# Patient Record
Sex: MALE | Race: WHITE | NOT HISPANIC OR LATINO | Employment: OTHER | URBAN - METROPOLITAN AREA
[De-identification: names, ages, dates, MRNs, and addresses within clinical notes are randomized per-mention and may not be internally consistent; named-entity substitution may affect disease eponyms.]

---

## 2023-07-07 ENCOUNTER — TELEPHONE (OUTPATIENT)
Dept: HEMATOLOGY/ONCOLOGY | Facility: CLINIC | Age: 78
End: 2023-07-07
Payer: COMMERCIAL

## 2023-07-07 ENCOUNTER — PATIENT MESSAGE (OUTPATIENT)
Dept: HEMATOLOGY/ONCOLOGY | Facility: CLINIC | Age: 78
End: 2023-07-07
Payer: MEDICARE

## 2023-07-07 NOTE — TELEPHONE ENCOUNTER
Attempted to call patient in reference to needing additional information in reference to upcoming Hematology appt.  No answer v/m full. [facility] message sent.

## 2023-07-11 ENCOUNTER — TELEPHONE (OUTPATIENT)
Dept: HEMATOLOGY/ONCOLOGY | Facility: CLINIC | Age: 78
End: 2023-07-11
Payer: MEDICARE

## 2023-07-11 NOTE — TELEPHONE ENCOUNTER
Called patient to discuss scheduled appt with Dr. Gibbs. Patient explained that he is primarily interested in discussing BCG treatment. He has discussed this with the [facility] group, and they advised him to try [facility] since we are partnered with [facility] at this time. Patient and I discussed that this particular treatment is only available in [place]. He is fine traveling there to meet with one of our urologists who can facilitate it. Scheduled with Dr. Luong for Monday, reviewed the directions to the [facility]. Patient verbalized understanding to all information, he has my number for any questions/concerns.

## 2023-07-17 ENCOUNTER — OFFICE VISIT (OUTPATIENT)
Dept: UROLOGY | Facility: CLINIC | Age: 78
End: 2023-07-17
Payer: MEDICARE

## 2023-07-17 VITALS
WEIGHT: 193.31 LBS | SYSTOLIC BLOOD PRESSURE: 120 MMHG | HEIGHT: 72 IN | DIASTOLIC BLOOD PRESSURE: 67 MMHG | HEART RATE: 79 BPM | BODY MASS INDEX: 26.18 KG/M2

## 2023-07-17 DIAGNOSIS — C67.9 MALIGNANT NEOPLASM OF URINARY BLADDER, UNSPECIFIED SITE: Primary | ICD-10-CM

## 2023-07-17 PROCEDURE — 99999 PR PBB SHADOW E&M-EST. PATIENT-LVL III: CPT | Mod: PBBFAC,,, | Performed by: STUDENT IN AN ORGANIZED HEALTH CARE EDUCATION/TRAINING PROGRAM

## 2023-07-17 PROCEDURE — 99204 OFFICE O/P NEW MOD 45 MIN: CPT | Mod: S$PBB,,, | Performed by: STUDENT IN AN ORGANIZED HEALTH CARE EDUCATION/TRAINING PROGRAM

## 2023-07-17 PROCEDURE — 99999 PR PBB SHADOW E&M-EST. PATIENT-LVL III: ICD-10-PCS | Mod: PBBFAC,,, | Performed by: STUDENT IN AN ORGANIZED HEALTH CARE EDUCATION/TRAINING PROGRAM

## 2023-07-17 PROCEDURE — 99213 OFFICE O/P EST LOW 20 MIN: CPT | Mod: PBBFAC | Performed by: STUDENT IN AN ORGANIZED HEALTH CARE EDUCATION/TRAINING PROGRAM

## 2023-07-17 PROCEDURE — 99204 PR OFFICE/OUTPT VISIT, NEW, LEVL IV, 45-59 MIN: ICD-10-PCS | Mod: S$PBB,,, | Performed by: STUDENT IN AN ORGANIZED HEALTH CARE EDUCATION/TRAINING PROGRAM

## 2023-07-17 RX ORDER — VALSARTAN 40 MG/1
40 TABLET ORAL
COMMUNITY
Start: 2023-05-02

## 2023-07-17 RX ORDER — TAMSULOSIN HYDROCHLORIDE 0.4 MG/1
0.4 CAPSULE ORAL
COMMUNITY
Start: 2022-10-27

## 2023-07-17 RX ORDER — EVOLOCUMAB 140 MG/ML
140 INJECTION, SOLUTION SUBCUTANEOUS
COMMUNITY
Start: 2023-06-15

## 2023-07-17 RX ORDER — ROSUVASTATIN CALCIUM 40 MG/1
40 TABLET, COATED ORAL NIGHTLY
COMMUNITY
Start: 2023-05-02

## 2023-07-17 RX ORDER — VIT C/E/ZN/COPPR/LUTEIN/ZEAXAN 250MG-90MG
5000 CAPSULE ORAL
COMMUNITY

## 2023-07-17 RX ORDER — EZETIMIBE 10 MG/1
10 TABLET ORAL
COMMUNITY
Start: 2023-05-02

## 2023-07-17 RX ORDER — IMIQUIMOD 12.5 MG/.25G
1 CREAM TOPICAL
COMMUNITY
Start: 2023-05-01

## 2023-07-17 RX ORDER — METRONIDAZOLE 7.5 MG/G
1 GEL TOPICAL
COMMUNITY
Start: 2022-07-25

## 2023-07-17 RX ORDER — ESZOPICLONE 3 MG/1
3 TABLET, FILM COATED ORAL
COMMUNITY
Start: 2023-05-18

## 2023-07-17 RX ORDER — METOPROLOL TARTRATE 50 MG/1
TABLET ORAL
COMMUNITY
Start: 2023-06-15 | End: 2024-06-14

## 2023-07-17 NOTE — PROGRESS NOTES
[facility]  Urologic Oncology Clinic Note        Date of Service: 7/17/2023      Chief Complaint/Reason for Consultation: NMIBC    Requesting Provider:   Alisson Self  No address on file      History of Present Illness:   Patient 77 y.o. male presents with new diagnosis of NMIBC. He was originally found to have tumor in his bladder after undergoing workup for microscopic hematuria. He had original TURBT in [place] and then went on to have repeat resection at [facility].     Urologic History:     6/9/2023 -- TURBT @ [place] -- HG Ta  6/29/2023 -- CT Urogram -- no nodes, no renal masses, no ureteral filling defects  6/30/2023 -- TURBT @ [facility] -- no tumor         Review of patient's allergies indicates:  No Known Allergies     Past Medical History:   Diagnosis Date    Kidney stone       Past Surgical History:   Procedure Laterality Date    BLADDER SURGERY      HERNIA REPAIR        History reviewed. No pertinent family history.   Social History     Tobacco Use    Smoking status: Never    Smokeless tobacco: Never   Substance Use Topics    Alcohol use: Yes        Review of Systems   Constitutional:  Negative for activity change, fatigue and fever.   HENT:  Negative for congestion.    Respiratory:  Negative for shortness of breath.    Cardiovascular:  Negative for chest pain.   Gastrointestinal:  Negative for abdominal pain.   Genitourinary:  Negative for hematuria.           OBJECTIVE:     Vitals:    07/17/23 1346   BP: 120/67   Pulse: 79   Weight: 87.7 kg (193 lb 5.5 oz)   Height: 6' (1.829 m)          General Appearance: Alert, cooperative, no distress  Head: Normocephalic  Eyes: Clear conjunctiva  Neck: No obvious LND or JVD  Lungs: Normal chest excursion, no accessory muscle use  Chest: Regular rate rhythm by palpation, no pedal edema  Abdomen: Soft, non-tender, non-distended  Extremities: Atraumatic   Lymph Nodes: No appreciable lymph adenopathy  Neurologic: No gross gait, motor or sensory  deficits            LAB:      CMP  Sodium   Date Value Ref Range Status   06/09/2023 142 136 - 145 mmol/L Final     Potassium   Date Value Ref Range Status   06/09/2023 3.7 3.5 - 5.1 mmol/L Final     Chloride   Date Value Ref Range Status   06/09/2023 106 100 - 109 mmol/L Final     Carbon Dioxide   Date Value Ref Range Status   06/09/2023 25 22 - 33 mmol/L Final     Blood Urea Nitrogen   Date Value Ref Range Status   06/09/2023 15 5 - 25 mg/dL Final     Creatinine   Date Value Ref Range Status   06/29/2023 0.96 0.67 - 1.17 mg/dL Final     Calcium   Date Value Ref Range Status   06/09/2023 9.3 8.8 - 10.6 mg/dL Final     Anion Gap   Date Value Ref Range Status   06/09/2023 11 8 - 16 mmol/L Final     Lab Results   Component Value Date    WBC 8.2 02/09/2023    HGB 15.3 02/09/2023    HCT 44.9 02/09/2023     02/09/2023             IMAGING:        Outside CT Urogram reviewed with patient      ASSESSMENT/PLAN:     78 yo M w new diagnosis of NMIBC , high grade Ta    Plan:    Today we discussed the incidence, risk factors, and natural history of bladder cancer, including in general the management options such as TURBT and radical cystectomy, and the use of intravesical therapies to avoid recurrence of cancer and progression.     Specifically, we spoke about non-muscle invasive bladder cancer and that the natural history of this cancer is to recur when low grade and progress when high grade. We also discussed that higher stages portend higher risk of recurrence and progression.     In the case of HG Ta  bladder cancer, we discussed the importance of high-quality TURBT as well as repeat TURBT to ensure all disease is resected and to ensure that staging is appropriate. He has completed this.     We informed him that he falls into the AUA intermediate risk category and should undergo some intravesical therapy to prevent recurrence and progression.     Briefly, I described that intravesical therapy is given once a week for  one hour for 6 weeks and if no bladder tumor recurrence at first surveillance then he could be a candidate for 1 or 3 years of maintenance therapy depending his risk category.    Patient understood all counseling well. He is in agreement with the plan. He  was allowed to ask questions and all were     Plan for induction gemcitabine and then follow up cystoscopy at which point we will decide on maintenance therapy.         The total time for the new patient visit was at least 45 minutes in both face-to-face and non-face-to-face activities, which included chart review, interpretation of results, counseling, education, ordering meds/tests/procedures, and/or coordination of care.       Junior Luong MD  Urologic Oncology  P: [phone]

## 2023-07-18 DIAGNOSIS — C67.8 MALIGNANT NEOPLASM OF OVERLAPPING SITES OF BLADDER: ICD-10-CM

## 2023-07-18 RX ORDER — SODIUM BICARBONATE 650 MG/1
1300 TABLET ORAL
Qty: 24 TABLET | Refills: 1 | Status: SHIPPED | OUTPATIENT
Start: 2023-08-16 | End: 2023-08-03 | Stop reason: SDUPTHER

## 2023-08-03 ENCOUNTER — PATIENT MESSAGE (OUTPATIENT)
Dept: UROLOGY | Facility: CLINIC | Age: 78
End: 2023-08-03
Payer: MEDICARE

## 2023-08-03 DIAGNOSIS — C67.8 MALIGNANT NEOPLASM OF OVERLAPPING SITES OF BLADDER: ICD-10-CM

## 2023-08-03 RX ORDER — SODIUM BICARBONATE 650 MG/1
1300 TABLET ORAL
Qty: 24 TABLET | Refills: 1 | Status: SHIPPED | OUTPATIENT
Start: 2023-08-16 | End: 2023-12-12 | Stop reason: SDUPTHER

## 2023-08-08 ENCOUNTER — OFFICE VISIT (OUTPATIENT)
Dept: UROLOGY | Facility: CLINIC | Age: 78
End: 2023-08-08
Payer: COMMERCIAL

## 2023-08-08 VITALS
WEIGHT: 193 LBS | BODY MASS INDEX: 26.14 KG/M2 | HEART RATE: 75 BPM | SYSTOLIC BLOOD PRESSURE: 151 MMHG | HEIGHT: 72 IN | DIASTOLIC BLOOD PRESSURE: 94 MMHG

## 2023-08-08 DIAGNOSIS — C67.8 MALIGNANT NEOPLASM OF OVERLAPPING SITES OF BLADDER: Primary | ICD-10-CM

## 2023-08-08 LAB
BILIRUB SERPL-MCNC: ABNORMAL MG/DL
BLOOD URINE, POC: ABNORMAL
CLARITY, POC UA: CLEAR
COLOR, POC UA: YELLOW
GLUCOSE UR QL STRIP: ABNORMAL
KETONES UR QL STRIP: ABNORMAL
LEUKOCYTE ESTERASE URINE, POC: ABNORMAL
NITRITE, POC UA: ABNORMAL
PH, POC UA: 6
PROTEIN, POC: ABNORMAL
SPECIFIC GRAVITY, POC UA: 1.01
UROBILINOGEN, POC UA: NORMAL

## 2023-08-08 PROCEDURE — 99499 UNLISTED E&M SERVICE: CPT | Mod: S$GLB,,, | Performed by: NURSE PRACTITIONER

## 2023-08-08 PROCEDURE — 1126F AMNT PAIN NOTED NONE PRSNT: CPT | Mod: CPTII,S$GLB,, | Performed by: NURSE PRACTITIONER

## 2023-08-08 PROCEDURE — 3077F PR MOST RECENT SYSTOLIC BLOOD PRESSURE >= 140 MM HG: ICD-10-PCS | Mod: CPTII,S$GLB,, | Performed by: NURSE PRACTITIONER

## 2023-08-08 PROCEDURE — 81002 POCT URINE DIPSTICK WITHOUT MICROSCOPE: ICD-10-PCS | Mod: S$GLB,,, | Performed by: NURSE PRACTITIONER

## 2023-08-08 PROCEDURE — 3288F FALL RISK ASSESSMENT DOCD: CPT | Mod: CPTII,S$GLB,, | Performed by: NURSE PRACTITIONER

## 2023-08-08 PROCEDURE — 1101F PT FALLS ASSESS-DOCD LE1/YR: CPT | Mod: CPTII,S$GLB,, | Performed by: NURSE PRACTITIONER

## 2023-08-08 PROCEDURE — 51720 PR INSTILL, ANTICANCER AGENT, BLADDER: ICD-10-PCS | Mod: S$GLB,,, | Performed by: NURSE PRACTITIONER

## 2023-08-08 PROCEDURE — 1159F MED LIST DOCD IN RCRD: CPT | Mod: CPTII,S$GLB,, | Performed by: NURSE PRACTITIONER

## 2023-08-08 PROCEDURE — 1159F PR MEDICATION LIST DOCUMENTED IN MEDICAL RECORD: ICD-10-PCS | Mod: CPTII,S$GLB,, | Performed by: NURSE PRACTITIONER

## 2023-08-08 PROCEDURE — 1126F PR PAIN SEVERITY QUANTIFIED, NO PAIN PRESENT: ICD-10-PCS | Mod: CPTII,S$GLB,, | Performed by: NURSE PRACTITIONER

## 2023-08-08 PROCEDURE — 99999 PR PBB SHADOW E&M-EST. PATIENT-LVL IV: ICD-10-PCS | Mod: PBBFAC,,, | Performed by: NURSE PRACTITIONER

## 2023-08-08 PROCEDURE — 1101F PR PT FALLS ASSESS DOC 0-1 FALLS W/OUT INJ PAST YR: ICD-10-PCS | Mod: CPTII,S$GLB,, | Performed by: NURSE PRACTITIONER

## 2023-08-08 PROCEDURE — 51720 TREATMENT OF BLADDER LESION: CPT | Mod: S$GLB,,, | Performed by: NURSE PRACTITIONER

## 2023-08-08 PROCEDURE — 3080F DIAST BP >= 90 MM HG: CPT | Mod: CPTII,S$GLB,, | Performed by: NURSE PRACTITIONER

## 2023-08-08 PROCEDURE — 99999 PR PBB SHADOW E&M-EST. PATIENT-LVL IV: CPT | Mod: PBBFAC,,, | Performed by: NURSE PRACTITIONER

## 2023-08-08 PROCEDURE — 3080F PR MOST RECENT DIASTOLIC BLOOD PRESSURE >= 90 MM HG: ICD-10-PCS | Mod: CPTII,S$GLB,, | Performed by: NURSE PRACTITIONER

## 2023-08-08 PROCEDURE — 3288F PR FALLS RISK ASSESSMENT DOCUMENTED: ICD-10-PCS | Mod: CPTII,S$GLB,, | Performed by: NURSE PRACTITIONER

## 2023-08-08 PROCEDURE — 3077F SYST BP >= 140 MM HG: CPT | Mod: CPTII,S$GLB,, | Performed by: NURSE PRACTITIONER

## 2023-08-08 PROCEDURE — 81002 URINALYSIS NONAUTO W/O SCOPE: CPT | Mod: S$GLB,,, | Performed by: NURSE PRACTITIONER

## 2023-08-08 PROCEDURE — 99499 NO LOS: ICD-10-PCS | Mod: S$GLB,,, | Performed by: NURSE PRACTITIONER

## 2023-08-08 NOTE — PROGRESS NOTES
CHIEF COMPLAINT:    Monroe Paul is a 78 y.o. male presents today for Bladder Cancer.     HISTORY OF PRESENTING ILLINESS:    Monroe Paul is a 78 y.o. male who is an established patient in our clinic.   He is newly diagnosed with NMI Bladder Cancer.   He had original TURBT in [place] and then went on to have repeat resection at [facility].   Last seen in clinic 07/17/2023 with Dr. Luong    Here today to begin Induction Gemcitabine dose 1 of 6.   Reports no issue with urination.        Urologic History:      6/9/2023 -- TURBT @ [place] -- HG Ta  6/29/2023 -- CT Urogram -- no nodes, no renal masses, no ureteral filling defects  6/30/2023 -- TURBT @ [facility] -- no tumor              REVIEW OF SYSTEMS:  Review of Systems   Constitutional: Negative.  Negative for chills and fever.   Eyes:  Negative for double vision.   Respiratory:  Negative for cough and shortness of breath.    Cardiovascular:  Negative for chest pain.   Gastrointestinal:  Negative for abdominal pain, constipation, diarrhea, nausea and vomiting.   Genitourinary: Negative.  Negative for dysuria, flank pain and hematuria.        Ok with urination  Nocturia x 1  No issues with bladder control     Neurological:  Negative for dizziness and seizures.   Endo/Heme/Allergies:  Negative for polydipsia.         PATIENT HISTORY:    Past Medical History:   Diagnosis Date    Kidney stone        Past Surgical History:   Procedure Laterality Date    BLADDER SURGERY      HERNIA REPAIR         History reviewed. No pertinent family history.    Social History     Socioeconomic History    Marital status:    Tobacco Use    Smoking status: Never    Smokeless tobacco: Never   Substance and Sexual Activity    Alcohol use: Yes    Drug use: Never    Sexual activity: Not Currently       Allergies:  Patient has no known allergies.    Medications:    Current Outpatient Medications:     cholecalciferol, vitamin D3, (VITAMIN D3) 25 mcg (1,000 unit) capsule, Take  5,000 Units by mouth., Disp: , Rfl:     denosumab (PROLIA) 60 mg/mL Syrg, Inject 60 mg into the skin., Disp: , Rfl:     eszopiclone (LUNESTA) 3 mg Tab, Take 3 mg by mouth., Disp: , Rfl:     evolocumab (REPATHA SURECLICK) 140 mg/mL PnIj, Inject 140 mg into the skin., Disp: , Rfl:     ezetimibe (ZETIA) 10 mg tablet, Take 10 mg by mouth., Disp: , Rfl:     imiquimod (ALDARA) 5 % cream, Apply 1 packet topically., Disp: , Rfl:     metoprolol tartrate (LOPRESSOR) 50 MG tablet, Take as directed the night before and 1 hour prior to CT., Disp: , Rfl:     metroNIDAZOLE (METROGEL) 0.75 % gel, Apply 1 application  topically., Disp: , Rfl:     rosuvastatin (CRESTOR) 40 MG Tab, Take 40 mg by mouth every evening., Disp: , Rfl:     [START ON 8/16/2023] sodium bicarbonate 650 MG tablet, Take 2 tablets (1,300 mg total) by mouth as needed (start before bladder installation to reduce irritation). 2 tabs night before bladder installation then 2 tabs the morning of the bladder installation, Disp: 24 tablet, Rfl: 1    tamsulosin (FLOMAX) 0.4 mg Cap, Take 0.4 mg by mouth., Disp: , Rfl:     valsartan (DIOVAN) 40 MG tablet, Take 40 mg by mouth., Disp: , Rfl:     Current Facility-Administered Medications:     gemcitabine (GEMZAR) 2,000 mg in sodium chloride 0.9% SolP 100 mL bladder instillation, 2,000 mg, Intravesical, 1 time in Clinic/HOD, Connie Steiner NP, 2,000 mg at 08/08/23 0942    PHYSICAL EXAMINATION:  Physical Exam  Vitals and nursing note reviewed.   Constitutional:       General: He is awake.      Appearance: Normal appearance.   HENT:      Head: Normocephalic.      Right Ear: External ear normal.      Left Ear: External ear normal.      Nose: Nose normal.   Cardiovascular:      Rate and Rhythm: Normal rate.   Pulmonary:      Effort: Pulmonary effort is normal. No respiratory distress.   Abdominal:      Tenderness: There is no abdominal tenderness. There is no right CVA tenderness or left CVA tenderness.   Genitourinary:      "Penis: Normal.       Testes: Normal.   Musculoskeletal:         General: Normal range of motion.      Cervical back: Normal range of motion.   Skin:     General: Skin is warm and dry.   Neurological:      General: No focal deficit present.      Mental Status: He is alert and oriented to person, place, and time.   Psychiatric:         Mood and Affect: Mood normal.         Behavior: Behavior is cooperative.           LABS:      In office UA today was clear of active infection and blood.       No results found for: "PSA", "PSADIAG", "PSATOTAL", "PHIND"    Lab Results   Component Value Date    CREATININE 0.96 06/29/2023             IMPRESSION:    Encounter Diagnoses   Name Primary?    Malignant neoplasm of overlapping sites of bladder Yes       Induction GEMCITABINE; dose 1 of 6      Assessment:       1. Malignant neoplasm of overlapping sites of bladder        Plan:         I spent 60 minutes with the patient of which more than half was spent in direct consultation with the patient in regards to our treatment and plan.  We addressed the expectations for today's plan of care.  Reviewed the preparation:   Na Bicarb 1300mg night before then this morning.   No coffee or caffeine this morning;    We discussed their Bladder Cancer.  Discussed previous treatments and the expectations, benefits, risks with this new treatment.  Reviewed how this medication works. Possible side effects.   Discussed importance of post clean up for 6 hours after the 2 hour urination;    Diet modifications; no caffeine the morning of installation; increase water intake at the 2 hour void to flush out.  Sit to urinate; flush twice; no bleach needed.  Clean urethra after each initial urination.   No sex for 48 hours after installation; use of condom during the 6 week installations.  Graves was placed and bladder drained.   I instilled the Gemcitabine 2000mg/100ml then removed catheter.   Tolerated well   He was instructed to hold this up to 120 " minutes.   I cleaned urethra and sounding tissue.  Again reviewed post installation instructions.  Recommended lifestyle modifications with proper, healthy diet, good hydration if no fluid restrictions; reducing bladder irritants.   RTC one week for dose 2 of 6.

## 2023-08-15 ENCOUNTER — OFFICE VISIT (OUTPATIENT)
Dept: UROLOGY | Facility: CLINIC | Age: 78
End: 2023-08-15
Payer: COMMERCIAL

## 2023-08-15 VITALS
HEIGHT: 72 IN | HEART RATE: 69 BPM | BODY MASS INDEX: 26.14 KG/M2 | WEIGHT: 193 LBS | DIASTOLIC BLOOD PRESSURE: 88 MMHG | SYSTOLIC BLOOD PRESSURE: 147 MMHG

## 2023-08-15 DIAGNOSIS — C67.8 MALIGNANT NEOPLASM OF OVERLAPPING SITES OF BLADDER: Primary | ICD-10-CM

## 2023-08-15 DIAGNOSIS — C67.9 MALIGNANT NEOPLASM OF URINARY BLADDER, UNSPECIFIED SITE: ICD-10-CM

## 2023-08-15 LAB
BILIRUB SERPL-MCNC: ABNORMAL MG/DL
BLOOD URINE, POC: ABNORMAL
CLARITY, POC UA: CLEAR
COLOR, POC UA: YELLOW
GLUCOSE UR QL STRIP: ABNORMAL
KETONES UR QL STRIP: ABNORMAL
LEUKOCYTE ESTERASE URINE, POC: ABNORMAL
NITRITE, POC UA: ABNORMAL
PH, POC UA: 5
PROTEIN, POC: ABNORMAL
SPECIFIC GRAVITY, POC UA: 1.02
UROBILINOGEN, POC UA: NORMAL

## 2023-08-15 PROCEDURE — 3288F FALL RISK ASSESSMENT DOCD: CPT | Mod: CPTII,S$GLB,, | Performed by: NURSE PRACTITIONER

## 2023-08-15 PROCEDURE — 1101F PT FALLS ASSESS-DOCD LE1/YR: CPT | Mod: CPTII,S$GLB,, | Performed by: NURSE PRACTITIONER

## 2023-08-15 PROCEDURE — 1101F PR PT FALLS ASSESS DOC 0-1 FALLS W/OUT INJ PAST YR: ICD-10-PCS | Mod: CPTII,S$GLB,, | Performed by: NURSE PRACTITIONER

## 2023-08-15 PROCEDURE — 51720 TREATMENT OF BLADDER LESION: CPT | Mod: S$GLB,,, | Performed by: NURSE PRACTITIONER

## 2023-08-15 PROCEDURE — 99999 PR PBB SHADOW E&M-EST. PATIENT-LVL IV: ICD-10-PCS | Mod: PBBFAC,,, | Performed by: NURSE PRACTITIONER

## 2023-08-15 PROCEDURE — 1159F MED LIST DOCD IN RCRD: CPT | Mod: CPTII,S$GLB,, | Performed by: NURSE PRACTITIONER

## 2023-08-15 PROCEDURE — 3079F PR MOST RECENT DIASTOLIC BLOOD PRESSURE 80-89 MM HG: ICD-10-PCS | Mod: CPTII,S$GLB,, | Performed by: NURSE PRACTITIONER

## 2023-08-15 PROCEDURE — 81002 POCT URINE DIPSTICK WITHOUT MICROSCOPE: ICD-10-PCS | Mod: S$GLB,,, | Performed by: NURSE PRACTITIONER

## 2023-08-15 PROCEDURE — 99499 NO LOS: ICD-10-PCS | Mod: S$GLB,,, | Performed by: NURSE PRACTITIONER

## 2023-08-15 PROCEDURE — 1126F AMNT PAIN NOTED NONE PRSNT: CPT | Mod: CPTII,S$GLB,, | Performed by: NURSE PRACTITIONER

## 2023-08-15 PROCEDURE — 3079F DIAST BP 80-89 MM HG: CPT | Mod: CPTII,S$GLB,, | Performed by: NURSE PRACTITIONER

## 2023-08-15 PROCEDURE — 3077F SYST BP >= 140 MM HG: CPT | Mod: CPTII,S$GLB,, | Performed by: NURSE PRACTITIONER

## 2023-08-15 PROCEDURE — 3077F PR MOST RECENT SYSTOLIC BLOOD PRESSURE >= 140 MM HG: ICD-10-PCS | Mod: CPTII,S$GLB,, | Performed by: NURSE PRACTITIONER

## 2023-08-15 PROCEDURE — 81002 URINALYSIS NONAUTO W/O SCOPE: CPT | Mod: S$GLB,,, | Performed by: NURSE PRACTITIONER

## 2023-08-15 PROCEDURE — 3288F PR FALLS RISK ASSESSMENT DOCUMENTED: ICD-10-PCS | Mod: CPTII,S$GLB,, | Performed by: NURSE PRACTITIONER

## 2023-08-15 PROCEDURE — 51720 PR INSTILL, ANTICANCER AGENT, BLADDER: ICD-10-PCS | Mod: S$GLB,,, | Performed by: NURSE PRACTITIONER

## 2023-08-15 PROCEDURE — 1159F PR MEDICATION LIST DOCUMENTED IN MEDICAL RECORD: ICD-10-PCS | Mod: CPTII,S$GLB,, | Performed by: NURSE PRACTITIONER

## 2023-08-15 PROCEDURE — 99499 UNLISTED E&M SERVICE: CPT | Mod: S$GLB,,, | Performed by: NURSE PRACTITIONER

## 2023-08-15 PROCEDURE — 1126F PR PAIN SEVERITY QUANTIFIED, NO PAIN PRESENT: ICD-10-PCS | Mod: CPTII,S$GLB,, | Performed by: NURSE PRACTITIONER

## 2023-08-15 PROCEDURE — 99999 PR PBB SHADOW E&M-EST. PATIENT-LVL IV: CPT | Mod: PBBFAC,,, | Performed by: NURSE PRACTITIONER

## 2023-08-15 RX ORDER — POTASSIUM CHLORIDE 750 MG/1
10 TABLET, EXTENDED RELEASE ORAL
COMMUNITY
Start: 2022-10-06

## 2023-08-15 RX ORDER — LINACLOTIDE 290 UG/1
290 CAPSULE, GELATIN COATED ORAL
COMMUNITY
Start: 2023-07-11

## 2023-08-15 RX ORDER — HYOSCYAMINE SULFATE 0.12 MG/1
TABLET, ORALLY DISINTEGRATING ORAL
COMMUNITY
Start: 2023-06-29

## 2023-08-15 RX ORDER — SULFAMETHOXAZOLE AND TRIMETHOPRIM 800; 160 MG/1; MG/1
1 TABLET ORAL 2 TIMES DAILY
COMMUNITY
Start: 2023-06-29

## 2023-08-15 RX ORDER — HYOSCYAMINE SULFATE 0.12 MG/1
0.12 TABLET, ORALLY DISINTEGRATING ORAL
COMMUNITY
Start: 2023-06-29

## 2023-08-15 RX ORDER — HYDROCHLOROTHIAZIDE 50 MG/1
50 TABLET ORAL
COMMUNITY
Start: 2022-08-15

## 2023-08-15 RX ORDER — LINACLOTIDE 72 UG/1
72 CAPSULE, GELATIN COATED ORAL EVERY MORNING
COMMUNITY
Start: 2023-03-13

## 2023-08-15 NOTE — PROGRESS NOTES
CHIEF COMPLAINT:    Monroe Paul is a 78 y.o. male presents today for Bladder Cancer.     HISTORY OF PRESENTING ILLINESS:    Monroe Paul is a 78 y.o. male who is an established patient in our clinic.   He is newly diagnosed with NMI Bladder Cancer.   He had original TURBT in [place] and then went on to have repeat resection at [facility].   Last seen in clinic 07/17/2023 with Dr. Luong     08/08/2023 received dose 1 of 6;  30 minutes after installation urinated some; then able to hold it.  No dysuria/hematuria.    Here today to begin Induction Gemcitabine dose 2 of 6.   Reports no issue with urination.         Urologic History:      6/9/2023 -- TURBT @ [place] -- HG Ta  6/29/2023 -- CT Urogram -- no nodes, no renal masses, no ureteral filling defects  6/30/2023 -- TURBT @ [facility] -- no tumor              REVIEW OF SYSTEMS:  Review of Systems   Constitutional: Negative.  Negative for chills and fever.   Eyes:  Negative for double vision.   Respiratory:  Negative for cough and shortness of breath.    Cardiovascular:  Negative for chest pain.   Gastrointestinal:  Negative for abdominal pain, constipation, diarrhea, nausea and vomiting.   Genitourinary: Negative.  Negative for dysuria, flank pain and hematuria.        Nocturia x 1     Neurological:  Negative for dizziness and seizures.   Endo/Heme/Allergies:  Negative for polydipsia.         PATIENT HISTORY:    Past Medical History:   Diagnosis Date    Kidney stone        Past Surgical History:   Procedure Laterality Date    BLADDER SURGERY      HERNIA REPAIR         History reviewed. No pertinent family history.    Social History     Socioeconomic History    Marital status:    Tobacco Use    Smoking status: Never    Smokeless tobacco: Never   Substance and Sexual Activity    Alcohol use: Yes    Drug use: Never    Sexual activity: Not Currently       Allergies:  Patient has no known allergies.    Medications:    Current Outpatient Medications:      hydroCHLOROthiazide (HYDRODIURIL) 50 MG tablet, Take 50 mg by mouth., Disp: , Rfl:     hyoscyamine (LEVSIN) 0.125 mg TbDL, Take 0.125 mg by mouth., Disp: , Rfl:     linaCLOtide (LINZESS) 290 mcg Cap capsule, Take 290 mcg by mouth., Disp: , Rfl:     potassium chloride (KLOR-CON) 10 MEQ TbSR, Take 10 mEq by mouth., Disp: , Rfl:     cholecalciferol, vitamin D3, (VITAMIN D3) 25 mcg (1,000 unit) capsule, Take 5,000 Units by mouth., Disp: , Rfl:     denosumab (PROLIA) 60 mg/mL Syrg, Inject 60 mg into the skin., Disp: , Rfl:     eszopiclone (LUNESTA) 3 mg Tab, Take 3 mg by mouth., Disp: , Rfl:     evolocumab (REPATHA SURECLICK) 140 mg/mL PnIj, Inject 140 mg into the skin., Disp: , Rfl:     ezetimibe (ZETIA) 10 mg tablet, Take 10 mg by mouth., Disp: , Rfl:     hyoscyamine (LEVSIN) 0.125 mg TbDL, Take by mouth., Disp: , Rfl:     imiquimod (ALDARA) 5 % cream, Apply 1 packet topically., Disp: , Rfl:     LINZESS 290 mcg Cap capsule, Take 290 mcg by mouth., Disp: , Rfl:     LINZESS 72 mcg Cap capsule, Take 72 mcg by mouth every morning., Disp: , Rfl:     metoprolol tartrate (LOPRESSOR) 50 MG tablet, Take as directed the night before and 1 hour prior to CT., Disp: , Rfl:     metroNIDAZOLE (METROGEL) 0.75 % gel, Apply 1 application  topically., Disp: , Rfl:     rosuvastatin (CRESTOR) 40 MG Tab, Take 40 mg by mouth every evening., Disp: , Rfl:     [START ON 8/16/2023] sodium bicarbonate 650 MG tablet, Take 2 tablets (1,300 mg total) by mouth as needed (start before bladder installation to reduce irritation). 2 tabs night before bladder installation then 2 tabs the morning of the bladder installation, Disp: 24 tablet, Rfl: 1    sulfamethoxazole-trimethoprim 800-160mg (BACTRIM DS) 800-160 mg Tab, Take 1 tablet by mouth 2 (two) times daily., Disp: , Rfl:     tamsulosin (FLOMAX) 0.4 mg Cap, Take 0.4 mg by mouth., Disp: , Rfl:     TURMERIC ORAL, Take 1 capsule by mouth once daily., Disp: , Rfl:     valsartan (DIOVAN) 40 MG tablet, Take  "40 mg by mouth., Disp: , Rfl:     ZINC ORAL, Take 1 tablet by mouth once daily., Disp: , Rfl:     Current Facility-Administered Medications:     gemcitabine (GEMZAR) 2,000 mg in sodium chloride 0.9% SolP 100 mL bladder instillation, 2,000 mg, Intravesical, 1 time in Clinic/HOD, Connie Steiner, [facility]    PHYSICAL EXAMINATION:  Physical Exam  Vitals and nursing note reviewed.   Constitutional:       General: He is awake.      Appearance: Normal appearance.   HENT:      Head: Normocephalic.      Right Ear: External ear normal.      Left Ear: External ear normal.      Nose: Nose normal.   Cardiovascular:      Rate and Rhythm: Normal rate.   Pulmonary:      Effort: Pulmonary effort is normal. No respiratory distress.   Abdominal:      Tenderness: There is no abdominal tenderness. There is no right CVA tenderness or left CVA tenderness.   Genitourinary:     Penis: Normal and circumcised. No hypospadias or erythema.       Testes: Normal.   Musculoskeletal:         General: Normal range of motion.      Cervical back: Normal range of motion.   Skin:     General: Skin is warm and dry.   Neurological:      General: No focal deficit present.      Mental Status: He is alert and oriented to person, place, and time.   Psychiatric:         Mood and Affect: Mood normal.         Behavior: Behavior is cooperative.           LABS:      In office UA today was clear of active infection and blood.       No results found for: "PSA", "PSADIAG", "PSATOTAL", "PHIND"    Lab Results   Component Value Date    CREATININE 0.96 06/29/2023             IMPRESSION:    Encounter Diagnoses   Name Primary?    Malignant neoplasm of overlapping sites of bladder Yes    Malignant neoplasm of urinary bladder, unspecified site        Induction GEMCITABINE; dose 2 of 6      Assessment:       1. Malignant neoplasm of overlapping sites of bladder    2. Malignant neoplasm of urinary bladder, unspecified site        Plan:          I spent 45 minutes with the patient " of which more than half was spent in direct consultation with the patient in regards to our treatment and plan.  We addressed the expectations for today's plan of care.  Reviewed the preparation:   Na Bicarb 1300mg night before then this morning.   No coffee or caffeine this morning;    We discussed their Bladder Cancer.  Discussed previous treatments and the expectations, benefits, risks with this new treatment.  Reviewed how this medication works. Possible side effects.   Discussed importance of post clean up for 6 hours after the 2 hour urination;    Diet modifications; no caffeine the morning of installation; increase water intake at the 2 hour void to flush out.  Sit to urinate; flush twice; no bleach needed.  Clean urethra after each initial urination.   No sex for 48 hours after installation; use of condom during the 6 week installations.  Graves was placed and bladder drained.   I instilled the Gemcitabine 2000mg/100ml then removed catheter.   Tolerated well   He was instructed to hold this up to 120 minutes.   I cleaned urethra and sounding tissue.  Again reviewed post installation instructions.  Recommended lifestyle modifications with proper, healthy diet, good hydration if no fluid restrictions; reducing bladder irritants.   RTC one week for dose 3 of 6.

## 2023-08-22 ENCOUNTER — OFFICE VISIT (OUTPATIENT)
Dept: UROLOGY | Facility: CLINIC | Age: 78
End: 2023-08-22
Payer: MEDICARE

## 2023-08-22 DIAGNOSIS — C67.8 MALIGNANT NEOPLASM OF OVERLAPPING SITES OF BLADDER: Primary | ICD-10-CM

## 2023-08-22 LAB
BILIRUB SERPL-MCNC: NORMAL MG/DL
BLOOD URINE, POC: NORMAL
CLARITY, POC UA: CLEAR
COLOR, POC UA: YELLOW
GLUCOSE UR QL STRIP: NORMAL
KETONES UR QL STRIP: NORMAL
LEUKOCYTE ESTERASE URINE, POC: NORMAL
NITRITE, POC UA: NORMAL
PH, POC UA: 5
PROTEIN, POC: NORMAL
SPECIFIC GRAVITY, POC UA: 1.01
UROBILINOGEN, POC UA: NORMAL

## 2023-08-22 PROCEDURE — 51720 TREATMENT OF BLADDER LESION: CPT | Mod: S$PBB,,, | Performed by: NURSE PRACTITIONER

## 2023-08-22 PROCEDURE — 99213 OFFICE O/P EST LOW 20 MIN: CPT | Mod: PBBFAC | Performed by: NURSE PRACTITIONER

## 2023-08-22 PROCEDURE — 99215 OFFICE O/P EST HI 40 MIN: CPT | Mod: S$PBB,25,, | Performed by: NURSE PRACTITIONER

## 2023-08-22 PROCEDURE — 99999 PR PBB SHADOW E&M-EST. PATIENT-LVL III: CPT | Mod: PBBFAC,,, | Performed by: NURSE PRACTITIONER

## 2023-08-22 PROCEDURE — 96372 THER/PROPH/DIAG INJ SC/IM: CPT | Mod: PBBFAC | Performed by: NURSE PRACTITIONER

## 2023-08-22 PROCEDURE — 99999 PR PBB SHADOW E&M-EST. PATIENT-LVL III: ICD-10-PCS | Mod: PBBFAC,,, | Performed by: NURSE PRACTITIONER

## 2023-08-22 PROCEDURE — 81002 URINALYSIS NONAUTO W/O SCOPE: CPT | Mod: PBBFAC | Performed by: NURSE PRACTITIONER

## 2023-08-22 PROCEDURE — 99999PBSHW PR PBB SHADOW TECHNICAL ONLY FILED TO HB: Mod: PBBFAC,,,

## 2023-08-22 PROCEDURE — 51720 PR INSTILL, ANTICANCER AGENT, BLADDER: ICD-10-PCS | Mod: S$PBB,,, | Performed by: NURSE PRACTITIONER

## 2023-08-22 PROCEDURE — 51720 TREATMENT OF BLADDER LESION: CPT | Mod: PBBFAC | Performed by: NURSE PRACTITIONER

## 2023-08-22 PROCEDURE — 99999PBSHW POCT URINE DIPSTICK WITHOUT MICROSCOPE: Mod: PBBFAC,,,

## 2023-08-22 PROCEDURE — 99215 PR OFFICE/OUTPT VISIT, EST, LEVL V, 40-54 MIN: ICD-10-PCS | Mod: S$PBB,25,, | Performed by: NURSE PRACTITIONER

## 2023-08-22 PROCEDURE — 99999PBSHW PR PBB SHADOW TECHNICAL ONLY FILED TO HB: ICD-10-PCS | Mod: PBBFAC,,,

## 2023-08-22 RX ADMIN — GEMCITABINE HYDROCHLORIDE 2000 MG: 1 INJECTION, SOLUTION INTRAVENOUS at 09:08

## 2023-08-22 NOTE — PROGRESS NOTES
CHIEF COMPLAINT:    Monroe Paul is a 78 y.o. male presents today for Bladder Cancer.     HISTORY OF PRESENTING ILLINESS:    Monroe Paul is a 78 y.o. male who is an established patient in our clinic.   He is newly diagnosed with NMI Bladder Cancer.   He had original TURBT in [place] and then went on to have repeat resection at [facility].   Last seen in clinic 07/17/2023 with Dr. Luong     08/08/2023 he began Induction GEMCITABINE; dose 1 of 6;  30 minutes after installation urinated some; then able to hold it.  No dysuria/hematuria.     Here today to begin Induction Gemcitabine dose 3 of 6.   Reports no issue with urination. tolerating well.         Urologic History:      6/9/2023 -- TURBT @ [place] -- HG Ta  6/29/2023 -- CT Urogram -- no nodes, no renal masses, no ureteral filling defects  6/30/2023 -- TURBT @ [facility] -- no tumor              REVIEW OF SYSTEMS:  Review of Systems   Constitutional: Negative.  Negative for chills and fever.   Eyes:  Negative for double vision.   Respiratory:  Negative for cough and shortness of breath.    Cardiovascular:  Negative for chest pain.   Gastrointestinal:  Negative for abdominal pain, constipation, diarrhea, nausea and vomiting.   Genitourinary: Negative.  Negative for dysuria, flank pain and hematuria.        Ok with urination     Neurological:  Negative for dizziness and seizures.   Endo/Heme/Allergies:  Negative for polydipsia.         PATIENT HISTORY:    Past Medical History:   Diagnosis Date    Kidney stone        Past Surgical History:   Procedure Laterality Date    BLADDER SURGERY      HERNIA REPAIR         History reviewed. No pertinent family history.    Social History     Socioeconomic History    Marital status:    Tobacco Use    Smoking status: Never    Smokeless tobacco: Never   Substance and Sexual Activity    Alcohol use: Yes    Drug use: Never    Sexual activity: Not Currently       Allergies:  Patient has no known  allergies.    Medications:    Current Outpatient Medications:     cholecalciferol, vitamin D3, (VITAMIN D3) 25 mcg (1,000 unit) capsule, Take 5,000 Units by mouth., Disp: , Rfl:     denosumab (PROLIA) 60 mg/mL Syrg, Inject 60 mg into the skin., Disp: , Rfl:     eszopiclone (LUNESTA) 3 mg Tab, Take 3 mg by mouth., Disp: , Rfl:     evolocumab (REPATHA SURECLICK) 140 mg/mL PnIj, Inject 140 mg into the skin., Disp: , Rfl:     ezetimibe (ZETIA) 10 mg tablet, Take 10 mg by mouth., Disp: , Rfl:     hydroCHLOROthiazide (HYDRODIURIL) 50 MG tablet, Take 50 mg by mouth., Disp: , Rfl:     hyoscyamine (LEVSIN) 0.125 mg TbDL, Take by mouth., Disp: , Rfl:     hyoscyamine (LEVSIN) 0.125 mg TbDL, Take 0.125 mg by mouth., Disp: , Rfl:     imiquimod (ALDARA) 5 % cream, Apply 1 packet topically., Disp: , Rfl:     linaCLOtide (LINZESS) 290 mcg Cap capsule, Take 290 mcg by mouth., Disp: , Rfl:     LINZESS 290 mcg Cap capsule, Take 290 mcg by mouth., Disp: , Rfl:     LINZESS 72 mcg Cap capsule, Take 72 mcg by mouth every morning., Disp: , Rfl:     metoprolol tartrate (LOPRESSOR) 50 MG tablet, Take as directed the night before and 1 hour prior to CT., Disp: , Rfl:     metroNIDAZOLE (METROGEL) 0.75 % gel, Apply 1 application  topically., Disp: , Rfl:     potassium chloride (KLOR-CON) 10 MEQ TbSR, Take 10 mEq by mouth., Disp: , Rfl:     rosuvastatin (CRESTOR) 40 MG Tab, Take 40 mg by mouth every evening., Disp: , Rfl:     sodium bicarbonate 650 MG tablet, Take 2 tablets (1,300 mg total) by mouth as needed (start before bladder installation to reduce irritation). 2 tabs night before bladder installation then 2 tabs the morning of the bladder installation, Disp: 24 tablet, Rfl: 1    sulfamethoxazole-trimethoprim 800-160mg (BACTRIM DS) 800-160 mg Tab, Take 1 tablet by mouth 2 (two) times daily., Disp: , Rfl:     tamsulosin (FLOMAX) 0.4 mg Cap, Take 0.4 mg by mouth., Disp: , Rfl:     TURMERIC ORAL, Take 1 capsule by mouth once daily., Disp: ,  "Rfl:     valsartan (DIOVAN) 40 MG tablet, Take 40 mg by mouth., Disp: , Rfl:     ZINC ORAL, Take 1 tablet by mouth once daily., Disp: , Rfl:     Current Facility-Administered Medications:     gemcitabine (GEMZAR) 2,000 mg in sodium chloride 0.9% SolP 100 mL bladder instillation, 2,000 mg, Intravesical, 1 time in Clinic/HOD, Connie Steiner NP    PHYSICAL EXAMINATION:  Physical Exam  Vitals and nursing note reviewed.   Constitutional:       General: He is awake.      Appearance: Normal appearance.   HENT:      Head: Normocephalic.      Right Ear: External ear normal.      Left Ear: External ear normal.      Nose: Nose normal.   Cardiovascular:      Rate and Rhythm: Normal rate.   Pulmonary:      Effort: Pulmonary effort is normal. No respiratory distress.   Abdominal:      Tenderness: There is no abdominal tenderness. There is no right CVA tenderness or left CVA tenderness.   Genitourinary:     Penis: Normal and circumcised. No hypospadias, erythema or tenderness.       Testes: Normal.   Musculoskeletal:         General: Normal range of motion.      Cervical back: Normal range of motion.   Skin:     General: Skin is warm and dry.   Neurological:      General: No focal deficit present.      Mental Status: He is alert and oriented to person, place, and time.   Psychiatric:         Mood and Affect: Mood normal.         Behavior: Behavior is cooperative.           LABS:      In office UA today was clear of active infection and GH.       No results found for: "PSA", "PSADIAG", "PSATOTAL", "PHIND"    Lab Results   Component Value Date    CREATININE 0.96 06/29/2023             IMPRESSION:    Encounter Diagnoses   Name Primary?    Malignant neoplasm of overlapping sites of bladder Yes       Induction GEMCITABINE; dose 3 of 6      Assessment:       1. Malignant neoplasm of overlapping sites of bladder        Plan:          I spent 40 minutes with the patient of which more than half was spent in direct consultation with the " patient in regards to our treatment and plan.  We addressed the expectations for today's plan of care.  Reviewed the preparation:   Na Bicarb 1300mg night before then this morning.   No coffee or caffeine this morning;    We discussed their Bladder Cancer.  Discussed previous treatments and the expectations, benefits, risks with this new treatment.  Reviewed how this medication works. Possible side effects.   Discussed importance of post clean up for 6 hours after the 2 hour urination;    Diet modifications; no caffeine the morning of installation; increase water intake at the 2 hour void to flush out.  Sit to urinate; flush twice; no bleach needed.  Clean urethra after each initial urination.   No sex for 48 hours after installation; use of condom during the 6 week installations.  Graves was placed and bladder drained.   I instilled the Gemcitabine 2000mg/100ml then removed catheter.   Tolerated well   He was instructed to hold this up to 120 minutes.   I cleaned urethra and sounding tissue.  Again reviewed post installation instructions.  Recommended lifestyle modifications with proper, healthy diet, good hydration if no fluid restrictions; reducing bladder irritants.   RTC one week for dose 4 of 6.

## 2023-08-29 ENCOUNTER — OFFICE VISIT (OUTPATIENT)
Dept: UROLOGY | Facility: CLINIC | Age: 78
End: 2023-08-29
Payer: MEDICARE

## 2023-08-29 VITALS
DIASTOLIC BLOOD PRESSURE: 88 MMHG | HEIGHT: 72 IN | WEIGHT: 193 LBS | SYSTOLIC BLOOD PRESSURE: 145 MMHG | BODY MASS INDEX: 26.14 KG/M2 | HEART RATE: 81 BPM

## 2023-08-29 DIAGNOSIS — C67.8 MALIGNANT NEOPLASM OF OVERLAPPING SITES OF BLADDER: Primary | ICD-10-CM

## 2023-08-29 PROCEDURE — 99214 OFFICE O/P EST MOD 30 MIN: CPT | Mod: PBBFAC | Performed by: NURSE PRACTITIONER

## 2023-08-29 PROCEDURE — 99999PBSHW PR PBB SHADOW TECHNICAL ONLY FILED TO HB: Mod: PBBFAC,,,

## 2023-08-29 PROCEDURE — 99999 PR PBB SHADOW E&M-EST. PATIENT-LVL IV: CPT | Mod: PBBFAC,,, | Performed by: NURSE PRACTITIONER

## 2023-08-29 PROCEDURE — 99215 OFFICE O/P EST HI 40 MIN: CPT | Mod: S$PBB,,, | Performed by: NURSE PRACTITIONER

## 2023-08-29 PROCEDURE — 96372 THER/PROPH/DIAG INJ SC/IM: CPT | Mod: PBBFAC | Performed by: NURSE PRACTITIONER

## 2023-08-29 PROCEDURE — 99999 PR PBB SHADOW E&M-EST. PATIENT-LVL IV: ICD-10-PCS | Mod: PBBFAC,,, | Performed by: NURSE PRACTITIONER

## 2023-08-29 PROCEDURE — 99999PBSHW PR PBB SHADOW TECHNICAL ONLY FILED TO HB: ICD-10-PCS | Mod: PBBFAC,,,

## 2023-08-29 PROCEDURE — 99215 PR OFFICE/OUTPT VISIT, EST, LEVL V, 40-54 MIN: ICD-10-PCS | Mod: S$PBB,,, | Performed by: NURSE PRACTITIONER

## 2023-08-29 RX ADMIN — GEMCITABINE HYDROCHLORIDE 2000 MG: 1 INJECTION, SOLUTION INTRAVENOUS at 09:08

## 2023-08-29 NOTE — PROGRESS NOTES
CHIEF COMPLAINT:    Monroe Paul is a 78 y.o. male presents today for Bladder Cancer.     HISTORY OF PRESENTING ILLINESS:    Monroe Paul is a 78 y.o. male who is an established patient in our clinic.   He is newly diagnosed with NMI Bladder Cancer.   He had original TURBT in [place] and then went on to have repeat resection at [facility].   Last seen in clinic 07/17/2023 with Dr. Luong     08/08/2023 he began Induction GEMCITABINE x 6 dosed.      Here today to begin Induction Gemcitabine dose 4 of 6.   Reports no issue with urination. tolerating well.   No dysuria/hematuria.     Urologic History:      6/9/2023 -- TURBT @ [place] -- HG Ta  6/29/2023 -- CT Urogram -- no nodes, no renal masses, no ureteral filling defects  6/30/2023 -- TURBT @ [facility] -- no tumor        REVIEW OF SYSTEMS:  Review of Systems   Constitutional: Negative.  Negative for chills and fever.   Eyes:  Negative for double vision.   Respiratory:  Negative for cough and shortness of breath.    Cardiovascular:  Negative for chest pain.   Gastrointestinal:  Negative for abdominal pain, constipation, diarrhea, nausea and vomiting.   Genitourinary: Negative.  Negative for dysuria, flank pain and hematuria.        Ok with urination     Neurological:  Negative for dizziness and seizures.   Endo/Heme/Allergies:  Negative for polydipsia.         PATIENT HISTORY:    Past Medical History:   Diagnosis Date    Kidney stone        Past Surgical History:   Procedure Laterality Date    BLADDER SURGERY      HERNIA REPAIR         History reviewed. No pertinent family history.    Social History     Socioeconomic History    Marital status:    Tobacco Use    Smoking status: Never    Smokeless tobacco: Never   Substance and Sexual Activity    Alcohol use: Yes    Drug use: Never    Sexual activity: Not Currently       Allergies:  Patient has no known allergies.    Medications:    Current Outpatient Medications:     cholecalciferol, vitamin D3,  (VITAMIN D3) 25 mcg (1,000 unit) capsule, Take 5,000 Units by mouth., Disp: , Rfl:     denosumab (PROLIA) 60 mg/mL Syrg, Inject 60 mg into the skin., Disp: , Rfl:     eszopiclone (LUNESTA) 3 mg Tab, Take 3 mg by mouth., Disp: , Rfl:     evolocumab (REPATHA SURECLICK) 140 mg/mL PnIj, Inject 140 mg into the skin., Disp: , Rfl:     ezetimibe (ZETIA) 10 mg tablet, Take 10 mg by mouth., Disp: , Rfl:     hydroCHLOROthiazide (HYDRODIURIL) 50 MG tablet, Take 50 mg by mouth., Disp: , Rfl:     hyoscyamine (LEVSIN) 0.125 mg TbDL, Take by mouth., Disp: , Rfl:     hyoscyamine (LEVSIN) 0.125 mg TbDL, Take 0.125 mg by mouth., Disp: , Rfl:     imiquimod (ALDARA) 5 % cream, Apply 1 packet topically., Disp: , Rfl:     linaCLOtide (LINZESS) 290 mcg Cap capsule, Take 290 mcg by mouth., Disp: , Rfl:     LINZESS 290 mcg Cap capsule, Take 290 mcg by mouth., Disp: , Rfl:     LINZESS 72 mcg Cap capsule, Take 72 mcg by mouth every morning., Disp: , Rfl:     metoprolol tartrate (LOPRESSOR) 50 MG tablet, Take as directed the night before and 1 hour prior to CT., Disp: , Rfl:     metroNIDAZOLE (METROGEL) 0.75 % gel, Apply 1 application  topically., Disp: , Rfl:     potassium chloride (KLOR-CON) 10 MEQ TbSR, Take 10 mEq by mouth., Disp: , Rfl:     rosuvastatin (CRESTOR) 40 MG Tab, Take 40 mg by mouth every evening., Disp: , Rfl:     sodium bicarbonate 650 MG tablet, Take 2 tablets (1,300 mg total) by mouth as needed (start before bladder installation to reduce irritation). 2 tabs night before bladder installation then 2 tabs the morning of the bladder installation, Disp: 24 tablet, Rfl: 1    sulfamethoxazole-trimethoprim 800-160mg (BACTRIM DS) 800-160 mg Tab, Take 1 tablet by mouth 2 (two) times daily., Disp: , Rfl:     tamsulosin (FLOMAX) 0.4 mg Cap, Take 0.4 mg by mouth., Disp: , Rfl:     TURMERIC ORAL, Take 1 capsule by mouth once daily., Disp: , Rfl:     valsartan (DIOVAN) 40 MG tablet, Take 40 mg by mouth., Disp: , Rfl:     ZINC ORAL, Take  "1 tablet by mouth once daily., Disp: , Rfl:     Current Facility-Administered Medications:     gemcitabine (GEMZAR) 2,000 mg in sodium chloride 0.9% SolP 100 mL bladder instillation, 2,000 mg, Intravesical, 1 time in Clinic/HOD, Connie Steiner, [facility]    PHYSICAL EXAMINATION:  Physical Exam  Vitals and nursing note reviewed.   Constitutional:       General: He is awake.      Appearance: Normal appearance.   HENT:      Head: Normocephalic.      Right Ear: External ear normal.      Left Ear: External ear normal.      Nose: Nose normal.   Cardiovascular:      Rate and Rhythm: Normal rate.   Pulmonary:      Effort: Pulmonary effort is normal. No respiratory distress.   Abdominal:      Tenderness: There is no abdominal tenderness. There is no right CVA tenderness or left CVA tenderness.   Musculoskeletal:         General: Normal range of motion.      Cervical back: Normal range of motion.   Skin:     General: Skin is warm and dry.   Neurological:      General: No focal deficit present.      Mental Status: He is alert and oriented to person, place, and time.   Psychiatric:         Mood and Affect: Mood normal.         Behavior: Behavior is cooperative.           LABS:      In office UA today was clear of active infection and GH.       No results found for: "PSA", "PSADIAG", "PSATOTAL", "PHIND"    Lab Results   Component Value Date    CREATININE 0.96 06/29/2023             IMPRESSION:    Encounter Diagnoses   Name Primary?    Malignant neoplasm of overlapping sites of bladder Yes       Induction GEMCITABINE; dose 4 of 6      Assessment:       1. Malignant neoplasm of overlapping sites of bladder        Plan:          I spent 40 minutes with the patient of which more than half was spent in direct consultation with the patient in regards to our treatment and plan.  We addressed the expectations for today's plan of care.  Reviewed the preparation:   Na Bicarb 1300mg night before then this morning.   No coffee or caffeine this " morning;    We discussed their Bladder Cancer.  Discussed previous treatments and the expectations, benefits, risks with this new treatment.  Reviewed how this medication works. Possible side effects.   Discussed importance of post clean up for 6 hours after the 2 hour urination;    Diet modifications; no caffeine the morning of installation; increase water intake at the 2 hour void to flush out.  Sit to urinate; flush twice; no bleach needed.  Clean urethra after each initial urination.   No sex for 48 hours after installation; use of condom during the 6 week installations.  Graves was placed and bladder drained.   I instilled the Gemcitabine 2000mg/100ml then removed catheter.   Tolerated well   He was instructed to hold this up to 120 minutes.   I cleaned urethra and sounding tissue.  Again reviewed post installation instructions.  Recommended lifestyle modifications with proper, healthy diet, good hydration if no fluid restrictions; reducing bladder irritants.   RTC one week for dose 5 of 6.

## 2023-09-06 ENCOUNTER — OFFICE VISIT (OUTPATIENT)
Dept: UROLOGY | Facility: CLINIC | Age: 78
End: 2023-09-06
Payer: MEDICARE

## 2023-09-06 VITALS
SYSTOLIC BLOOD PRESSURE: 107 MMHG | DIASTOLIC BLOOD PRESSURE: 77 MMHG | HEART RATE: 47 BPM | BODY MASS INDEX: 26.55 KG/M2 | HEIGHT: 72 IN | WEIGHT: 196 LBS

## 2023-09-06 DIAGNOSIS — C67.8 MALIGNANT NEOPLASM OF OVERLAPPING SITES OF BLADDER: Primary | ICD-10-CM

## 2023-09-06 LAB
BILIRUB SERPL-MCNC: NORMAL MG/DL
BLOOD URINE, POC: NORMAL
CLARITY, POC UA: CLEAR
COLOR, POC UA: YELLOW
GLUCOSE UR QL STRIP: NORMAL
KETONES UR QL STRIP: NORMAL
LEUKOCYTE ESTERASE URINE, POC: NORMAL
NITRITE, POC UA: NORMAL
PH, POC UA: 6
PROTEIN, POC: NORMAL
SPECIFIC GRAVITY, POC UA: 1.02
UROBILINOGEN, POC UA: NORMAL

## 2023-09-06 PROCEDURE — 99214 OFFICE O/P EST MOD 30 MIN: CPT | Mod: PBBFAC | Performed by: NURSE PRACTITIONER

## 2023-09-06 PROCEDURE — 81002 URINALYSIS NONAUTO W/O SCOPE: CPT | Mod: PBBFAC | Performed by: NURSE PRACTITIONER

## 2023-09-06 PROCEDURE — 99215 OFFICE O/P EST HI 40 MIN: CPT | Mod: S$PBB,25,, | Performed by: NURSE PRACTITIONER

## 2023-09-06 PROCEDURE — 99999 PR PBB SHADOW E&M-EST. PATIENT-LVL IV: ICD-10-PCS | Mod: PBBFAC,,, | Performed by: NURSE PRACTITIONER

## 2023-09-06 PROCEDURE — 99999PBSHW POCT URINE DIPSTICK WITHOUT MICROSCOPE: Mod: PBBFAC,,,

## 2023-09-06 PROCEDURE — 99999PBSHW PR PBB SHADOW TECHNICAL ONLY FILED TO HB: Mod: PBBFAC,,,

## 2023-09-06 PROCEDURE — 96372 THER/PROPH/DIAG INJ SC/IM: CPT | Mod: PBBFAC | Performed by: NURSE PRACTITIONER

## 2023-09-06 PROCEDURE — 51720 PR INSTILL, ANTICANCER AGENT, BLADDER: ICD-10-PCS | Mod: S$PBB,,, | Performed by: NURSE PRACTITIONER

## 2023-09-06 PROCEDURE — 99215 PR OFFICE/OUTPT VISIT, EST, LEVL V, 40-54 MIN: ICD-10-PCS | Mod: S$PBB,25,, | Performed by: NURSE PRACTITIONER

## 2023-09-06 PROCEDURE — 99999PBSHW POCT URINE DIPSTICK WITHOUT MICROSCOPE: ICD-10-PCS | Mod: PBBFAC,,,

## 2023-09-06 PROCEDURE — 99999 PR PBB SHADOW E&M-EST. PATIENT-LVL IV: CPT | Mod: PBBFAC,,, | Performed by: NURSE PRACTITIONER

## 2023-09-06 PROCEDURE — 51720 TREATMENT OF BLADDER LESION: CPT | Mod: S$PBB,,, | Performed by: NURSE PRACTITIONER

## 2023-09-06 PROCEDURE — 51720 TREATMENT OF BLADDER LESION: CPT | Mod: PBBFAC | Performed by: NURSE PRACTITIONER

## 2023-09-06 RX ADMIN — GEMCITABINE HYDROCHLORIDE 2000 MG: 1 INJECTION, POWDER, LYOPHILIZED, FOR SOLUTION INTRAVENOUS at 10:09

## 2023-09-06 NOTE — PROGRESS NOTES
CHIEF COMPLAINT:    Monroe Paul is a 78 y.o. male presents today for Bladder Cancer.     HISTORY OF PRESENTING ILLINESS:    Monroe Paul is a 78 y.o. male who is an established patient in our clinic.   He is newly diagnosed with NMI Bladder Cancer.   He had original TURBT in [place] and then went on to have repeat resection at [facility].   Last seen in clinic 07/17/2023 with Dr. Luong     08/08/2023 he began Induction GEMCITABINE x 6 dosed.      Here today for induction Gemcitabine dose 5 of 6.   Reports no issue with urination. tolerating well.   No dysuria/hematuria.     Urologic History:      6/9/2023 -- TURBT @ [place] -- HG Ta  6/29/2023 -- CT Urogram -- no nodes, no renal masses, no ureteral filling defects  6/30/2023 -- TURBT @ [facility] -- no tumor              REVIEW OF SYSTEMS:  Review of Systems   Constitutional: Negative.  Negative for chills and fever.   Eyes:  Negative for double vision.   Respiratory:  Negative for cough and shortness of breath.    Cardiovascular:  Negative for chest pain.   Gastrointestinal:  Negative for abdominal pain, constipation, diarrhea, nausea and vomiting.   Genitourinary: Negative.  Negative for dysuria, flank pain and hematuria.   Neurological:  Negative for dizziness and seizures.   Endo/Heme/Allergies:  Negative for polydipsia.         PATIENT HISTORY:    Past Medical History:   Diagnosis Date    Kidney stone        Past Surgical History:   Procedure Laterality Date    BLADDER SURGERY      HERNIA REPAIR         History reviewed. No pertinent family history.    Social History     Socioeconomic History    Marital status:    Tobacco Use    Smoking status: Never    Smokeless tobacco: Never   Substance and Sexual Activity    Alcohol use: Yes    Drug use: Never    Sexual activity: Not Currently       Allergies:  Patient has no known allergies.    Medications:    Current Outpatient Medications:     cholecalciferol, vitamin D3, (VITAMIN D3) 25 mcg (1,000  unit) capsule, Take 5,000 Units by mouth., Disp: , Rfl:     denosumab (PROLIA) 60 mg/mL Syrg, Inject 60 mg into the skin., Disp: , Rfl:     eszopiclone (LUNESTA) 3 mg Tab, Take 3 mg by mouth., Disp: , Rfl:     evolocumab (REPATHA SURECLICK) 140 mg/mL PnIj, Inject 140 mg into the skin., Disp: , Rfl:     ezetimibe (ZETIA) 10 mg tablet, Take 10 mg by mouth., Disp: , Rfl:     hydroCHLOROthiazide (HYDRODIURIL) 50 MG tablet, Take 50 mg by mouth., Disp: , Rfl:     hyoscyamine (LEVSIN) 0.125 mg TbDL, Take by mouth., Disp: , Rfl:     hyoscyamine (LEVSIN) 0.125 mg TbDL, Take 0.125 mg by mouth., Disp: , Rfl:     imiquimod (ALDARA) 5 % cream, Apply 1 packet topically., Disp: , Rfl:     linaCLOtide (LINZESS) 290 mcg Cap capsule, Take 290 mcg by mouth., Disp: , Rfl:     LINZESS 290 mcg Cap capsule, Take 290 mcg by mouth., Disp: , Rfl:     LINZESS 72 mcg Cap capsule, Take 72 mcg by mouth every morning., Disp: , Rfl:     metoprolol tartrate (LOPRESSOR) 50 MG tablet, Take as directed the night before and 1 hour prior to CT., Disp: , Rfl:     metroNIDAZOLE (METROGEL) 0.75 % gel, Apply 1 application  topically., Disp: , Rfl:     potassium chloride (KLOR-CON) 10 MEQ TbSR, Take 10 mEq by mouth., Disp: , Rfl:     rosuvastatin (CRESTOR) 40 MG Tab, Take 40 mg by mouth every evening., Disp: , Rfl:     sodium bicarbonate 650 MG tablet, Take 2 tablets (1,300 mg total) by mouth as needed (start before bladder installation to reduce irritation). 2 tabs night before bladder installation then 2 tabs the morning of the bladder installation, Disp: 24 tablet, Rfl: 1    sulfamethoxazole-trimethoprim 800-160mg (BACTRIM DS) 800-160 mg Tab, Take 1 tablet by mouth 2 (two) times daily., Disp: , Rfl:     tamsulosin (FLOMAX) 0.4 mg Cap, Take 0.4 mg by mouth., Disp: , Rfl:     TURMERIC ORAL, Take 1 capsule by mouth once daily., Disp: , Rfl:     valsartan (DIOVAN) 40 MG tablet, Take 40 mg by mouth., Disp: , Rfl:     ZINC ORAL, Take 1 tablet by mouth once  "daily., Disp: , Rfl:     Current Facility-Administered Medications:     gemcitabine (GEMZAR) 2,000 mg in sodium chloride 0.9% SolP 100 mL bladder instillation, 2,000 mg, Intravesical, 1 time in Clinic/HOD, Connie Steiner NP, 2,000 mg at 09/06/23 1004    PHYSICAL EXAMINATION:  Physical Exam  Vitals and nursing note reviewed.   Constitutional:       General: He is awake.      Appearance: Normal appearance.   HENT:      Head: Normocephalic.      Right Ear: External ear normal.      Left Ear: External ear normal.      Nose: Nose normal.   Cardiovascular:      Rate and Rhythm: Normal rate.   Pulmonary:      Effort: Pulmonary effort is normal. No respiratory distress.   Abdominal:      Tenderness: There is no abdominal tenderness. There is no right CVA tenderness or left CVA tenderness.   Genitourinary:     Penis: Normal.       Testes: Normal.   Musculoskeletal:         General: Normal range of motion.      Cervical back: Normal range of motion.   Skin:     General: Skin is warm and dry.   Neurological:      General: No focal deficit present.      Mental Status: He is alert and oriented to person, place, and time.   Psychiatric:         Mood and Affect: Mood normal.         Behavior: Behavior is cooperative.           LABS:      In office UA today was clear of active infection and blood.       No results found for: "PSA", "PSADIAG", "PSATOTAL", "PHIND"    Lab Results   Component Value Date    CREATININE 0.96 06/29/2023             IMPRESSION:    Encounter Diagnoses   Name Primary?    Malignant neoplasm of overlapping sites of bladder Yes       Induction GEMCITABINE; dose 5 of 6      Assessment:       1. Malignant neoplasm of overlapping sites of bladder        Plan:          I spent 40 minutes with the patient of which more than half was spent in direct consultation with the patient in regards to our treatment and plan.  We addressed the expectations for today's plan of care.  Reviewed the preparation:   Na Bicarb " 1300mg night before then this morning.   No coffee or caffeine this morning;    We discussed their Bladder Cancer.  Discussed previous treatments and the expectations, benefits, risks with this new treatment.  Reviewed how this medication works. Possible side effects.   Discussed importance of post clean up for 6 hours after the 2 hour urination;    Diet modifications; no caffeine the morning of installation; increase water intake at the 2 hour void to flush out.  Sit to urinate; flush twice; no bleach needed.  Clean urethra after each initial urination.   No sex for 48 hours after installation; use of condom during the 6 week installations.  Graves was placed and bladder drained.   I instilled the Gemcitabine 2000mg/100ml then removed catheter.   Tolerated well   He was instructed to hold this up to 120 minutes.   I cleaned urethra and sounding tissue.  Again reviewed post installation instructions.  Recommended lifestyle modifications with proper, healthy diet, good hydration if no fluid restrictions; reducing bladder irritants.   RTC one week for dose 6 of 6.

## 2023-09-21 ENCOUNTER — OFFICE VISIT (OUTPATIENT)
Dept: UROLOGY | Facility: CLINIC | Age: 78
End: 2023-09-21
Payer: MEDICARE

## 2023-09-21 VITALS
DIASTOLIC BLOOD PRESSURE: 97 MMHG | WEIGHT: 196 LBS | BODY MASS INDEX: 26.55 KG/M2 | SYSTOLIC BLOOD PRESSURE: 139 MMHG | HEIGHT: 72 IN | HEART RATE: 81 BPM

## 2023-09-21 DIAGNOSIS — Z85.51 ENCOUNTER FOR FOLLOW-UP SURVEILLANCE OF BLADDER CANCER: ICD-10-CM

## 2023-09-21 DIAGNOSIS — Z08 ENCOUNTER FOR FOLLOW-UP SURVEILLANCE OF BLADDER CANCER: ICD-10-CM

## 2023-09-21 DIAGNOSIS — C67.8 MALIGNANT NEOPLASM OF OVERLAPPING SITES OF BLADDER: Primary | ICD-10-CM

## 2023-09-21 LAB
BILIRUB SERPL-MCNC: NORMAL MG/DL
BLOOD URINE, POC: NORMAL
CLARITY, POC UA: CLEAR
COLOR, POC UA: NORMAL
GLUCOSE UR QL STRIP: NORMAL
KETONES UR QL STRIP: NORMAL
LEUKOCYTE ESTERASE URINE, POC: NORMAL
NITRITE, POC UA: NORMAL
PH, POC UA: 6
PROTEIN, POC: NORMAL
SPECIFIC GRAVITY, POC UA: 1.01
UROBILINOGEN, POC UA: NORMAL

## 2023-09-21 PROCEDURE — 99999 PR PBB SHADOW E&M-EST. PATIENT-LVL V: CPT | Mod: PBBFAC,,, | Performed by: NURSE PRACTITIONER

## 2023-09-21 PROCEDURE — 99215 PR OFFICE/OUTPT VISIT, EST, LEVL V, 40-54 MIN: ICD-10-PCS | Mod: S$PBB,25,, | Performed by: NURSE PRACTITIONER

## 2023-09-21 PROCEDURE — 99999PBSHW POCT URINE DIPSTICK WITHOUT MICROSCOPE: ICD-10-PCS | Mod: PBBFAC,,,

## 2023-09-21 PROCEDURE — 51720 TREATMENT OF BLADDER LESION: CPT | Mod: S$PBB,,, | Performed by: NURSE PRACTITIONER

## 2023-09-21 PROCEDURE — 51720 TREATMENT OF BLADDER LESION: CPT | Mod: PBBFAC | Performed by: NURSE PRACTITIONER

## 2023-09-21 PROCEDURE — 99999PBSHW POCT URINE DIPSTICK WITHOUT MICROSCOPE: Mod: PBBFAC,,,

## 2023-09-21 PROCEDURE — 99215 OFFICE O/P EST HI 40 MIN: CPT | Mod: PBBFAC | Performed by: NURSE PRACTITIONER

## 2023-09-21 PROCEDURE — 99215 OFFICE O/P EST HI 40 MIN: CPT | Mod: S$PBB,25,, | Performed by: NURSE PRACTITIONER

## 2023-09-21 PROCEDURE — 99999 PR PBB SHADOW E&M-EST. PATIENT-LVL V: ICD-10-PCS | Mod: PBBFAC,,, | Performed by: NURSE PRACTITIONER

## 2023-09-21 PROCEDURE — 51720 PR INSTILL, ANTICANCER AGENT, BLADDER: ICD-10-PCS | Mod: S$PBB,,, | Performed by: NURSE PRACTITIONER

## 2023-09-21 PROCEDURE — 99999PBSHW PR PBB SHADOW TECHNICAL ONLY FILED TO HB: Mod: PBBFAC,,,

## 2023-09-21 PROCEDURE — 81002 URINALYSIS NONAUTO W/O SCOPE: CPT | Mod: PBBFAC | Performed by: NURSE PRACTITIONER

## 2023-09-21 PROCEDURE — 96372 THER/PROPH/DIAG INJ SC/IM: CPT | Mod: PBBFAC | Performed by: NURSE PRACTITIONER

## 2023-09-21 RX ORDER — OXYCODONE HYDROCHLORIDE 5 MG/1
TABLET ORAL
COMMUNITY
Start: 2023-09-14

## 2023-09-21 RX ORDER — AMOXICILLIN 500 MG
2 CAPSULE ORAL NIGHTLY
COMMUNITY

## 2023-09-21 RX ORDER — METOPROLOL SUCCINATE 25 MG/1
25 TABLET, EXTENDED RELEASE ORAL
COMMUNITY
Start: 2023-09-14

## 2023-09-21 RX ORDER — GABAPENTIN 300 MG/1
CAPSULE ORAL
COMMUNITY
Start: 2023-09-14

## 2023-09-21 RX ORDER — LIDOCAINE HYDROCHLORIDE 20 MG/ML
JELLY TOPICAL ONCE
Status: CANCELLED | OUTPATIENT
Start: 2023-09-21 | End: 2023-09-21

## 2023-09-21 RX ORDER — CIPROFLOXACIN 500 MG/1
500 TABLET ORAL ONCE
Status: CANCELLED | OUTPATIENT
Start: 2023-09-21 | End: 2023-09-21

## 2023-09-21 RX ADMIN — GEMCITABINE HYDROCHLORIDE 2000 MG: 1 INJECTION, SOLUTION INTRAVENOUS at 09:09

## 2023-09-21 NOTE — PROGRESS NOTES
CHIEF COMPLAINT:    Monroe Paul is a 78 y.o. male presents today for Bladder Cancer    HISTORY OF PRESENTING ILLINESS:    Monroe Paul is a 78 y.o. male who is an established patient in our clinic.   He is newly diagnosed with NMI Bladder Cancer.   He had original TURBT in [place] and then went on to have repeat resection at [facility] [place].   Last seen in clinic 07/17/2023 with Dr. Luong     08/08/2023 he began Induction GEMCITABINE x 6 doses.      Here today for induction Gemcitabine dose 6 of 6.   Reports no issue with urination. tolerating well.   No dysuria/hematuria.     Urologic History:      6/9/2023 -- TURBT @ [place] -- HG Ta  6/29/2023 -- CT Urogram -- no nodes, no renal masses, no ureteral filling defects  6/30/2023 -- TURBT @ [facility] [place] -- no tumor  08/08/2023-09/21/2023 -- Completed Induction Gemcitabine.            REVIEW OF SYSTEMS:  Review of Systems   Constitutional: Negative.  Negative for chills and fever.   Eyes:  Negative for double vision.   Respiratory:  Negative for cough and shortness of breath.    Cardiovascular:  Negative for chest pain.   Gastrointestinal:  Negative for abdominal pain, constipation, diarrhea, nausea and vomiting.   Genitourinary: Negative.  Negative for dysuria, flank pain and hematuria.   Musculoskeletal:         Broke left arm; s/p surgery.    Neurological:  Negative for dizziness and seizures.   Endo/Heme/Allergies:  Negative for polydipsia.         PATIENT HISTORY:    Past Medical History:   Diagnosis Date    Kidney stone        Past Surgical History:   Procedure Laterality Date    BLADDER SURGERY      HERNIA REPAIR         History reviewed. No pertinent family history.    Social History     Socioeconomic History    Marital status:    Tobacco Use    Smoking status: Never    Smokeless tobacco: Never   Substance and Sexual Activity    Alcohol use: Yes    Drug use: Never    Sexual activity: Not Currently       Allergies:  Patient has no known  allergies.    Medications:    Current Outpatient Medications:     cholecalciferol, vitamin D3, (VITAMIN D3) 25 mcg (1,000 unit) capsule, Take 5,000 Units by mouth., Disp: , Rfl:     denosumab (PROLIA) 60 mg/mL Syrg, Inject 60 mg into the skin., Disp: , Rfl:     eszopiclone (LUNESTA) 3 mg Tab, Take 3 mg by mouth., Disp: , Rfl:     evolocumab (REPATHA SURECLICK) 140 mg/mL PnIj, Inject 140 mg into the skin., Disp: , Rfl:     ezetimibe (ZETIA) 10 mg tablet, Take 10 mg by mouth., Disp: , Rfl:     gabapentin (NEURONTIN) 300 MG capsule, Take by mouth., Disp: , Rfl:     hydroCHLOROthiazide (HYDRODIURIL) 50 MG tablet, Take 50 mg by mouth., Disp: , Rfl:     hyoscyamine (LEVSIN) 0.125 mg TbDL, Take by mouth., Disp: , Rfl:     hyoscyamine (LEVSIN) 0.125 mg TbDL, Take 0.125 mg by mouth., Disp: , Rfl:     imiquimod (ALDARA) 5 % cream, Apply 1 packet topically., Disp: , Rfl:     linaCLOtide (LINZESS) 290 mcg Cap capsule, Take 290 mcg by mouth., Disp: , Rfl:     LINZESS 290 mcg Cap capsule, Take 290 mcg by mouth., Disp: , Rfl:     LINZESS 72 mcg Cap capsule, Take 72 mcg by mouth every morning., Disp: , Rfl:     metoprolol succinate (TOPROL-XL) 25 MG 24 hr tablet, Take 25 mg by mouth., Disp: , Rfl:     metoprolol tartrate (LOPRESSOR) 50 MG tablet, Take as directed the night before and 1 hour prior to CT., Disp: , Rfl:     metroNIDAZOLE (METROGEL) 0.75 % gel, Apply 1 application  topically., Disp: , Rfl:     omega-3 fatty acids/fish oil (FISH OIL-OMEGA-3 FATTY ACIDS) 300-1,000 mg capsule, Take 2 capsules by mouth every evening., Disp: , Rfl:     oxyCODONE (ROXICODONE) 5 MG immediate release tablet, Take by mouth., Disp: , Rfl:     potassium chloride (KLOR-CON) 10 MEQ TbSR, Take 10 mEq by mouth., Disp: , Rfl:     rosuvastatin (CRESTOR) 40 MG Tab, Take 40 mg by mouth every evening., Disp: , Rfl:     sodium bicarbonate 650 MG tablet, Take 2 tablets (1,300 mg total) by mouth as needed (start before bladder installation to reduce  "irritation). 2 tabs night before bladder installation then 2 tabs the morning of the bladder installation, Disp: 24 tablet, Rfl: 1    sulfamethoxazole-trimethoprim 800-160mg (BACTRIM DS) 800-160 mg Tab, Take 1 tablet by mouth 2 (two) times daily., Disp: , Rfl:     tamsulosin (FLOMAX) 0.4 mg Cap, Take 0.4 mg by mouth., Disp: , Rfl:     TURMERIC ORAL, Take 1 capsule by mouth once daily., Disp: , Rfl:     valsartan (DIOVAN) 40 MG tablet, Take 40 mg by mouth., Disp: , Rfl:     ZINC ORAL, Take 1 tablet by mouth once daily., Disp: , Rfl:     Current Facility-Administered Medications:     gemcitabine (GEMZAR) 2,000 mg in sodium chloride 0.9% SolP 100 mL bladder instillation, 2,000 mg, Intravesical, 1 time in Clinic/HOD, Connie Steiner NP    PHYSICAL EXAMINATION:  Physical Exam  Vitals and nursing note reviewed.   Constitutional:       General: He is awake.      Appearance: Normal appearance.   HENT:      Head: Normocephalic.      Right Ear: External ear normal.      Left Ear: External ear normal.      Nose: Nose normal.   Cardiovascular:      Rate and Rhythm: Normal rate.   Pulmonary:      Effort: Pulmonary effort is normal. No respiratory distress.   Abdominal:      Tenderness: There is no abdominal tenderness. There is no right CVA tenderness or left CVA tenderness.   Genitourinary:     Penis: Normal.       Testes: Normal.   Musculoskeletal:         General: Normal range of motion.      Cervical back: Normal range of motion.   Skin:     General: Skin is warm and dry.   Neurological:      General: No focal deficit present.      Mental Status: He is alert and oriented to person, place, and time.   Psychiatric:         Mood and Affect: Mood normal.         Behavior: Behavior is cooperative.           LABS:      In office UA today was clear of active infection or GH      No results found for: "PSA", "PSADIAG", "PSATOTAL", "PHIND"    Lab Results   Component Value Date    CREATININE 0.96 06/29/2023 "             IMPRESSION:    Encounter Diagnoses   Name Primary?    Malignant neoplasm of overlapping sites of bladder Yes    Encounter for follow-up surveillance of bladder cancer        Induction GEMCITABINE; dose 6 of 6      Assessment:       1. Malignant neoplasm of overlapping sites of bladder    2. Encounter for follow-up surveillance of bladder cancer        Plan:          I spent 40 minutes with the patient of which more than half was spent in direct consultation with the patient in regards to our treatment and plan.  We addressed the expectations for today's plan of care.  Reviewed the preparation:   Na Bicarb 1300mg night before then this morning.   No coffee or caffeine this morning;    We discussed their Bladder Cancer.  Discussed previous treatments and the expectations, benefits, risks with this new treatment.  Reviewed how this medication works. Possible side effects.   Discussed importance of post clean up for 6 hours after the 2 hour urination;    Diet modifications; no caffeine the morning of installation; increase water intake at the 2 hour void to flush out.  Sit to urinate; flush twice; no bleach needed.  Clean urethra after each initial urination.   No sex for 48 hours after installation; use of condom during the 6 week installations.  Graves was placed and bladder drained.   I instilled the Gemcitabine 2000mg/100ml then removed catheter.   Tolerated well   He was instructed to hold this up to 120 minutes.   I cleaned urethra and sounding tissue.  Again reviewed post installation instructions.  Recommended lifestyle modifications with proper, healthy diet, good hydration if no fluid restrictions; reducing bladder irritants.   Cysto with Dr. Luong in 6 weeks

## 2023-10-24 ENCOUNTER — PATIENT MESSAGE (OUTPATIENT)
Dept: INTERNAL MEDICINE | Facility: CLINIC | Age: 78
End: 2023-10-24
Payer: COMMERCIAL

## 2023-11-02 ENCOUNTER — PROCEDURE VISIT (OUTPATIENT)
Dept: UROLOGY | Facility: CLINIC | Age: 78
End: 2023-11-02
Payer: MEDICARE

## 2023-11-02 VITALS
HEART RATE: 83 BPM | WEIGHT: 194.44 LBS | RESPIRATION RATE: 17 BRPM | DIASTOLIC BLOOD PRESSURE: 78 MMHG | TEMPERATURE: 98 F | BODY MASS INDEX: 26.37 KG/M2 | SYSTOLIC BLOOD PRESSURE: 139 MMHG

## 2023-11-02 DIAGNOSIS — C67.8 MALIGNANT NEOPLASM OF OVERLAPPING SITES OF BLADDER: ICD-10-CM

## 2023-11-02 PROCEDURE — 88112 CYTOPATH CELL ENHANCE TECH: CPT | Mod: 26,,, | Performed by: PATHOLOGY

## 2023-11-02 PROCEDURE — 88112 CYTOPATH CELL ENHANCE TECH: CPT | Performed by: PATHOLOGY

## 2023-11-02 PROCEDURE — 88112 PR  CYTOPATH, CELL ENHANCE TECH: ICD-10-PCS | Mod: 26,,, | Performed by: PATHOLOGY

## 2023-11-02 PROCEDURE — 52000 PR CYSTOURETHROSCOPY: ICD-10-PCS | Mod: ,,, | Performed by: STUDENT IN AN ORGANIZED HEALTH CARE EDUCATION/TRAINING PROGRAM

## 2023-11-02 PROCEDURE — 52000 CYSTOURETHROSCOPY: CPT | Mod: ,,, | Performed by: STUDENT IN AN ORGANIZED HEALTH CARE EDUCATION/TRAINING PROGRAM

## 2023-11-02 RX ORDER — LIDOCAINE HYDROCHLORIDE 20 MG/ML
JELLY TOPICAL
Status: COMPLETED | OUTPATIENT
Start: 2023-11-02 | End: 2023-11-02

## 2023-11-02 RX ADMIN — LIDOCAINE HYDROCHLORIDE: 20 JELLY TOPICAL at 12:11

## 2023-11-02 NOTE — PROCEDURES
Office Cystoscopy Procedure Note    Date of Procedure: 11/02/2023    Indication:  Urothelial carcinoma of the bladder      Informed consent:  The risks, benefits, complications, treatment options, and expected outcomes were discussed with the patient. The patient concurred with the proposed plan and provided informed consent.     Anesthesia: Lidocaine jelly 2%     Antibiotic prophylaxis: None     Procedure:  The patient was placed in the lithotomy position, was prepped and draped in the usual manner using sterile technique, and 2% lidocaine jelly instilled into the urethra.  A 17 F flexible cystoscope was then inserted into the urethra and the urethra and bladder carefully examined.  The following findings were noted:       Findings:   Urethra: normal     Prostate: bilobar hypertrophy    Bladder: no lesions    Ureteral orifices:       -Right: Not identified       -Left: Not identified     Other findings:     None        Specimens: None                   Complications: None; patient tolerated the procedure well            Disposition: Home after brief observation     Condition: Stable     Plan: Maintenance gemcitabine -- f/u in 6 months for cystoscopy    Attending Attestation:      I personally performed the procedure.       Junior Luong MD  Urologic Oncology  P: [phone]

## 2023-11-02 NOTE — PATIENT INSTRUCTIONS
What to Expect After a Cystoscopy  For the next 24-48 hours, you may feel a mild burning when you urinate. This burning is normal and expected. Drink plenty of water to dilute the urine to help relieve the burning sensation. You may also see a small amount of blood in your urine after the procedure.    Unless you are already taking antibiotics, you may be given an antibiotic after the test to prevent infection.    Signs and Symptoms to Report  Call the [facility] at [phone] if you develop any of the following:  Fever of 101 degrees or higher  Chills or persistent bleeding  Inability to urinate

## 2023-11-03 LAB
FINAL PATHOLOGIC DIAGNOSIS: NORMAL
Lab: NORMAL

## 2023-11-07 ENCOUNTER — PATIENT MESSAGE (OUTPATIENT)
Dept: UROLOGY | Facility: CLINIC | Age: 78
End: 2023-11-07
Payer: COMMERCIAL

## 2023-11-20 ENCOUNTER — OFFICE VISIT (OUTPATIENT)
Dept: UROLOGY | Facility: CLINIC | Age: 78
End: 2023-11-20
Payer: MEDICARE

## 2023-11-20 VITALS
WEIGHT: 192.13 LBS | SYSTOLIC BLOOD PRESSURE: 117 MMHG | HEART RATE: 105 BPM | HEIGHT: 72 IN | BODY MASS INDEX: 26.02 KG/M2 | DIASTOLIC BLOOD PRESSURE: 80 MMHG

## 2023-11-20 DIAGNOSIS — Z08 ENCOUNTER FOR FOLLOW-UP SURVEILLANCE OF BLADDER CANCER: ICD-10-CM

## 2023-11-20 DIAGNOSIS — C67.8 MALIGNANT NEOPLASM OF OVERLAPPING SITES OF BLADDER: Primary | ICD-10-CM

## 2023-11-20 DIAGNOSIS — Z85.51 ENCOUNTER FOR FOLLOW-UP SURVEILLANCE OF BLADDER CANCER: ICD-10-CM

## 2023-11-20 LAB
BILIRUB SERPL-MCNC: NORMAL MG/DL
BLOOD URINE, POC: NORMAL
CLARITY, POC UA: CLEAR
COLOR, POC UA: NORMAL
GLUCOSE UR QL STRIP: NORMAL
KETONES UR QL STRIP: NORMAL
LEUKOCYTE ESTERASE URINE, POC: NORMAL
NITRITE, POC UA: NORMAL
PH, POC UA: 7
PROTEIN, POC: NORMAL
SPECIFIC GRAVITY, POC UA: 1.01
UROBILINOGEN, POC UA: NORMAL

## 2023-11-20 PROCEDURE — 99999PBSHW PR PBB SHADOW TECHNICAL ONLY FILED TO HB: Mod: PBBFAC,,,

## 2023-11-20 PROCEDURE — 99999PBSHW POCT URINE DIPSTICK WITHOUT MICROSCOPE: Mod: PBBFAC,,,

## 2023-11-20 PROCEDURE — 99999PBSHW PR PBB SHADOW TECHNICAL ONLY FILED TO HB: ICD-10-PCS | Mod: PBBFAC,,,

## 2023-11-20 PROCEDURE — 99499 NO LOS: ICD-10-PCS | Mod: S$PBB,,, | Performed by: NURSE PRACTITIONER

## 2023-11-20 PROCEDURE — 96372 THER/PROPH/DIAG INJ SC/IM: CPT | Mod: PBBFAC | Performed by: NURSE PRACTITIONER

## 2023-11-20 PROCEDURE — 99999 PR PBB SHADOW E&M-EST. PATIENT-LVL V: ICD-10-PCS | Mod: PBBFAC,,, | Performed by: NURSE PRACTITIONER

## 2023-11-20 PROCEDURE — 99215 OFFICE O/P EST HI 40 MIN: CPT | Mod: PBBFAC | Performed by: NURSE PRACTITIONER

## 2023-11-20 PROCEDURE — 99499 UNLISTED E&M SERVICE: CPT | Mod: S$PBB,,, | Performed by: NURSE PRACTITIONER

## 2023-11-20 PROCEDURE — 51720 TREATMENT OF BLADDER LESION: CPT | Mod: PBBFAC | Performed by: NURSE PRACTITIONER

## 2023-11-20 PROCEDURE — 99999 PR PBB SHADOW E&M-EST. PATIENT-LVL V: CPT | Mod: PBBFAC,,, | Performed by: NURSE PRACTITIONER

## 2023-11-20 PROCEDURE — 81002 URINALYSIS NONAUTO W/O SCOPE: CPT | Mod: PBBFAC | Performed by: NURSE PRACTITIONER

## 2023-11-20 RX ADMIN — GEMCITABINE HYDROCHLORIDE 2000 MG: 1 INJECTION, SOLUTION INTRAVENOUS at 09:11

## 2023-11-20 NOTE — PROGRESS NOTES
CHIEF COMPLAINT:    Monroe Paul is a 78 y.o. male presents today for Bladder Cancer.     HISTORY OF PRESENTING ILLINESS:    Monroe Paul is a 78 y.o. male who is an established patient in our clinic.   He is newly diagnosed with NMI Bladder Cancer.   He had original TURBT in [place] and then went on to have repeat resection at [facility].   He was seen in clinic 07/17/2023 with Dr. Luong     09/21/2023 he completed Induction GEMCITABINE x 6 doses.   11/02/2023 (-) cysto with Dr. Luong.     Here today for Maintenance Gemcitabine dose 1 of 12.   Reports no issue with urination. tolerating well.   Did not take Na Bicarb.  No dysuria/hematuria.       Urologic History:      6/9/2023 -- TURBT @ [place] -- HG Ta  6/29/2023 -- CT Urogram -- no nodes, no renal masses, no ureteral filling defects  6/30/2023 -- TURBT @ [facility] -- no tumor  08/08/2023-09/21/2023 -- Completed Induction Gemcitabine.   11/02/2023 (-) cysto with Dr. Luong.            REVIEW OF SYSTEMS:  Review of Systems   Constitutional: Negative.  Negative for chills and fever.   Eyes:  Negative for double vision.   Respiratory:  Negative for cough and shortness of breath.    Cardiovascular:  Negative for chest pain.   Gastrointestinal:  Negative for abdominal pain, constipation, diarrhea, nausea and vomiting.   Genitourinary: Negative.  Negative for dysuria, flank pain and hematuria.        Ok with urination.  No dysuria/hematuria.     Neurological:  Negative for dizziness and seizures.   Endo/Heme/Allergies:  Negative for polydipsia.         PATIENT HISTORY:    Past Medical History:   Diagnosis Date    Kidney stone        Past Surgical History:   Procedure Laterality Date    BLADDER SURGERY      HERNIA REPAIR         History reviewed. No pertinent family history.    Social History     Socioeconomic History    Marital status:    Tobacco Use    Smoking status: Never    Smokeless tobacco: Never   Substance and Sexual Activity    Alcohol use:  Yes    Drug use: Never    Sexual activity: Not Currently       Allergies:  Patient has no known allergies.    Medications:    Current Outpatient Medications:     cholecalciferol, vitamin D3, (VITAMIN D3) 25 mcg (1,000 unit) capsule, Take 5,000 Units by mouth., Disp: , Rfl:     denosumab (PROLIA) 60 mg/mL Syrg, Inject 60 mg into the skin., Disp: , Rfl:     eszopiclone (LUNESTA) 3 mg Tab, Take 3 mg by mouth., Disp: , Rfl:     evolocumab (REPATHA SURECLICK) 140 mg/mL PnIj, Inject 140 mg into the skin., Disp: , Rfl:     ezetimibe (ZETIA) 10 mg tablet, Take 10 mg by mouth., Disp: , Rfl:     gabapentin (NEURONTIN) 300 MG capsule, Take by mouth., Disp: , Rfl:     hydroCHLOROthiazide (HYDRODIURIL) 50 MG tablet, Take 50 mg by mouth., Disp: , Rfl:     hyoscyamine (LEVSIN) 0.125 mg TbDL, Take by mouth., Disp: , Rfl:     hyoscyamine (LEVSIN) 0.125 mg TbDL, Take 0.125 mg by mouth., Disp: , Rfl:     imiquimod (ALDARA) 5 % cream, Apply 1 packet topically., Disp: , Rfl:     linaCLOtide (LINZESS) 290 mcg Cap capsule, Take 290 mcg by mouth., Disp: , Rfl:     LINZESS 290 mcg Cap capsule, Take 290 mcg by mouth., Disp: , Rfl:     LINZESS 72 mcg Cap capsule, Take 72 mcg by mouth every morning., Disp: , Rfl:     metoprolol succinate (TOPROL-XL) 25 MG 24 hr tablet, Take 25 mg by mouth., Disp: , Rfl:     metoprolol tartrate (LOPRESSOR) 50 MG tablet, Take as directed the night before and 1 hour prior to CT., Disp: , Rfl:     metroNIDAZOLE (METROGEL) 0.75 % gel, Apply 1 application  topically., Disp: , Rfl:     omega-3 fatty acids/fish oil (FISH OIL-OMEGA-3 FATTY ACIDS) 300-1,000 mg capsule, Take 2 capsules by mouth every evening., Disp: , Rfl:     oxyCODONE (ROXICODONE) 5 MG immediate release tablet, Take by mouth., Disp: , Rfl:     potassium chloride (KLOR-CON) 10 MEQ TbSR, Take 10 mEq by mouth., Disp: , Rfl:     rosuvastatin (CRESTOR) 40 MG Tab, Take 40 mg by mouth every evening., Disp: , Rfl:     sodium bicarbonate 650 MG tablet, Take 2  "tablets (1,300 mg total) by mouth as needed (start before bladder installation to reduce irritation). 2 tabs night before bladder installation then 2 tabs the morning of the bladder installation, Disp: 24 tablet, Rfl: 1    sulfamethoxazole-trimethoprim 800-160mg (BACTRIM DS) 800-160 mg Tab, Take 1 tablet by mouth 2 (two) times daily., Disp: , Rfl:     tamsulosin (FLOMAX) 0.4 mg Cap, Take 0.4 mg by mouth., Disp: , Rfl:     TURMERIC ORAL, Take 1 capsule by mouth once daily., Disp: , Rfl:     valsartan (DIOVAN) 40 MG tablet, Take 40 mg by mouth., Disp: , Rfl:     ZINC ORAL, Take 1 tablet by mouth once daily., Disp: , Rfl:     Current Facility-Administered Medications:     gemcitabine (GEMZAR) 2,000 mg in sodium chloride 0.9% SolP 100 mL bladder instillation, 2,000 mg, Intravesical, 1 time in Clinic/HOD, Junior Luong MD    PHYSICAL EXAMINATION:  Physical Exam  Vitals and nursing note reviewed.   Constitutional:       General: He is awake.      Appearance: Normal appearance.   HENT:      Head: Normocephalic.      Right Ear: External ear normal.      Left Ear: External ear normal.      Nose: Nose normal.   Cardiovascular:      Rate and Rhythm: Normal rate.   Pulmonary:      Effort: Pulmonary effort is normal. No respiratory distress.   Abdominal:      Tenderness: There is no abdominal tenderness. There is no right CVA tenderness or left CVA tenderness.   Genitourinary:     Penis: Normal.       Testes: Normal.   Musculoskeletal:         General: Normal range of motion.      Cervical back: Normal range of motion.   Skin:     General: Skin is warm and dry.   Neurological:      General: No focal deficit present.      Mental Status: He is alert and oriented to person, place, and time.   Psychiatric:         Mood and Affect: Mood normal.         Behavior: Behavior is cooperative.           LABS:      In office UA today was clear of active infection and GH.       No results found for: "PSA", "PSADIAG", "PSATOTAL", " ""PHIND"    Lab Results   Component Value Date    CREATININE 0.96 06/29/2023             IMPRESSION:    Encounter Diagnoses   Name Primary?    Malignant neoplasm of overlapping sites of bladder Yes    Encounter for follow-up surveillance of bladder cancer        Maintenance GEMCITABINE; dose 1 of 12      Assessment:       1. Malignant neoplasm of overlapping sites of bladder    2. Encounter for follow-up surveillance of bladder cancer        Plan:           I spent 40 minutes with the patient of which more than half was spent in direct consultation with the patient in regards to our treatment and plan.  We addressed the expectations for today's plan of care.  Reviewed the preparation:   Na Bicarb 1300mg night before then this morning.   No coffee or caffeine this morning;    We discussed their Bladder Cancer.  Discussed previous treatments and the expectations, benefits, risks with this new treatment.  Reviewed how this medication works. Possible side effects.   Discussed importance of post clean up for 6 hours after the 2 hour urination;    Diet modifications; no caffeine the morning of installation; increase water intake at the 2 hour void to flush out.  Sit to urinate; flush twice; no bleach needed.  Clean urethra after each initial urination.   No sex for 48 hours after installation; use of condom during the 6 week installations.  Graves was placed and bladder drained.   I instilled the Gemcitabine 2000mg/100ml then removed catheter.   Tolerated well   He was instructed to hold this up to 120 minutes.  I cleaned urethra and sounding tissue.  Again reviewed post installation instructions.  Recommended lifestyle modifications with proper, healthy diet, good hydration if no fluid restrictions; reducing bladder irritants.   RTC in 4 weeks for Dose 2 of 12.   Cysto with Dr. Luong 05/02/2024.     "

## 2023-12-12 ENCOUNTER — TELEPHONE (OUTPATIENT)
Dept: UROLOGY | Facility: CLINIC | Age: 78
End: 2023-12-12
Payer: COMMERCIAL

## 2023-12-12 DIAGNOSIS — C67.8 MALIGNANT NEOPLASM OF OVERLAPPING SITES OF BLADDER: ICD-10-CM

## 2023-12-12 RX ORDER — SODIUM BICARBONATE 650 MG/1
1300 TABLET ORAL
Qty: 24 TABLET | Refills: 1 | Status: SHIPPED | OUTPATIENT
Start: 2023-12-12 | End: 2024-12-11

## 2023-12-12 NOTE — TELEPHONE ENCOUNTER
----- Message from Cari Brantley LPN sent at 12/11/2023  1:23 PM CST -----  Regarding: FW: RX    ----- Message -----  From: To Crenshaw RN  Sent: 12/11/2023  10:29 AM CST  To: [facility] Staff  Subject: RX                                               Patient calling regarding RX needed prior to appt next week.  Please send rx to [facility] on [address] and let patient know once sent.   Thanks,  To

## 2023-12-18 ENCOUNTER — OFFICE VISIT (OUTPATIENT)
Dept: UROLOGY | Facility: CLINIC | Age: 78
End: 2023-12-18
Payer: COMMERCIAL

## 2023-12-18 DIAGNOSIS — C67.8 MALIGNANT NEOPLASM OF OVERLAPPING SITES OF BLADDER: Primary | ICD-10-CM

## 2023-12-18 PROCEDURE — 99999 PR PBB SHADOW E&M-EST. PATIENT-LVL III: CPT | Mod: PBBFAC,,, | Performed by: NURSE PRACTITIONER

## 2023-12-18 PROCEDURE — 99999 PR PBB SHADOW E&M-EST. PATIENT-LVL III: ICD-10-PCS | Mod: PBBFAC,,, | Performed by: NURSE PRACTITIONER

## 2023-12-18 PROCEDURE — 51720 TREATMENT OF BLADDER LESION: CPT | Mod: PBBFAC | Performed by: NURSE PRACTITIONER

## 2023-12-18 PROCEDURE — 99213 OFFICE O/P EST LOW 20 MIN: CPT | Mod: PBBFAC | Performed by: NURSE PRACTITIONER

## 2023-12-18 PROCEDURE — 96372 THER/PROPH/DIAG INJ SC/IM: CPT | Mod: PBBFAC | Performed by: NURSE PRACTITIONER

## 2023-12-18 PROCEDURE — 99999PBSHW PR PBB SHADOW TECHNICAL ONLY FILED TO HB: ICD-10-PCS | Mod: PBBFAC,,,

## 2023-12-18 PROCEDURE — 51720 PR INSTILL, ANTICANCER AGENT, BLADDER: ICD-10-PCS | Mod: S$PBB,,, | Performed by: NURSE PRACTITIONER

## 2023-12-18 PROCEDURE — 99999PBSHW PR PBB SHADOW TECHNICAL ONLY FILED TO HB: Mod: PBBFAC,,,

## 2023-12-18 PROCEDURE — 99215 OFFICE O/P EST HI 40 MIN: CPT | Mod: S$PBB,25,, | Performed by: NURSE PRACTITIONER

## 2023-12-18 PROCEDURE — 99215 PR OFFICE/OUTPT VISIT, EST, LEVL V, 40-54 MIN: ICD-10-PCS | Mod: S$PBB,25,, | Performed by: NURSE PRACTITIONER

## 2023-12-18 PROCEDURE — 51720 TREATMENT OF BLADDER LESION: CPT | Mod: S$PBB,,, | Performed by: NURSE PRACTITIONER

## 2023-12-18 RX ADMIN — GEMCITABINE 2000 MG: 38 INJECTION, SOLUTION INTRAVENOUS at 12:12

## 2023-12-18 NOTE — PROGRESS NOTES
CHIEF COMPLAINT:    Monroe Paul is a 78 y.o. male presents today for Bladder Cancer    HISTORY OF PRESENTING ILLINESS:    Monroe Paul is a 78 y.o. male who is an established patient in our clinic.   He is newly diagnosed with NMI Bladder Cancer.   He had original TURBT in [place] and then went on to have repeat resection at [facility], [place].   He was seen in clinic 07/17/2023 with Dr. Luong     09/21/2023 he completed Induction GEMCITABINE x 6 doses.   11/02/2023 (-) cysto with Dr. Luong.      11/20/2023 he began Maintenance Gemcitabine; monthly x 12 doses. .     Here today for Maintenance Gemcitabine; dose 2 of 12.   Reports he was not able to hold it for 2 hours; basically only 15 minutes.  He believes it was due to painful catheter placement.   He took his NA Bicarb this time.   Last night only got up once to urinate.   No dysuria/hematuria.        Urologic History:      6/9/2023 -- TURBT @ [place] -- HG Ta  6/29/2023 -- CT Urogram -- no nodes, no renal masses, no ureteral filling defects  6/30/2023 -- TURBT @ [facility], [place] -- no tumor  08/08/2023-09/21/2023 -- Completed Induction Gemcitabine.   11/02/2023 (-) cysto with Dr. Luong.            REVIEW OF SYSTEMS:  Review of Systems   Constitutional: Negative.  Negative for chills and fever.   Eyes:  Negative for double vision.   Respiratory:  Negative for cough and shortness of breath.    Cardiovascular:  Negative for chest pain.   Gastrointestinal:  Negative for abdominal pain, constipation, diarrhea, nausea and vomiting.   Genitourinary: Negative.  Negative for dysuria, flank pain and hematuria.   Neurological:  Negative for dizziness and seizures.   Endo/Heme/Allergies:  Negative for polydipsia.         PATIENT HISTORY:    Past Medical History:   Diagnosis Date    Kidney stone        Past Surgical History:   Procedure Laterality Date    BLADDER SURGERY      HERNIA REPAIR         History reviewed. No pertinent family history.    Social History      Socioeconomic History    Marital status:    Tobacco Use    Smoking status: Never    Smokeless tobacco: Never   Substance and Sexual Activity    Alcohol use: Yes    Drug use: Never    Sexual activity: Not Currently       Allergies:  Patient has no known allergies.    Medications:    Current Outpatient Medications:     cholecalciferol, vitamin D3, (VITAMIN D3) 25 mcg (1,000 unit) capsule, Take 5,000 Units by mouth., Disp: , Rfl:     denosumab (PROLIA) 60 mg/mL Syrg, Inject 60 mg into the skin., Disp: , Rfl:     eszopiclone (LUNESTA) 3 mg Tab, Take 3 mg by mouth., Disp: , Rfl:     evolocumab (REPATHA SURECLICK) 140 mg/mL PnIj, Inject 140 mg into the skin., Disp: , Rfl:     ezetimibe (ZETIA) 10 mg tablet, Take 10 mg by mouth., Disp: , Rfl:     gabapentin (NEURONTIN) 300 MG capsule, Take by mouth., Disp: , Rfl:     hydroCHLOROthiazide (HYDRODIURIL) 50 MG tablet, Take 50 mg by mouth., Disp: , Rfl:     hyoscyamine (LEVSIN) 0.125 mg TbDL, Take by mouth., Disp: , Rfl:     hyoscyamine (LEVSIN) 0.125 mg TbDL, Take 0.125 mg by mouth., Disp: , Rfl:     imiquimod (ALDARA) 5 % cream, Apply 1 packet topically., Disp: , Rfl:     linaCLOtide (LINZESS) 290 mcg Cap capsule, Take 290 mcg by mouth., Disp: , Rfl:     LINZESS 290 mcg Cap capsule, Take 290 mcg by mouth., Disp: , Rfl:     LINZESS 72 mcg Cap capsule, Take 72 mcg by mouth every morning., Disp: , Rfl:     metoprolol succinate (TOPROL-XL) 25 MG 24 hr tablet, Take 25 mg by mouth., Disp: , Rfl:     metoprolol tartrate (LOPRESSOR) 50 MG tablet, Take as directed the night before and 1 hour prior to CT., Disp: , Rfl:     metroNIDAZOLE (METROGEL) 0.75 % gel, Apply 1 application  topically., Disp: , Rfl:     omega-3 fatty acids/fish oil (FISH OIL-OMEGA-3 FATTY ACIDS) 300-1,000 mg capsule, Take 2 capsules by mouth every evening., Disp: , Rfl:     oxyCODONE (ROXICODONE) 5 MG immediate release tablet, Take by mouth., Disp: , Rfl:     potassium chloride (KLOR-CON) 10 MEQ TbSR,  Take 10 mEq by mouth., Disp: , Rfl:     rosuvastatin (CRESTOR) 40 MG Tab, Take 40 mg by mouth every evening., Disp: , Rfl:     sodium bicarbonate 650 MG tablet, Take 2 tablets (1,300 mg total) by mouth as needed (start before bladder installation to reduce irritation). 2 tabs night before bladder installation then 2 tabs the morning of the bladder installation, Disp: 24 tablet, Rfl: 1    sulfamethoxazole-trimethoprim 800-160mg (BACTRIM DS) 800-160 mg Tab, Take 1 tablet by mouth 2 (two) times daily., Disp: , Rfl:     tamsulosin (FLOMAX) 0.4 mg Cap, Take 0.4 mg by mouth., Disp: , Rfl:     TURMERIC ORAL, Take 1 capsule by mouth once daily., Disp: , Rfl:     valsartan (DIOVAN) 40 MG tablet, Take 40 mg by mouth., Disp: , Rfl:     ZINC ORAL, Take 1 tablet by mouth once daily., Disp: , Rfl:     Current Facility-Administered Medications:     gemcitabine (GEMZAR) 2,000 mg in sodium chloride 0.9% SolP 100 mL bladder instillation, 2,000 mg, Intravesical, 1 time in Clinic/HOD, Junior Luong MD    PHYSICAL EXAMINATION:  Physical Exam  Vitals and nursing note reviewed.   Constitutional:       General: He is awake.      Appearance: Normal appearance.   HENT:      Head: Normocephalic.      Right Ear: External ear normal.      Left Ear: External ear normal.      Nose: Nose normal.   Cardiovascular:      Rate and Rhythm: Normal rate.   Pulmonary:      Effort: Pulmonary effort is normal. No respiratory distress.   Abdominal:      Tenderness: There is no abdominal tenderness. There is no right CVA tenderness or left CVA tenderness.   Genitourinary:     Penis: Normal and circumcised. No hypospadias or erythema.       Testes: Normal.   Musculoskeletal:         General: Normal range of motion.      Cervical back: Normal range of motion.   Skin:     General: Skin is warm and dry.   Neurological:      General: No focal deficit present.      Mental Status: He is alert and oriented to person, place, and time.   Psychiatric:         Mood and  "Affect: Mood normal.         Behavior: Behavior is cooperative.           LABS:      In office UA today was clear of active infection and GH.      No results found for: "PSA", "PSADIAG", "PSATOTAL", "PHIND"    Lab Results   Component Value Date    CREATININE 0.96 06/29/2023             IMPRESSION:    Encounter Diagnoses   Name Primary?    Malignant neoplasm of overlapping sites of bladder Yes       Maintenance GEMCITABINE; dose 2 of 12       Assessment:       1. Malignant neoplasm of overlapping sites of bladder        Plan:          I spent 40 minutes with the patient of which more than half was spent in direct consultation with the patient in regards to our treatment and plan.  We addressed the expectations for today's plan of care.  Reviewed the preparation:   Na Bicarb 1300mg night before then this morning.   No coffee or caffeine this morning;    We discussed their Bladder Cancer.  Discussed previous treatments and the expectations, benefits, risks with this new treatment.  Reviewed how this medication works. Possible side effects.   Discussed importance of post clean up for 6 hours after the 2 hour urination;    Diet modifications; no caffeine the morning of installation; increase water intake at the 2 hour void to flush out.  Sit to urinate; flush twice; no bleach needed.  Clean urethra after each initial urination.   No sex for 48 hours after installation; use of condom during the 6 week installations.  Graves was placed and bladder drained.   I instilled the Gemcitabine 2000mg/100ml then removed catheter.   Tolerated well   He was instructed to hold this up to 120 minutes.  I cleaned urethra and sounding tissue.  Again reviewed post installation instructions.  Recommended lifestyle modifications with proper, healthy diet, good hydration if no fluid restrictions; reducing bladder irritants.   RTC in 4 weeks for Dose 3 of 12.   Cysto with Dr. Luong 05/02/2024.     "

## 2024-01-14 ENCOUNTER — HOSPITAL ENCOUNTER (EMERGENCY)
Facility: HOSPITAL | Age: 79
Discharge: HOME | End: 2024-01-14
Attending: EMERGENCY MEDICINE
Payer: MEDICARE

## 2024-01-14 VITALS
RESPIRATION RATE: 16 BRPM | DIASTOLIC BLOOD PRESSURE: 79 MMHG | BODY MASS INDEX: 26.01 KG/M2 | HEART RATE: 75 BPM | OXYGEN SATURATION: 95 % | HEIGHT: 72 IN | TEMPERATURE: 98 F | SYSTOLIC BLOOD PRESSURE: 126 MMHG | WEIGHT: 192 LBS

## 2024-01-14 DIAGNOSIS — F10.920 ALCOHOLIC INTOXICATION WITHOUT COMPLICATION: Primary | ICD-10-CM

## 2024-01-14 DIAGNOSIS — R29.818 ACUTE FOCAL NEUROLOGICAL DEFICIT: ICD-10-CM

## 2024-01-14 LAB
ALBUMIN SERPL BCP-MCNC: 3.9 G/DL (ref 3.5–5.2)
ALP SERPL-CCNC: 91 U/L (ref 55–135)
ALT SERPL W/O P-5'-P-CCNC: 26 U/L (ref 10–44)
ANION GAP SERPL CALC-SCNC: 13 MMOL/L (ref 8–16)
AST SERPL-CCNC: 38 U/L (ref 10–40)
BASOPHILS # BLD AUTO: 0.07 K/UL (ref 0–0.2)
BASOPHILS NFR BLD: 1 % (ref 0–1.9)
BILIRUB SERPL-MCNC: 0.5 MG/DL (ref 0.1–1)
BUN SERPL-MCNC: 12 MG/DL (ref 8–23)
CALCIUM SERPL-MCNC: 9.2 MG/DL (ref 8.7–10.5)
CHLORIDE SERPL-SCNC: 105 MMOL/L (ref 95–110)
CHOLEST SERPL-MCNC: 134 MG/DL (ref 120–199)
CHOLEST/HDLC SERPL: 1.8 {RATIO} (ref 2–5)
CO2 SERPL-SCNC: 26 MMOL/L (ref 23–29)
CREAT SERPL-MCNC: 0.9 MG/DL (ref 0.5–1.4)
DIFFERENTIAL METHOD BLD: ABNORMAL
EOSINOPHIL # BLD AUTO: 0.2 K/UL (ref 0–0.5)
EOSINOPHIL NFR BLD: 2.5 % (ref 0–8)
ERYTHROCYTE [DISTWIDTH] IN BLOOD BY AUTOMATED COUNT: 13.9 % (ref 11.5–14.5)
EST. GFR  (NO RACE VARIABLE): >60 ML/MIN/1.73 M^2
ETHANOL SERPL-MCNC: 256 MG/DL
GLUCOSE SERPL-MCNC: 144 MG/DL (ref 70–110)
HCT VFR BLD AUTO: 42.6 % (ref 40–54)
HCV AB SERPL QL IA: NORMAL
HDLC SERPL-MCNC: 76 MG/DL (ref 40–75)
HDLC SERPL: 56.7 % (ref 20–50)
HGB BLD-MCNC: 14.8 G/DL (ref 14–18)
HIV 1+2 AB+HIV1 P24 AG SERPL QL IA: NORMAL
IMM GRANULOCYTES # BLD AUTO: 0.09 K/UL (ref 0–0.04)
IMM GRANULOCYTES NFR BLD AUTO: 1.3 % (ref 0–0.5)
INR PPP: 1 (ref 0.8–1.2)
LDLC SERPL CALC-MCNC: 29.2 MG/DL (ref 63–159)
LYMPHOCYTES # BLD AUTO: 1.9 K/UL (ref 1–4.8)
LYMPHOCYTES NFR BLD: 26 % (ref 18–48)
MCH RBC QN AUTO: 33.1 PG (ref 27–31)
MCHC RBC AUTO-ENTMCNC: 34.7 G/DL (ref 32–36)
MCV RBC AUTO: 95 FL (ref 82–98)
MONOCYTES # BLD AUTO: 0.4 K/UL (ref 0.3–1)
MONOCYTES NFR BLD: 5.9 % (ref 4–15)
NEUTROPHILS # BLD AUTO: 4.5 K/UL (ref 1.8–7.7)
NEUTROPHILS NFR BLD: 63.3 % (ref 38–73)
NONHDLC SERPL-MCNC: 58 MG/DL
NRBC BLD-RTO: 0 /100 WBC
PLATELET # BLD AUTO: 264 K/UL (ref 150–450)
PMV BLD AUTO: 10.1 FL (ref 9.2–12.9)
POCT GLUCOSE: 146 MG/DL (ref 70–110)
POTASSIUM SERPL-SCNC: 3.8 MMOL/L (ref 3.5–5.1)
PROT SERPL-MCNC: 7.1 G/DL (ref 6–8.4)
PROTHROMBIN TIME: 10.3 SEC (ref 9–12.5)
RBC # BLD AUTO: 4.47 M/UL (ref 4.6–6.2)
SODIUM SERPL-SCNC: 144 MMOL/L (ref 136–145)
T4 FREE SERPL-MCNC: 0.82 NG/DL (ref 0.71–1.51)
TRIGL SERPL-MCNC: 144 MG/DL (ref 30–150)
TSH SERPL DL<=0.005 MIU/L-ACNC: 0.7 UIU/ML (ref 0.4–4)
WBC # BLD AUTO: 7.16 K/UL (ref 3.9–12.7)

## 2024-01-14 PROCEDURE — 84439 ASSAY OF FREE THYROXINE: CPT | Performed by: PHYSICIAN ASSISTANT

## 2024-01-14 PROCEDURE — 82077 ASSAY SPEC XCP UR&BREATH IA: CPT

## 2024-01-14 PROCEDURE — 86803 HEPATITIS C AB TEST: CPT | Performed by: PHYSICIAN ASSISTANT

## 2024-01-14 PROCEDURE — 25500020 PHARM REV CODE 255: Performed by: EMERGENCY MEDICINE

## 2024-01-14 PROCEDURE — 85025 COMPLETE CBC W/AUTO DIFF WBC: CPT

## 2024-01-14 PROCEDURE — 80061 LIPID PANEL: CPT

## 2024-01-14 PROCEDURE — 87389 HIV-1 AG W/HIV-1&-2 AB AG IA: CPT | Performed by: PHYSICIAN ASSISTANT

## 2024-01-14 PROCEDURE — 93010 ELECTROCARDIOGRAM REPORT: CPT | Mod: ,,, | Performed by: INTERNAL MEDICINE

## 2024-01-14 PROCEDURE — 85610 PROTHROMBIN TIME: CPT

## 2024-01-14 PROCEDURE — 80053 COMPREHEN METABOLIC PANEL: CPT

## 2024-01-14 PROCEDURE — 82962 GLUCOSE BLOOD TEST: CPT

## 2024-01-14 PROCEDURE — 99285 EMERGENCY DEPT VISIT HI MDM: CPT | Mod: 25

## 2024-01-14 PROCEDURE — 93005 ELECTROCARDIOGRAM TRACING: CPT

## 2024-01-14 PROCEDURE — 25000003 PHARM REV CODE 250

## 2024-01-14 PROCEDURE — 99284 EMERGENCY DEPT VISIT MOD MDM: CPT | Mod: GC,,, | Performed by: STUDENT IN AN ORGANIZED HEALTH CARE EDUCATION/TRAINING PROGRAM

## 2024-01-14 PROCEDURE — 84443 ASSAY THYROID STIM HORMONE: CPT

## 2024-01-14 RX ORDER — ASPIRIN 325 MG
325 TABLET ORAL
Status: COMPLETED | OUTPATIENT
Start: 2024-01-14 | End: 2024-01-14

## 2024-01-14 RX ORDER — CLOPIDOGREL BISULFATE 300 MG/1
300 TABLET, FILM COATED ORAL ONCE
Status: COMPLETED | OUTPATIENT
Start: 2024-01-14 | End: 2024-01-14

## 2024-01-14 RX ADMIN — ASPIRIN 325 MG ORAL TABLET 325 MG: 325 PILL ORAL at 12:01

## 2024-01-14 RX ADMIN — CLOPIDOGREL BISULFATE 300 MG: 300 TABLET, FILM COATED ORAL at 12:01

## 2024-01-14 RX ADMIN — IOHEXOL 100 ML: 350 INJECTION, SOLUTION INTRAVENOUS at 11:01

## 2024-01-14 NOTE — CONSULTS
"[facility] - Emergency Dept  Vascular Neurology  [facility]  Consult Note    Consults  Assessment/Plan:     Patient is a 78 y.o. year old male with:    Neurological deficit present  Monroe Paul is a 78 year old male with a history of hyperlipidemia, hypertension, aortic stenosis s/p valve replacement, and bladder cancer on chemotherapy who presented to ED with "garbled speech". LKN reported 8 PM last night 1/13 when the patient noticed he did not "feel right".CTA without evidence of LVO. Pending MRI. NIHSS 3    Recommendations:  - The patient is out of the window for thrombolytic therapy  - Recommend starting DAPT: Asa 325 mg load and clopidogrel 300 mg load  - Maintain BP< 220  - If neurological symptoms improve and return in a stuttering fashion, please place head of bed flat and reassess with neuro exam   - Will f/u MRI and determine next steps for disposition; care update to follow        STROKE DOCUMENTATION     Acute Stroke Times   Last Known Normal Date: 01/13/24  Last Known Normal Time: 2000  Symptom Onset Date: 01/13/24  Symptom Onset Time: 2000  Stroke Team Called Date: 01/14/24  Stroke Team Called Time: 1045  Stroke Team Arrival Date: 01/14/24  Stroke Team Arrival Time: 1050  CT Interpretation Time: 1058  Thrombolytic Therapy Recommended: No  CTA Interpretation Time: 1105  MRI Acute Stroke Protocol Interpretation Time: 1335    NIH Scale:  1a. Level of Consciousness: 0-->Alert, keenly responsive  1b. LOC Questions: 0-->Answers both questions correctly  1c. LOC Commands: 0-->Performs both tasks correctly  2. Best Gaze: 0-->Normal  3. Visual: 0-->No visual loss  4. Facial Palsy: 1-->Minor paralysis (flattened nasolabial fold, asymmetry on smiling)  5a. Motor Arm, Left: 0-->No drift, limb holds 90 (or 45) degrees for full 10 secs  5b. Motor Arm, Right: 0-->No drift, limb holds 90 (or 45) degrees for full 10 secs  6a. Motor Leg, Left: 0-->No drift, leg holds 30 degree position for full 5 " "secs  6b. Motor Leg, Right: 0-->No drift, leg holds 30 degree position for full 5 secs  7. Limb Ataxia: 1-->Present in one limb  8. Sensory: 0-->Normal, no sensory loss  9. Best Language: 1-->Mild-to-moderate aphasia, some obvious loss of fluency or facility of comprehension, without significant limitation on ideas expressed or form of expression. Reduction of speech and/or comprehension, however, makes conversation. . . (see row details)  10. Dysarthria: 0-->Normal  11. Extinction and Inattention (formerly Neglect): 0-->No abnormality  Total (NIH Stroke Scale): 3    Modified Karthaus Score: 1  Austin Coma Scale:    ABCD2 Score:    [id number] Score:   HAS -BLED Score:   ICH Score:   Hunt & Beckman Classification:       Thrombolysis Candidate? No, Out of window - Symptom onset > 4.5 hours    Delays to Thrombolysis?  Not Applicable    Interventional Revascularization Candidate?   Is the patient eligible for mechanical endovascular reperfusion (SUSAN)?  No; no significant neurologic deficit (NIHSS <6)     Delays to Thrombectomy? No    Hemorrhagic change of an Ischemic Stroke: Does this patient have an ischemic stroke with hemorrhagic changes? No     Subjective:     History of Present Illness:  Monroe Paul is a 78 year old male with a history of hyperlipidemia, hypertension, aortic stenosis s/p valve replacement, and bladder cancer on chemotherapy who presented to ED with "garbled speech". LKN reported 8 PM last night 1/13 when the patient noticed he did not "feel right". This morning he continued to have trouble with speech and acting strange from his baseline. The stroke team was notified at 10:45 AM. The patient is not currently taking any blood thinners. He is adherent with his medications. CTA without evidence of LVO. There is bilateral carotid calcification with patent vessels. There is generalized cerebral volume loss. No definitive ischemic foci on CT. Pending MRI.           Past Medical History:   Diagnosis Date "    Kidney stone      Past Surgical History:   Procedure Laterality Date    BLADDER SURGERY      HERNIA REPAIR       Social History     Tobacco Use    Smoking status: Never    Smokeless tobacco: Never   Substance Use Topics    Alcohol use: Yes    Drug use: Never     Review of patient's allergies indicates:  No Known Allergies    Medications: I have reviewed the current medication administration record.    (Not in a hospital admission)      Review of Systems   Constitutional:  Positive for activity change. Negative for chills and fever.   HENT:  Negative for rhinorrhea and sneezing.    Respiratory:  Negative for cough and shortness of breath.    Cardiovascular:  Negative for chest pain and leg swelling.   Gastrointestinal:  Negative for abdominal pain and nausea.   Genitourinary:  Negative for dysuria and hematuria.   Musculoskeletal:  Negative for arthralgias and myalgias.   Neurological:  Positive for facial asymmetry. Negative for tremors and headaches.   Psychiatric/Behavioral:  Positive for decreased concentration. Negative for agitation and confusion.      Objective:     Vital Signs (Most Recent):  Temp: 97.9 °F (36.6 °C) (01/14/24 1040)  Pulse: 81 (01/14/24 1150)  Resp: 18 (01/14/24 1040)  BP: (!) 118/58 (01/14/24 1150)  SpO2: 95 % (01/14/24 1135)    Vital Signs Range (Last 24H):  Temp:  [97.9 °F (36.6 °C)]   Pulse:  [81-82]   Resp:  [18]   BP: (118-138)/(58-66)   SpO2:  [95 %-99 %]        Physical Exam  Eyes:      Pupils: Pupils are equal, round, and reactive to light.   Cardiovascular:      Rate and Rhythm: Normal rate.   Pulmonary:      Effort: Pulmonary effort is normal.   Abdominal:      General: Abdomen is flat.   Skin:     General: Skin is warm.   Neurological:      Mental Status: He is alert.              Neurological Exam:   LOC: alert  Attention Span: intermittently attention will wane   Language: Naming impaired  Articulation: No dysarthria  Orientation: Person, Place, Time   Visual Fields: Full  EOM  "(CN III, IV, VI): Full/intact  Facial Sensation (CN V): Normal  Facial Movement (CN VII): Lower facial weakness on the Left  Motor: 5/5 throughout  Cerebellum: Upper Extremity Appendicular Ataxia (Finger Nose Finger)  Left  Sensation: Intact to light touch, temperature and vibration  Tone: Normal tone throughout      Laboratory:  CMP:   Recent Labs   Lab 01/14/24  1129   CALCIUM 9.2   ALBUMIN 3.9   PROT 7.1      K 3.8   CO2 26      BUN 12   CREATININE 0.9   ALKPHOS 91   ALT 26   AST 38   BILITOT 0.5     CBC: No results for input(s): "WBC", "RBC", "HGB", "HCT", "PLT", "MCV", "MCH", "MCHC" in the last 168 hours.    Diagnostic Results:      Brain imaging:  Pending MRI    Vessel Imaging:  CTA without LVO, there is bilateral calcification and generalized cerebral volume loss    Cardiac Evaluation:   Will consider echo if indicated       Jethro Oden MD  [facility]  Department of Vascular Neurology   [facility] - Emergency Dept   "

## 2024-01-14 NOTE — ED TRIAGE NOTES
Monroe Paul, a 78 y.o. male presents to the ED w/ complaint of possible stroke. Pts family states that she talked to pt on phone at appx 2000 yesterday evening and noticed his speech was slurred. Pt recognized at appx 0400 this morning he was not feeling himself and came to ED.     Triage note:  Chief Complaint   Patient presents with    Multiple complaints     Wife reports last night talked to him on phone and speech was garbled, drove from [place] , wife reports speech is more garbled     Review of patient's allergies indicates:  No Known Allergies  Past Medical History:   Diagnosis Date    Kidney stone

## 2024-01-14 NOTE — DISCHARGE INSTRUCTIONS
Thank you for coming to our Emergency Department today. It is important to remember that some problems or medical conditions are difficult to diagnose and may not be found or addressed during your Emergency Department visit.     Be sure to follow up with your primary care doctor and review all labs/imaging/tests that were performed during your ER visit with them. Some labs/imaging/tests may be outside of the normal range, and require non-emergent follow-up and/or further investigation/treatment/procedures/testing to help diagnose/exclude/prevent complications or other potentially serious medical conditions that were not discussed or addressed during your ER visit.    If you do not have a primary care doctor, you may contact the one listed on your discharge paperwork or you may also call the [facility] Appointment Desk at [phone] to schedule an appointment and establish care with one. It is important to your health that you have a primary care doctor.    Please take all medications as directed. All medications may potentially have side-effects and it is impossible to predict which medications may give you side-effects or what side-effects (if any) they will give you.. If you feel that you are having a negative effect or side-effect of any medication you should immediately stop taking them and seek medical attention. If you feel that you are having a life-threatening reaction call 911.    Return to the ER with any questions/concerns, new/concerning symptoms, worsening or failure to improve.     Do not drive, swim, climb to height, take a bath, operate heavy machinery, drink alcohol or take potentially sedating medications, sign any legal documents or make any important decisions for 24 hours if you have received any pain medications, sedatives or mood altering drugs during your ER visit or within 24 hours of taking them if they have been prescribed to you.     You can find additional resources for Dentists,  hearing aids, durable medical equipment, low cost pharmacies and other resources at [email or web address]    BELOW THIS LINE ONLY APPLIES IF YOU HAVE A COVID TEST PENDING OR IF YOU HAVE BEEN DIAGNOSED WITH COVID:  Please access [facility] to review the results of your test. Until the results of your COVID test return, you should isolate yourself so as not to potentially spread illness to others.   If your COVID test returns positive, you should isolate yourself so as not to spread illness to others. After five full days, if you are feeling better and you have not had fever for 24 hours, you can return to your typical daily activities, but you must wear a mask around others for an additional 5 days.   If your COVID test returns negative and you are either unvaccinated or more than six months out from your two-dose vaccine and are not yet boosted, you should still quarantine for 5 full days followed by strict mask use for an additional 5 full days.   If your COVID test returns negative and you have received your 2-dose initial vaccine as well as a booster, you should continue strict mask use for 10 full days after the exposure.  For all those exposed, best practice includes a test at day 5 after the exposure. This can be a home test or a test through one of the many testing centers throughout our community.   Masking is always advised to limit the spread of COVID. Cdc.gov is an excellent site to obtain the latest up to date recommendations regarding COVID and COVID testing.     CDC Testing and Quarantine Guidelines for patients with exposure to a known-positive COVID-19 person:  A close exposure is defined as anyone who has had an exposure (masked or unmasked) to a known COVID -19 positive person within 6 feet of someone for a cumulative total of 15 minutes or more over a 24-hour period.   Vaccinated and/or if you recently had a positive covid test within 90 days do NOT need to quarantine after contact with someone  who had COVID-19 unless you develop symptoms.   Fully vaccinated people who have not had a positive test within 90 days, should get tested 3-5 days after their exposure, even if they don't have symptoms and wear a mask indoors in public for 14 days following exposure or until their test result is negative.      Unvaccinated and/or NOT had a positive test within 90 days and meet close exposure  You are required by CDC guidelines to quarantine for at least 5 days from time of exposure followed by 5 days of strict masking. It is recommended, but not required to test after 5 days, unless you develop symptoms, in which case you should test at that time.  If you get tested after 5 days and your test is positive, your 5 day period of isolation starts the day of the positive test.    If your exposure does not meet the above definition, you can return to your normal daily activities to include social distancing, wearing a mask and frequent handwashing.      Here is a link to guidance from the CDC:  https://www.cdc.gov/media/releases/2021/s1227-isolation-quarantine-guidance.html      [facility] Testing Sites:  https://ldh.la.gov/page/3934      [facility] website with testing locations and guidance:  [email or web address]

## 2024-01-14 NOTE — CARE UPDATE
Update:  No findings of acute or subacute infarct on MRI. Last NIHSS was 3 for mild facial droop, ataxia, and mild aphasia. CTA without LVO. There is bilateral carotid calcification and generalized cerebral volume loss.     - Recommend continued monitoring in the ED  - Obtain blood alcohol level  - Urine toxicology   - Consider supplementing with PO folate and thiamine   - No additional workup from stroke standpoint and no indication for admission to service  - Nonetheless, if there are interval changes on exam please reach back

## 2024-01-14 NOTE — ED PROVIDER NOTES
Encounter Date: 1/14/2024       History     Chief Complaint   Patient presents with    Multiple complaints     Wife reports last night talked to him on phone and speech was garbled, drove from [place] , wife reports speech is more garbled     78-year-old male with a past medical history of nephrolithiasis and alcohol use presents with garbled speech.  The patient reportedly drove from [place] this morning with his wife in the vehicle.  She was saying that his garbled speech has gotten worse.  The patient is denying any other focal neurologic deficit.    The history is provided by the patient and the spouse. No  was used.     Review of patient's allergies indicates:  No Known Allergies  Past Medical History:   Diagnosis Date    Kidney stone      Past Surgical History:   Procedure Laterality Date    BLADDER SURGERY      HERNIA REPAIR       History reviewed. No pertinent family history.  Social History     Tobacco Use    Smoking status: Never    Smokeless tobacco: Never   Substance Use Topics    Alcohol use: Yes    Drug use: Never     Review of Systems    Physical Exam     Initial Vitals [01/14/24 1040]   BP Pulse Resp Temp SpO2   138/66 82 18 97.9 °F (36.6 °C) 99 %      MAP       --         Physical Exam    Nursing note and vitals reviewed.  Constitutional: He appears well-developed and well-nourished. He is not diaphoretic. No distress.   HENT:   Head: Normocephalic and atraumatic.   Eyes: EOM are normal. Pupils are equal, round, and reactive to light. Right eye exhibits no discharge. Left eye exhibits no discharge.   Neck: Neck supple.   Cardiovascular:  Normal rate, regular rhythm, normal heart sounds and intact distal pulses.           Pulmonary/Chest: Breath sounds normal. He has no wheezes. He has no rhonchi. He has no rales.   Abdominal: Abdomen is soft. There is no abdominal tenderness. There is no rebound and no guarding.   Musculoskeletal:         General: No tenderness or edema.  Normal range of motion.      Cervical back: Neck supple.     Neurological: He is alert and oriented to person, place, and time. He has normal strength. No cranial nerve deficit.   Cranial nerves 2-12 grossly intact, there is no pronator drift, strength and sensation equal and preserved in the upper and lower extremities   Skin: Skin is warm and dry. No rash noted. No erythema. No pallor.   Psychiatric: He has a normal mood and affect. His behavior is normal. Judgment and thought content normal.         ED Course   Procedures  Labs Reviewed   CBC W/ AUTO DIFFERENTIAL - Abnormal; Notable for the following components:       Result Value    RBC 4.47 (*)     MCH 33.1 (*)     Immature Granulocytes 1.3 (*)     Immature Grans (Abs) 0.09 (*)     All other components within normal limits    Narrative:     Release to patient->Immediate   COMPREHENSIVE METABOLIC PANEL - Abnormal; Notable for the following components:    Glucose 144 (*)     All other components within normal limits    Narrative:     Release to patient->Immediate   LIPID PANEL - Abnormal; Notable for the following components:    HDL 76 (*)     LDL Cholesterol 29.2 (*)     HDL/Cholesterol Ratio 56.7 (*)     Total Cholesterol/HDL Ratio 1.8 (*)     All other components within normal limits    Narrative:     Release to patient->Immediate   ALCOHOL,MEDICAL (ETHANOL) - Abnormal; Notable for the following components:    Alcohol, Serum 256 (*)     All other components within normal limits   POCT GLUCOSE - Abnormal; Notable for the following components:    POCT Glucose 146 (*)     All other components within normal limits   HIV 1 / 2 ANTIBODY    Narrative:     Release to patient->Immediate   HEPATITIS C ANTIBODY    Narrative:     Release to patient->Immediate   PROTIME-INR    Narrative:     Release to patient->Immediate   TSH    Narrative:     Release to patient->Immediate   T4, FREE    Narrative:     Release to patient->Immediate          Imaging Results              MRI  Brain Without Contrast (Final result)  Result time 01/14/24 13:45:05      Final result by Reji Pool MD (01/14/24 13:45:05)                   Impression:      No acute intracranial findings.  No evidence of acute ischemia or recent infarction, as questioned.      Electronically signed by: Reji Pool  Date:    01/14/2024  Time:    13:45               Narrative:    EXAMINATION:  MRI BRAIN WITHOUT CONTRAST    CLINICAL HISTORY:  Stroke, follow up;    TECHNIQUE:  Multiplanar multisequence MR imaging of the brain was performed without contrast.    COMPARISON:  CTA head neck performed 01/14/2024    FINDINGS:  Parenchyma: There is no restricted diffusion to suggest acute or subacute ischemic infarct.Generalized pattern age-related parenchymal volume loss.  Nonspecific areas of T2/FLAIR hyperintense signal in the frontoparietal white matter would be in keeping with sequela of chronic small vessel ischemic disease.Tiny remote infarcts in the right cerebellum.    Additional comments: There is no midline shift, abnormal extra-axial fluid collection, or acute intracranial hemorrhage. The basal cisterns are patent.    Ventricles: Normal.    Flow voids: The normal major intracranial arterial flow voids are visualized.    Sinuses and mastoid air cells: Scattered modest paranasal sinus mucosal thickening with possible small retention cysts.  Nonspecific partial fluid signal opacification of the right mastoid air cells, possibly the basis of effusion and/or mucosal thickening.    Orbits: Normal    Midline structures: The pituitary and craniocervical junction are normal.    Marrow: Normal                                         CTA STROKE MULTI-PHASE (Final result)  Result time 01/14/24 11:33:01      Final result by Reji Pool MD (01/14/24 11:33:01)                   Impression:      1. No definite acute transcortical infarct by CT.  No evidence of acute intracranial hemorrhage, mass effect, midline shift.   Question technically age indeterminate small lacunar type infarcts in the deep gray nuclei as well as possibly the janusz.  Clinical correlation recommended.  MRI could provide improved sensitivity and specificity for assessment of ischemia, exam and could be performed as clinically warranted.  2. No definite evidence of acute large vessel occlusion or flow-limiting stenosis.  3. Short segment fusiform dilatation of the distal cervical right ICA.  No definite evidence of dissection flap/intimal injury.  4. Additional details, as provided in the body of the report.      Electronically signed by: Reji Pool  Date:    01/14/2024  Time:    11:33               Narrative:    EXAMINATION:  CTA STROKE MULTI-PHASE    CLINICAL HISTORY:  Neuro deficit, acute, stroke suspected;    TECHNIQUE:  Non contrast low dose axial images were obtained thought the head. CT angiogram was performed from the level of the jennifer to the top of the head following the IV administration of 100mL of Omnipaque 350.   Sagittal and coronal reconstructions and maximum intensity projection reconstructions were performed. Arterial stenosis percentages are based on NASCET measurement criteria.  Two additional phases of immediate post-contrast CTA images were performed through the head alone.    COMPARISON:  None    FINDINGS:  CT HEAD:    Blood: No acute intracranial hemorrhage.    Parenchyma: No definite loss of gray-white differentiation to suggest acute or subacute transcortical infarct. Generalized pattern age-related parenchymal volume loss.  Nonspecific areas of white matter hypoattenuation could relate to sequela of chronic small vessel ischemic disease.  Questionable small age-indeterminate lacunar type infarcts in the deep gray nuclei and possibly the janusz.    Ventricles/Extra-axial spaces: No abnormal extra-axial fluid collection. Basal cisterns are patent.    Vessels: Mild-to-moderate atherosclerotic calcifications.    Orbits:  Unremarkable.    Scalp: Unremarkable.    Skull: There are no depressed skull fractures or destructive bone lesions.    Sinuses and mastoids: Scattered relatively modest paranasal sinus mucosal thickening with retention cysts.  Borderline enlargement of the incisive canal, which may be seen in the setting naso palatine duct cyst.    Other findings: None    CTA:    Comment: Study compromised by suboptimal contrast bolus timing.    NECK:    Imaged aortic arch: Nonaneurysmal.  Left-sided arch.  Conventional 3 vessel arch anatomy.  Moderate atherosclerosis.  Brachiocephalic and subclavian arteries appear grossly patent.    Right common and cervical internal carotid arteries: CCA and ECA grossly patent.  No definite flow-limiting stenosis of the proximal ICA.  Tortuosity of the distal cervical ICA at the skull base with short segment fusiform dilatation measuring up to 8 mm.  No definite intimal irregularity appreciated.    Left common and cervical internal carotid arteries: CCA and ECA grossly patent less than 50% atheromatous narrowing at the proximal ICA.  No evidence of carotid dissection or web.    Right cervical vertebral artery: Mild atheromatous narrowing at the origin.No definite hemodynamically significant stenosis or dissection elsewhere.    Left cervical vertebral artery: Mild atheromatous narrowing at the origin.No definite hemodynamically significant stenosis or dissection elsewhere..    HEAD:    Right anterior circulation:Focal short segment moderate luminal narrowing of the supraclinoid ICA.  Relatively mild atheromatous irregularity of the ICA elsewhere.  No definite flow-limiting stenosis of MCA or ISATU branches.  Right ophthalmic artery is patent.    Left anterior circulation: Mild atheromatous irregularity of the ICA.Left ophthalmic artery is patent.    Posterior circulation: There is no hemodynamically significant vertebrobasilar stenosis. There is no significant PCA stenosis. Posterior inferior  cerebellar arteries patent at origin.    Anterior and posterior communicating arteries: An anterior communicating artery appears patent.  Posterior communicating arteries are not reliably visualized.    NON-ANGIOGRAPHIC FINDINGS:    Visualized intracranial contents: As above.    Soft tissues of the neck: Nonspecific punctate calcifications in the parotid glands, may reflect sequela of prior inflammation.    Visualized sinuses: As above.    Dentition: Unremarkable.    Spine: Relatively modest degenerative change.    Visualized lungs: Status post CABG.  No definite acute abnormality in the visualized upper lungs.                                       Medications   iohexoL (OMNIPAQUE 350) injection 100 mL (100 mLs Intravenous Given 1/14/24 1102)   aspirin tablet 325 mg (325 mg Oral Given 1/14/24 1228)   clopidogreL tablet 300 mg (300 mg Oral Given 1/14/24 1228)     Medical Decision Making  See ED course for remainder of care    Amount and/or Complexity of Data Reviewed  Labs: ordered.  Radiology: ordered.  ECG/medicine tests:  Decision-making details documented in ED Course.    Risk  OTC drugs.  Prescription drug management.              Attending Attestation:   Physician Attestation Statement for Resident:  As the supervising MD   Physician Attestation Statement: I have personally seen and examined this patient.   I agree with the above history.  -:   As the supervising MD I agree with the above PE.     As the supervising MD I agree with the above treatment, course, plan, and disposition.   -: [place] us evaluate his slurred speech and ataxia; stroke workup negative. Alcohol level markedly elevated.  Wife then told us how he has been hiding his alcohol dependence from her as well.  OK to d/c, recommend outpatient detox, wife will be driving back.                     ED Course as of 01/14/24 1658   Sun Jan 14, 2024   1655 78-year-old male with normal blood glucose.  Code stroke was called  from triage patient was taken emergently to CT scan.  Vascular neurology recommended aspirin and Plavix load while waiting for MRI.  I added ethanol and urine tox screen.  MRI was negative for acute stroke.  Ethanol level was 256.  We discussed this with the patient and his wife were at bedside.  His wife said that they were on a cruise recently a couple of weeks ago and said that he had been hiding bottles of alcohol from her.  She thought that he would stopped drinking.  I discussed the importance of seeking medical detox given the patient's chronic use.  The wife said that she will drive them home to [place].  I answered all questions, provided discharge paperwork and the patient was discharged in stable condition into the care of his wife.   [BP]   1658 ECG 12 lead  Sinus rhythm at 82 beats per minute, all intervals within normal limits, no STEMI [BP]      ED Course User Index  [BP] Houston Felipe MD                           Clinical Impression:  Final diagnoses:  [R29.818] Acute focal neurological deficit  [F10.920] Alcoholic intoxication without complication (Primary)          ED Disposition Condition    Discharge Stable          ED Prescriptions    None       Follow-up Information       Follow up With Specialties Details Why Contact Info    [facility] - Emergency Dept Emergency Medicine  As needed, If symptoms worsen [address]  [facility] [id number] [phone]             Houston Felipe MD  Resident  01/14/24 1377       Jethro Balbuena MD  01/15/24 9808

## 2024-01-14 NOTE — HPI
"Monroe Paul is a 78 year old male with a history of hyperlipidemia, hypertension, aortic stenosis s/p valve replacement, and bladder cancer on chemotherapy who presented to ED with "garbled speech". LKN reported 8 PM last night 1/13 when the patient noticed he did not "feel right". This morning he continued to have trouble with speech and acting strange from his baseline. The stroke team was notified at 10:45 AM. The patient is not currently taking any blood thinners. He is adherent with his medications. CTA without evidence of LVO. There is bilateral carotid calcification with patent vessels. There is generalized cerebral volume loss. No definitive ischemic foci on CT. Pending MRI.   "

## 2024-01-14 NOTE — ASSESSMENT & PLAN NOTE
"Monroe Paul is a 78 year old male with a history of hyperlipidemia, hypertension, aortic stenosis s/p valve replacement, and bladder cancer on chemotherapy who presented to ED with "garbled speech". LKN reported 8 PM last night 1/13 when the patient noticed he did not "feel right".CTA without evidence of LVO. Pending MRI. NIHSS 3    Recommendations:  - The patient is out of the window for thrombolytic therapy  - Recommend starting DAPT: Asa 325 mg load and clopidogrel 300 mg load  - Maintain BP< 220  - If neurological symptoms improve and return in a stuttering fashion, please place head of bed flat and reassess with neuro exam   - Will f/u MRI and determine next steps for disposition; care update to follow  "

## 2024-01-14 NOTE — SUBJECTIVE & OBJECTIVE
Past Medical History:   Diagnosis Date    Kidney stone      Past Surgical History:   Procedure Laterality Date    BLADDER SURGERY      HERNIA REPAIR       Social History     Tobacco Use    Smoking status: Never    Smokeless tobacco: Never   Substance Use Topics    Alcohol use: Yes    Drug use: Never     Review of patient's allergies indicates:  No Known Allergies    Medications: I have reviewed the current medication administration record.    (Not in a hospital admission)      Review of Systems   Constitutional:  Positive for activity change. Negative for chills and fever.   HENT:  Negative for rhinorrhea and sneezing.    Respiratory:  Negative for cough and shortness of breath.    Cardiovascular:  Negative for chest pain and leg swelling.   Gastrointestinal:  Negative for abdominal pain and nausea.   Genitourinary:  Negative for dysuria and hematuria.   Musculoskeletal:  Negative for arthralgias and myalgias.   Neurological:  Positive for facial asymmetry. Negative for tremors and headaches.   Psychiatric/Behavioral:  Positive for decreased concentration. Negative for agitation and confusion.      Objective:     Vital Signs (Most Recent):  Temp: 97.9 °F (36.6 °C) (01/14/24 1040)  Pulse: 81 (01/14/24 1150)  Resp: 18 (01/14/24 1040)  BP: (!) 118/58 (01/14/24 1150)  SpO2: 95 % (01/14/24 1135)    Vital Signs Range (Last 24H):  Temp:  [97.9 °F (36.6 °C)]   Pulse:  [81-82]   Resp:  [18]   BP: (118-138)/(58-66)   SpO2:  [95 %-99 %]        Physical Exam  Eyes:      Pupils: Pupils are equal, round, and reactive to light.   Cardiovascular:      Rate and Rhythm: Normal rate.   Pulmonary:      Effort: Pulmonary effort is normal.   Abdominal:      General: Abdomen is flat.   Skin:     General: Skin is warm.   Neurological:      Mental Status: He is alert.              Neurological Exam:   LOC: alert  Attention Span: intermittently attention will wane   Language: Naming impaired  Articulation: No dysarthria  Orientation:  "Person, Place, Time   Visual Fields: Full  EOM (CN III, IV, VI): Full/intact  Facial Sensation (CN V): Normal  Facial Movement (CN VII): Lower facial weakness on the Left  Motor: 5/5 throughout  Cerebellum: Upper Extremity Appendicular Ataxia (Finger Nose Finger)  Left  Sensation: Intact to light touch, temperature and vibration  Tone: Normal tone throughout      Laboratory:  CMP:   Recent Labs   Lab 01/14/24  1129   CALCIUM 9.2   ALBUMIN 3.9   PROT 7.1      K 3.8   CO2 26      BUN 12   CREATININE 0.9   ALKPHOS 91   ALT 26   AST 38   BILITOT 0.5     CBC: No results for input(s): "WBC", "RBC", "HGB", "HCT", "PLT", "MCV", "MCH", "MCHC" in the last 168 hours.    Diagnostic Results:      Brain imaging:  Pending MRI    Vessel Imaging:  CTA without LVO, there is bilateral calcification and generalized cerebral volume loss    Cardiac Evaluation:   Will consider echo if indicated     "

## 2024-01-18 ENCOUNTER — OFFICE VISIT (OUTPATIENT)
Dept: UROLOGY | Facility: CLINIC | Age: 79
End: 2024-01-18
Payer: MEDICARE

## 2024-01-18 VITALS
WEIGHT: 191.13 LBS | BODY MASS INDEX: 25.89 KG/M2 | DIASTOLIC BLOOD PRESSURE: 72 MMHG | HEIGHT: 72 IN | HEART RATE: 79 BPM | SYSTOLIC BLOOD PRESSURE: 145 MMHG

## 2024-01-18 DIAGNOSIS — C67.8 MALIGNANT NEOPLASM OF OVERLAPPING SITES OF BLADDER: Primary | ICD-10-CM

## 2024-01-18 DIAGNOSIS — Z85.51 ENCOUNTER FOR FOLLOW-UP SURVEILLANCE OF BLADDER CANCER: ICD-10-CM

## 2024-01-18 DIAGNOSIS — Z08 ENCOUNTER FOR FOLLOW-UP SURVEILLANCE OF BLADDER CANCER: ICD-10-CM

## 2024-01-18 LAB
BILIRUB SERPL-MCNC: NORMAL MG/DL
BLOOD URINE, POC: NORMAL
CLARITY, POC UA: CLEAR
COLOR, POC UA: NORMAL
GLUCOSE UR QL STRIP: NORMAL
KETONES UR QL STRIP: NORMAL
LEUKOCYTE ESTERASE URINE, POC: NORMAL
NITRITE, POC UA: NORMAL
PH, POC UA: 5
PROTEIN, POC: NORMAL
SPECIFIC GRAVITY, POC UA: 1.02
UROBILINOGEN, POC UA: NORMAL

## 2024-01-18 PROCEDURE — 51720 TREATMENT OF BLADDER LESION: CPT | Mod: S$PBB,,, | Performed by: NURSE PRACTITIONER

## 2024-01-18 PROCEDURE — 51720 TREATMENT OF BLADDER LESION: CPT | Mod: PBBFAC | Performed by: NURSE PRACTITIONER

## 2024-01-18 PROCEDURE — 99499 UNLISTED E&M SERVICE: CPT | Mod: S$PBB,,, | Performed by: NURSE PRACTITIONER

## 2024-01-18 PROCEDURE — 99999 PR PBB SHADOW E&M-EST. PATIENT-LVL IV: CPT | Mod: PBBFAC,,, | Performed by: NURSE PRACTITIONER

## 2024-01-18 PROCEDURE — 99999PBSHW PR PBB SHADOW TECHNICAL ONLY FILED TO HB: Mod: PBBFAC,,,

## 2024-01-18 PROCEDURE — 99214 OFFICE O/P EST MOD 30 MIN: CPT | Mod: PBBFAC | Performed by: NURSE PRACTITIONER

## 2024-01-18 PROCEDURE — 81002 URINALYSIS NONAUTO W/O SCOPE: CPT | Mod: PBBFAC | Performed by: NURSE PRACTITIONER

## 2024-01-18 PROCEDURE — 99999PBSHW POCT URINE DIPSTICK WITHOUT MICROSCOPE: Mod: PBBFAC,,,

## 2024-01-18 RX ORDER — CELECOXIB 200 MG/1
200 CAPSULE ORAL EVERY MORNING
COMMUNITY
Start: 2023-11-17

## 2024-01-18 RX ORDER — OXYCODONE HYDROCHLORIDE AND ACETAMINOPHEN 5; 325 MG/1; MG/1
1 TABLET ORAL EVERY 6 HOURS PRN
COMMUNITY
Start: 2023-10-24 | End: 2024-06-04

## 2024-01-18 RX ORDER — ASPIRIN 325 MG/1
1 TABLET, FILM COATED ORAL EVERY MORNING
COMMUNITY
Start: 2024-01-15

## 2024-01-18 RX ORDER — LANOLIN ALCOHOL/MO/W.PET/CERES
100 CREAM (GRAM) TOPICAL EVERY MORNING
COMMUNITY
Start: 2024-01-15

## 2024-01-18 RX ORDER — SERTRALINE HYDROCHLORIDE 50 MG/1
1 TABLET, FILM COATED ORAL EVERY MORNING
COMMUNITY
Start: 2024-01-15 | End: 2025-01-14

## 2024-01-18 RX ORDER — UBIDECARENONE 75 MG
1 CAPSULE ORAL EVERY MORNING
COMMUNITY
Start: 2024-01-15 | End: 2025-01-14

## 2024-01-18 RX ADMIN — GEMCITABINE 2000 MG: 38 INJECTION, SOLUTION INTRAVENOUS at 09:01

## 2024-01-18 NOTE — PROGRESS NOTES
CHIEF COMPLAINT:    Monroe Paul is a 78 y.o. male presents today for Bladder Cancer    HISTORY OF PRESENTING ILLINESS:    Monroe Paul is a 78 y.o. male who is an established patient in our clinic.   He is newly diagnosed with NMI Bladder Cancer.   He had original TURBT in [place] and then went on to have repeat resection at [facility].   He was seen in clinic 07/17/2023 with Dr. Luong     09/21/2023 he completed Induction GEMCITABINE x 6 doses.   11/02/2023 (-) cysto with Dr. Luong.      11/20/2023 he began Maintenance Gemcitabine; monthly x 12 doses. .      Here today for Maintenance Gemcitabine; dose 3 of 12.     Reports he was able to hold it longer before initial urge to urinate, < 1 hour  Did not take his Na Bicarb last month, but did take today.   Last night only got up once to urinate.   No dysuria/hematuria.        Urologic History:      6/9/2023 -- TURBT @ [place] -- HG Ta  6/29/2023 -- CT Urogram -- no nodes, no renal masses, no ureteral filling defects  6/30/2023 -- TURBT @ [facility] -- no tumor  08/08/2023-09/21/2023 -- Completed Induction Gemcitabine.   11/02/2023 (-) cysto with Dr. Luong.   11/20/2023 he began Maintenance Gemcitabine x 12 months              REVIEW OF SYSTEMS:  Review of Systems   Constitutional: Negative.  Negative for chills and fever.   Eyes:  Negative for double vision.   Respiratory:  Negative for cough and shortness of breath.    Cardiovascular:  Negative for chest pain.   Gastrointestinal:  Negative for abdominal pain, constipation, diarrhea, nausea and vomiting.   Genitourinary:  Positive for frequency (daytime). Negative for dysuria, flank pain and hematuria.        Ok with urination  Nocturia x 1     Neurological:  Negative for dizziness and seizures.   Endo/Heme/Allergies:  Negative for polydipsia.         PATIENT HISTORY:    Past Medical History:   Diagnosis Date    Kidney stone        Past Surgical History:   Procedure Laterality Date    BLADDER SURGERY       HERNIA REPAIR         History reviewed. No pertinent family history.    Social History     Socioeconomic History    Marital status:    Tobacco Use    Smoking status: Never    Smokeless tobacco: Never   Substance and Sexual Activity    Alcohol use: Yes    Drug use: Never    Sexual activity: Not Currently       Allergies:  Patient has no known allergies.    Medications:    Current Outpatient Medications:     celecoxib (CELEBREX) 200 MG capsule, Take 200 mg by mouth every morning., Disp: , Rfl:     cholecalciferol, vitamin D3, (VITAMIN D3) 25 mcg (1,000 unit) capsule, Take 5,000 Units by mouth., Disp: , Rfl:     cyanocobalamin 500 MCG tablet, Take 1 tablet by mouth every morning., Disp: , Rfl:     denosumab (PROLIA) 60 mg/mL Syrg, Inject 60 mg into the skin., Disp: , Rfl:     eszopiclone (LUNESTA) 3 mg Tab, Take 3 mg by mouth., Disp: , Rfl:     evolocumab (REPATHA SURECLICK) 140 mg/mL PnIj, Inject 140 mg into the skin., Disp: , Rfl:     ezetimibe (ZETIA) 10 mg tablet, Take 10 mg by mouth., Disp: , Rfl:     gabapentin (NEURONTIN) 300 MG capsule, Take by mouth., Disp: , Rfl:     hydroCHLOROthiazide (HYDRODIURIL) 50 MG tablet, Take 50 mg by mouth., Disp: , Rfl:     hyoscyamine (LEVSIN) 0.125 mg TbDL, Take by mouth., Disp: , Rfl:     hyoscyamine (LEVSIN) 0.125 mg TbDL, Take 0.125 mg by mouth., Disp: , Rfl:     imiquimod (ALDARA) 5 % cream, Apply 1 packet topically., Disp: , Rfl:     linaCLOtide (LINZESS) 290 mcg Cap capsule, Take 290 mcg by mouth., Disp: , Rfl:     LINZESS 290 mcg Cap capsule, Take 290 mcg by mouth., Disp: , Rfl:     LINZESS 72 mcg Cap capsule, Take 72 mcg by mouth every morning., Disp: , Rfl:     metoprolol succinate (TOPROL-XL) 25 MG 24 hr tablet, Take 25 mg by mouth., Disp: , Rfl:     metoprolol tartrate (LOPRESSOR) 50 MG tablet, Take as directed the night before and 1 hour prior to CT., Disp: , Rfl:     metroNIDAZOLE (METROGEL) 0.75 % gel, Apply 1 application  topically., Disp: , Rfl:      omega-3 fatty acids/fish oil (FISH OIL-OMEGA-3 FATTY ACIDS) 300-1,000 mg capsule, Take 2 capsules by mouth every evening., Disp: , Rfl:     oxyCODONE (ROXICODONE) 5 MG immediate release tablet, Take by mouth., Disp: , Rfl:     PERCOCET 5-325 mg per tablet, Take 1 tablet by mouth every 6 (six) hours as needed., Disp: , Rfl:     potassium chloride (KLOR-CON) 10 MEQ TbSR, Take 10 mEq by mouth., Disp: , Rfl:     rosuvastatin (CRESTOR) 40 MG Tab, Take 40 mg by mouth every evening., Disp: , Rfl:     sertraline (ZOLOFT) 50 MG tablet, Take 1 tablet by mouth every morning., Disp: , Rfl:     sodium bicarbonate 650 MG tablet, Take 2 tablets (1,300 mg total) by mouth as needed (start before bladder installation to reduce irritation). 2 tabs night before bladder installation then 2 tabs the morning of the bladder installation, Disp: 24 tablet, Rfl: 1    sulfamethoxazole-trimethoprim 800-160mg (BACTRIM DS) 800-160 mg Tab, Take 1 tablet by mouth 2 (two) times daily., Disp: , Rfl:     tamsulosin (FLOMAX) 0.4 mg Cap, Take 0.4 mg by mouth., Disp: , Rfl:     thiamine 100 MG tablet, Take 100 mg by mouth every morning., Disp: , Rfl:     thiamine mononitrate, vit B1, (VITAMIN B-1, MONONITRATE,) 100 mg Tab, Take 1 tablet by mouth every morning., Disp: , Rfl:     TURMERIC ORAL, Take 1 capsule by mouth once daily., Disp: , Rfl:     valsartan (DIOVAN) 40 MG tablet, Take 40 mg by mouth., Disp: , Rfl:     ZINC ORAL, Take 1 tablet by mouth once daily., Disp: , Rfl:   No current facility-administered medications for this visit.    PHYSICAL EXAMINATION:  Physical Exam  Vitals and nursing note reviewed.   Constitutional:       General: He is awake.      Appearance: Normal appearance.   HENT:      Head: Normocephalic.      Right Ear: External ear normal.      Left Ear: External ear normal.      Nose: Nose normal.   Cardiovascular:      Rate and Rhythm: Normal rate.   Pulmonary:      Effort: Pulmonary effort is normal. No respiratory distress.  "  Abdominal:      Tenderness: There is no abdominal tenderness. There is no right CVA tenderness or left CVA tenderness.   Genitourinary:     Penis: Normal and circumcised. No hypospadias, erythema or tenderness.       Testes: Normal.         Right: Swelling not present.         Left: Swelling not present.   Musculoskeletal:         General: Normal range of motion.      Cervical back: Normal range of motion.   Skin:     General: Skin is warm and dry.   Neurological:      General: No focal deficit present.      Mental Status: He is alert and oriented to person, place, and time.   Psychiatric:         Mood and Affect: Mood normal.         Behavior: Behavior is cooperative.           LABS:      In office UA today was clear of active infection and GH      No results found for: "PSA", "PSADIAG", "PSATOTAL", "PHIND"    Lab Results   Component Value Date    CREATININE 0.9 01/14/2024    EGFRNORACEVR >60.0 01/14/2024             IMPRESSION:    Encounter Diagnoses   Name Primary?    Malignant neoplasm of overlapping sites of bladder Yes    Encounter for follow-up surveillance of bladder cancer        Maintenance GEMCITABINE; dose 3 of 12       Assessment:       1. Malignant neoplasm of overlapping sites of bladder    2. Encounter for follow-up surveillance of bladder cancer        Plan:           I spent 40 minutes with the patient of which more than half was spent in direct consultation with the patient in regards to our treatment and plan.  We addressed the expectations for today's plan of care.  Reviewed the preparation:   Na Bicarb 1300mg night before then this morning.   No coffee or caffeine this morning;    We discussed their Bladder Cancer.  Discussed previous treatments and the expectations, benefits, risks with this new treatment.  Reviewed how this medication works. Possible side effects.   Discussed importance of post clean up for 6 hours after the 2 hour urination;    Diet modifications; no caffeine the morning " of installation; increase water intake at the 2 hour void to flush out.  Sit to urinate; flush twice; no bleach needed.  Clean urethra after each initial urination.   No sex for 48 hours after installation; use of condom during the 6 week installations.  Graves was placed and bladder drained.   I instilled the Gemcitabine 2000mg/100ml then removed catheter.   Tolerated well   He was instructed to hold this up to 120 minutes.  I cleaned urethra and sounding tissue.  Again reviewed post installation instructions.  Recommended lifestyle modifications with proper, healthy diet, good hydration if no fluid restrictions; reducing bladder irritants.   He not able to hold urine for 2 hours then will add OAB med  RTC in 4 weeks for Dose 4 of 12.   Cysto with Dr. Luong 05/02/2024.

## 2024-02-19 ENCOUNTER — OFFICE VISIT (OUTPATIENT)
Dept: UROLOGY | Facility: CLINIC | Age: 79
End: 2024-02-19
Payer: MEDICARE

## 2024-02-19 VITALS
BODY MASS INDEX: 25.89 KG/M2 | HEART RATE: 65 BPM | DIASTOLIC BLOOD PRESSURE: 67 MMHG | SYSTOLIC BLOOD PRESSURE: 126 MMHG | HEIGHT: 72 IN | WEIGHT: 191.13 LBS

## 2024-02-19 DIAGNOSIS — C67.8 MALIGNANT NEOPLASM OF OVERLAPPING SITES OF BLADDER: Primary | ICD-10-CM

## 2024-02-19 LAB
BILIRUB SERPL-MCNC: NORMAL MG/DL
BLOOD URINE, POC: NORMAL
CLARITY, POC UA: CLEAR
COLOR, POC UA: YELLOW
GLUCOSE UR QL STRIP: NORMAL
KETONES UR QL STRIP: NORMAL
LEUKOCYTE ESTERASE URINE, POC: NORMAL
NITRITE, POC UA: NORMAL
PH, POC UA: 5
PROTEIN, POC: NORMAL
SPECIFIC GRAVITY, POC UA: 1.02
UROBILINOGEN, POC UA: NORMAL

## 2024-02-19 PROCEDURE — 81002 URINALYSIS NONAUTO W/O SCOPE: CPT | Mod: PBBFAC | Performed by: NURSE PRACTITIONER

## 2024-02-19 PROCEDURE — 99215 OFFICE O/P EST HI 40 MIN: CPT | Mod: S$PBB,25,, | Performed by: NURSE PRACTITIONER

## 2024-02-19 PROCEDURE — 51720 TREATMENT OF BLADDER LESION: CPT | Mod: PBBFAC | Performed by: NURSE PRACTITIONER

## 2024-02-19 PROCEDURE — 99999PBSHW POCT URINE DIPSTICK WITHOUT MICROSCOPE: Mod: PBBFAC,,,

## 2024-02-19 PROCEDURE — 99999PBSHW PR PBB SHADOW TECHNICAL ONLY FILED TO HB: Mod: PBBFAC,,,

## 2024-02-19 PROCEDURE — 99214 OFFICE O/P EST MOD 30 MIN: CPT | Mod: PBBFAC | Performed by: NURSE PRACTITIONER

## 2024-02-19 PROCEDURE — 99999 PR PBB SHADOW E&M-EST. PATIENT-LVL IV: CPT | Mod: PBBFAC,,, | Performed by: NURSE PRACTITIONER

## 2024-02-19 PROCEDURE — 51720 TREATMENT OF BLADDER LESION: CPT | Mod: S$PBB,,, | Performed by: NURSE PRACTITIONER

## 2024-02-19 RX ADMIN — GEMCITABINE HYDROCHLORIDE 2000 MG: 1 INJECTION, POWDER, LYOPHILIZED, FOR SOLUTION INTRAVENOUS at 01:02

## 2024-02-19 NOTE — PROGRESS NOTES
CHIEF COMPLAINT:    Monroe Paul is a 78 y.o. male presents today for Bladder Cancer.     HISTORY OF PRESENTING ILLINESS:    Monroe Paul is a 78 y.o. male who is an established patient in our clinic.   He is newly diagnosed with NMI Bladder Cancer.   He had original TURBT in [place] and then went on to have repeat resection at [facility].   He was seen in clinic 07/17/2023 with Dr. Luong     09/21/2023 he completed Induction GEMCITABINE x 6 doses.   11/02/2023 (-) cysto with Dr. Luong.      11/20/2023 he began Maintenance Gemcitabine; monthly x 12 doses. .      Here today for Maintenance Gemcitabine; dose 4 of 12.      Reports he was able to hold it the 2 hours last time.   Did not take his Na Bicarb last month, but did take today.   Last night only got up once to urinate.   No dysuria/hematuria.        Urologic History:      6/9/2023 -- TURBT @ [place] -- HG Ta  6/29/2023 -- CT Urogram -- no nodes, no renal masses, no ureteral filling defects  6/30/2023 -- TURBT @ [facility] -- no tumor  08/08/2023-09/21/2023 -- Completed Induction Gemcitabine.   11/02/2023 (-) cysto with Dr. Luong.   11/20/2023 he began Maintenance Gemcitabine x 12 months                 REVIEW OF SYSTEMS:  Review of Systems   Constitutional: Negative.  Negative for chills and fever.   Eyes:  Negative for double vision.   Respiratory:  Negative for cough and shortness of breath.    Cardiovascular:  Negative for chest pain.   Gastrointestinal:  Negative for abdominal pain, constipation, diarrhea, nausea and vomiting.   Genitourinary: Negative.  Negative for dysuria, flank pain and hematuria.        No urinary complaints     Neurological:  Negative for dizziness and seizures.   Endo/Heme/Allergies:  Negative for polydipsia.         PATIENT HISTORY:    Past Medical History:   Diagnosis Date    Kidney stone        Past Surgical History:   Procedure Laterality Date    BLADDER SURGERY      HERNIA REPAIR         History reviewed. No pertinent  family history.    Social History     Socioeconomic History    Marital status:    Tobacco Use    Smoking status: Never    Smokeless tobacco: Never   Substance and Sexual Activity    Alcohol use: Yes    Drug use: Never    Sexual activity: Not Currently       Allergies:  Patient has no known allergies.    Medications:    Current Outpatient Medications:     celecoxib (CELEBREX) 200 MG capsule, Take 200 mg by mouth every morning., Disp: , Rfl:     cholecalciferol, vitamin D3, (VITAMIN D3) 25 mcg (1,000 unit) capsule, Take 5,000 Units by mouth., Disp: , Rfl:     cyanocobalamin 500 MCG tablet, Take 1 tablet by mouth every morning., Disp: , Rfl:     denosumab (PROLIA) 60 mg/mL Syrg, Inject 60 mg into the skin., Disp: , Rfl:     eszopiclone (LUNESTA) 3 mg Tab, Take 3 mg by mouth., Disp: , Rfl:     evolocumab (REPATHA SURECLICK) 140 mg/mL PnIj, Inject 140 mg into the skin., Disp: , Rfl:     ezetimibe (ZETIA) 10 mg tablet, Take 10 mg by mouth., Disp: , Rfl:     gabapentin (NEURONTIN) 300 MG capsule, Take by mouth., Disp: , Rfl:     hydroCHLOROthiazide (HYDRODIURIL) 50 MG tablet, Take 50 mg by mouth., Disp: , Rfl:     hyoscyamine (LEVSIN) 0.125 mg TbDL, Take by mouth., Disp: , Rfl:     hyoscyamine (LEVSIN) 0.125 mg TbDL, Take 0.125 mg by mouth., Disp: , Rfl:     imiquimod (ALDARA) 5 % cream, Apply 1 packet topically., Disp: , Rfl:     linaCLOtide (LINZESS) 290 mcg Cap capsule, Take 290 mcg by mouth., Disp: , Rfl:     LINZESS 290 mcg Cap capsule, Take 290 mcg by mouth., Disp: , Rfl:     LINZESS 72 mcg Cap capsule, Take 72 mcg by mouth every morning., Disp: , Rfl:     metoprolol succinate (TOPROL-XL) 25 MG 24 hr tablet, Take 25 mg by mouth., Disp: , Rfl:     metoprolol tartrate (LOPRESSOR) 50 MG tablet, Take as directed the night before and 1 hour prior to CT., Disp: , Rfl:     metroNIDAZOLE (METROGEL) 0.75 % gel, Apply 1 application  topically., Disp: , Rfl:     omega-3 fatty acids/fish oil (FISH OIL-OMEGA-3 FATTY ACIDS)  300-1,000 mg capsule, Take 2 capsules by mouth every evening., Disp: , Rfl:     oxyCODONE (ROXICODONE) 5 MG immediate release tablet, Take by mouth., Disp: , Rfl:     PERCOCET 5-325 mg per tablet, Take 1 tablet by mouth every 6 (six) hours as needed., Disp: , Rfl:     potassium chloride (KLOR-CON) 10 MEQ TbSR, Take 10 mEq by mouth., Disp: , Rfl:     rosuvastatin (CRESTOR) 40 MG Tab, Take 40 mg by mouth every evening., Disp: , Rfl:     sertraline (ZOLOFT) 50 MG tablet, Take 1 tablet by mouth every morning., Disp: , Rfl:     sodium bicarbonate 650 MG tablet, Take 2 tablets (1,300 mg total) by mouth as needed (start before bladder installation to reduce irritation). 2 tabs night before bladder installation then 2 tabs the morning of the bladder installation, Disp: 24 tablet, Rfl: 1    sulfamethoxazole-trimethoprim 800-160mg (BACTRIM DS) 800-160 mg Tab, Take 1 tablet by mouth 2 (two) times daily., Disp: , Rfl:     tamsulosin (FLOMAX) 0.4 mg Cap, Take 0.4 mg by mouth., Disp: , Rfl:     thiamine 100 MG tablet, Take 100 mg by mouth every morning., Disp: , Rfl:     thiamine mononitrate, vit B1, (VITAMIN B-1, MONONITRATE,) 100 mg Tab, Take 1 tablet by mouth every morning., Disp: , Rfl:     TURMERIC ORAL, Take 1 capsule by mouth once daily., Disp: , Rfl:     valsartan (DIOVAN) 40 MG tablet, Take 40 mg by mouth., Disp: , Rfl:     ZINC ORAL, Take 1 tablet by mouth once daily., Disp: , Rfl:     Current Facility-Administered Medications:     gemcitabine (GEMZAR) 2,000 mg in sodium chloride 0.9% SolP 100 mL bladder instillation, 2,000 mg, Intravesical, 1 time in Clinic/HOD, Junior Luong MD    PHYSICAL EXAMINATION:  Physical Exam  Vitals and nursing note reviewed.   Constitutional:       General: He is awake.      Appearance: Normal appearance.   HENT:      Head: Normocephalic.      Right Ear: External ear normal.      Left Ear: External ear normal.      Nose: Nose normal.   Cardiovascular:      Rate and Rhythm: Normal rate.  "  Pulmonary:      Effort: Pulmonary effort is normal. No respiratory distress.   Abdominal:      Tenderness: There is no abdominal tenderness. There is no right CVA tenderness or left CVA tenderness.   Genitourinary:     Penis: Normal and circumcised. No hypospadias, erythema or tenderness.       Testes: Normal.   Musculoskeletal:         General: Normal range of motion.      Cervical back: Normal range of motion.   Skin:     General: Skin is warm and dry.   Neurological:      General: No focal deficit present.      Mental Status: He is alert and oriented to person, place, and time.   Psychiatric:         Mood and Affect: Mood normal.         Behavior: Behavior is cooperative.           LABS:      In office UA today was clear of active infection and GH.       No results found for: "PSA", "PSADIAG", "PSATOTAL", "PHIND"    Lab Results   Component Value Date    CREATININE 0.9 01/14/2024    EGFRNORACEVR >60.0 01/14/2024             IMPRESSION:    Encounter Diagnoses   Name Primary?    Malignant neoplasm of overlapping sites of bladder Yes       Maintenance Gemcitabine; dose 4 of 12      Assessment:       1. Malignant neoplasm of overlapping sites of bladder        Plan:          I spent 40 minutes with the patient of which more than half was spent in direct consultation with the patient in regards to our treatment and plan.  We addressed the expectations for today's plan of care.  Reviewed the preparation:   Na Bicarb 1300mg night before then this morning.   No coffee or caffeine this morning;    We discussed their Bladder Cancer.  Discussed previous treatments and the expectations, benefits, risks with this new treatment.  Reviewed how this medication works. Possible side effects.   Discussed importance of post clean up for 6 hours after the 2 hour urination;    Diet modifications; no caffeine the morning of installation; increase water intake at the 2 hour void to flush out.  Sit to urinate; flush twice; no bleach " needed.  Clean urethra after each initial urination.   No sex for 48 hours after installation; use of condom during the 6 week installations.  Graves was placed and bladder drained.   I instilled the Gemcitabine 2000mg/100ml then removed catheter.   Tolerated well   He was instructed to hold this up to 120 minutes.  I cleaned urethra and sounding tissue.  Again reviewed post installation instructions.  Recommended lifestyle modifications with proper, healthy diet, good hydration if no fluid restrictions; reducing bladder irritants.   He not able to hold urine for 2 hours then will add OAB med  RTC in 4 weeks for Dose 5 of 12.   Cysto with Dr. Luong 05/02/2024.

## 2024-03-11 ENCOUNTER — OFFICE VISIT (OUTPATIENT)
Dept: UROLOGY | Facility: CLINIC | Age: 79
End: 2024-03-11
Payer: MEDICARE

## 2024-03-11 DIAGNOSIS — C67.9 MALIGNANT NEOPLASM OF URINARY BLADDER, UNSPECIFIED SITE: ICD-10-CM

## 2024-03-11 DIAGNOSIS — Z08 ENCOUNTER FOR FOLLOW-UP SURVEILLANCE OF BLADDER CANCER: ICD-10-CM

## 2024-03-11 DIAGNOSIS — Z85.51 ENCOUNTER FOR FOLLOW-UP SURVEILLANCE OF BLADDER CANCER: ICD-10-CM

## 2024-03-11 DIAGNOSIS — C67.8 MALIGNANT NEOPLASM OF OVERLAPPING SITES OF BLADDER: Primary | ICD-10-CM

## 2024-03-11 PROCEDURE — 51720 TREATMENT OF BLADDER LESION: CPT | Mod: PBBFAC

## 2024-03-11 PROCEDURE — 99999PBSHW PR PBB SHADOW TECHNICAL ONLY FILED TO HB: Mod: PBBFAC,,,

## 2024-03-11 PROCEDURE — 99213 OFFICE O/P EST LOW 20 MIN: CPT | Mod: PBBFAC

## 2024-03-11 PROCEDURE — 99215 OFFICE O/P EST HI 40 MIN: CPT | Mod: S$PBB,,,

## 2024-03-11 PROCEDURE — 99999 PR PBB SHADOW E&M-EST. PATIENT-LVL III: CPT | Mod: PBBFAC,,,

## 2024-03-11 RX ADMIN — GEMCITABINE HYDROCHLORIDE 2000 MG: 1 INJECTION, POWDER, LYOPHILIZED, FOR SOLUTION INTRAVENOUS at 01:03

## 2024-03-11 NOTE — PROGRESS NOTES
CHIEF COMPLAINT:  Bladder cancer      HISTORY OF PRESENTING ILLINESS:  Monroe Paul is a 78 y.o. male who is an established patient in our clinic.   He is newly diagnosed with NMI Bladder Cancer.   He had original TURBT in [place] and then went on to have repeat resection at [facility].   He was seen in clinic 07/17/2023 with Dr. Luong     09/21/2023 he completed Induction GEMCITABINE x 6 doses.   11/02/2023 (-) cysto with Dr. Luong.      11/20/2023 he began Maintenance Gemcitabine; monthly x 12 doses. .      Here today for Maintenance Gemcitabine; dose 5 of 12.      Reports he was able to hold it the 2 hours last time.   Did not take his Na Bicarb last month, but did take today.   No dysuria/hematuria.       Urologic History:   6/9/2023 -- TURBT @ [place] -- HG Ta  6/29/2023 -- CT Urogram -- no nodes, no renal masses, no ureteral filling defects  6/30/2023 -- TURBT @ [facility] -- no tumor  08/08/2023-09/21/2023 -- Completed Induction Gemcitabine.   11/02/2023 (-) cysto with Dr. Luong.   11/20/2023 he began Maintenance Gemcitabine x 12 months               REVIEW OF SYSTEMS:  Review of Systems   Constitutional:  Negative for chills and fever.   HENT:  Negative for congestion and sore throat.    Respiratory:  Negative for cough and shortness of breath.    Cardiovascular:  Negative for chest pain and palpitations.   Gastrointestinal:  Negative for nausea and vomiting.   Genitourinary:  Positive for frequency. Negative for dysuria, flank pain, hematuria and urgency.   Neurological:  Negative for dizziness and headaches.         PATIENT HISTORY:    Past Medical History:   Diagnosis Date    Kidney stone        Past Surgical History:   Procedure Laterality Date    BLADDER SURGERY      HERNIA REPAIR         History reviewed. No pertinent family history.    Social History     Socioeconomic History    Marital status:    Tobacco Use    Smoking status: Never    Smokeless tobacco: Never   Substance and Sexual  Activity    Alcohol use: Yes    Drug use: Never    Sexual activity: Not Currently       Allergies:  Patient has no known allergies.    Medications:    Current Outpatient Medications:     celecoxib (CELEBREX) 200 MG capsule, Take 200 mg by mouth every morning., Disp: , Rfl:     cholecalciferol, vitamin D3, (VITAMIN D3) 25 mcg (1,000 unit) capsule, Take 5,000 Units by mouth., Disp: , Rfl:     cyanocobalamin 500 MCG tablet, Take 1 tablet by mouth every morning., Disp: , Rfl:     denosumab (PROLIA) 60 mg/mL Syrg, Inject 60 mg into the skin., Disp: , Rfl:     eszopiclone (LUNESTA) 3 mg Tab, Take 3 mg by mouth., Disp: , Rfl:     evolocumab (REPATHA SURECLICK) 140 mg/mL PnIj, Inject 140 mg into the skin., Disp: , Rfl:     ezetimibe (ZETIA) 10 mg tablet, Take 10 mg by mouth., Disp: , Rfl:     gabapentin (NEURONTIN) 300 MG capsule, Take by mouth., Disp: , Rfl:     hydroCHLOROthiazide (HYDRODIURIL) 50 MG tablet, Take 50 mg by mouth., Disp: , Rfl:     hyoscyamine (LEVSIN) 0.125 mg TbDL, Take by mouth., Disp: , Rfl:     hyoscyamine (LEVSIN) 0.125 mg TbDL, Take 0.125 mg by mouth., Disp: , Rfl:     imiquimod (ALDARA) 5 % cream, Apply 1 packet topically., Disp: , Rfl:     linaCLOtide (LINZESS) 290 mcg Cap capsule, Take 290 mcg by mouth., Disp: , Rfl:     LINZESS 290 mcg Cap capsule, Take 290 mcg by mouth., Disp: , Rfl:     LINZESS 72 mcg Cap capsule, Take 72 mcg by mouth every morning., Disp: , Rfl:     metoprolol succinate (TOPROL-XL) 25 MG 24 hr tablet, Take 25 mg by mouth., Disp: , Rfl:     metoprolol tartrate (LOPRESSOR) 50 MG tablet, Take as directed the night before and 1 hour prior to CT., Disp: , Rfl:     metroNIDAZOLE (METROGEL) 0.75 % gel, Apply 1 application  topically., Disp: , Rfl:     omega-3 fatty acids/fish oil (FISH OIL-OMEGA-3 FATTY ACIDS) 300-1,000 mg capsule, Take 2 capsules by mouth every evening., Disp: , Rfl:     oxyCODONE (ROXICODONE) 5 MG immediate release tablet, Take by mouth., Disp: , Rfl:     PERCOCET  5-325 mg per tablet, Take 1 tablet by mouth every 6 (six) hours as needed., Disp: , Rfl:     potassium chloride (KLOR-CON) 10 MEQ TbSR, Take 10 mEq by mouth., Disp: , Rfl:     rosuvastatin (CRESTOR) 40 MG Tab, Take 40 mg by mouth every evening., Disp: , Rfl:     sertraline (ZOLOFT) 50 MG tablet, Take 1 tablet by mouth every morning., Disp: , Rfl:     sodium bicarbonate 650 MG tablet, Take 2 tablets (1,300 mg total) by mouth as needed (start before bladder installation to reduce irritation). 2 tabs night before bladder installation then 2 tabs the morning of the bladder installation, Disp: 24 tablet, Rfl: 1    sulfamethoxazole-trimethoprim 800-160mg (BACTRIM DS) 800-160 mg Tab, Take 1 tablet by mouth 2 (two) times daily., Disp: , Rfl:     tamsulosin (FLOMAX) 0.4 mg Cap, Take 0.4 mg by mouth., Disp: , Rfl:     thiamine 100 MG tablet, Take 100 mg by mouth every morning., Disp: , Rfl:     thiamine mononitrate, vit B1, (VITAMIN B-1, MONONITRATE,) 100 mg Tab, Take 1 tablet by mouth every morning., Disp: , Rfl:     TURMERIC ORAL, Take 1 capsule by mouth once daily., Disp: , Rfl:     valsartan (DIOVAN) 40 MG tablet, Take 40 mg by mouth., Disp: , Rfl:     ZINC ORAL, Take 1 tablet by mouth once daily., Disp: , Rfl:   No current facility-administered medications for this visit.    PHYSICAL EXAMINATION:  Physical Exam  Constitutional:       Appearance: Normal appearance.   HENT:      Head: Normocephalic and atraumatic.      Right Ear: External ear normal.      Left Ear: External ear normal.      Nose: Nose normal.      Mouth/Throat:      Mouth: Mucous membranes are moist.   Pulmonary:      Effort: Pulmonary effort is normal. No respiratory distress.   Skin:     General: Skin is warm and dry.   Neurological:      General: No focal deficit present.      Mental Status: He is alert and oriented to person, place, and time.   Psychiatric:         Mood and Affect: Mood normal.         Behavior: Behavior normal.           LABS:  UA  WNL    Lab Results   Component Value Date    CREATININE 0.9 01/14/2024    EGFRNORACEVR >60.0 01/14/2024             IMPRESSION:    Encounter Diagnoses   Name Primary?    Malignant neoplasm of overlapping sites of bladder Yes    Encounter for follow-up surveillance of bladder cancer     Malignant neoplasm of urinary bladder, unspecified site          Assessment:       1. Malignant neoplasm of overlapping sites of bladder    2. Encounter for follow-up surveillance of bladder cancer    3. Malignant neoplasm of urinary bladder, unspecified site        Plan:   I spent 40 minutes with the patient of which more than half was spent in direct consultation with the patient in regards to our treatment and plan.  We addressed the expectations for today's plan of care.  Reviewed the preparation:   Na Bicarb 1300 mg night before then this morning.   No coffee or caffeine this morning;    We discussed their Bladder Cancer.  Discussed previous treatments and the expectations, benefits, risks with this new treatment.  Reviewed how this medication works. Possible side effects.   Discussed importance of post clean up for 6 hours after the 2 hour urination;    Diet modifications; no caffeine the morning of installation; increase water intake at the 2 hour void to flush out.  Sit to urinate; flush twice; no bleach needed.  Clean urethra after each initial urination.   No sex for 48 hours after installation; use of condom during the 6 week installations.  Graves was placed and bladder drained.   I instilled the Gemcitabine 2000mg/100ml then removed catheter.   Tolerated well   He was instructed to hold this up to 120 minutes.  I cleaned urethra and sounding tissue.  Again reviewed post installation instructions.  Recommended lifestyle modifications with proper, healthy diet, good hydration if no fluid restrictions; reducing bladder irritants.   He not able to hold urine for 2 hours then will add OAB med  RTC in 4 weeks for Dose 6 of 12.    Son[place] with Dr. Luong 05/02/2024.

## 2024-04-10 ENCOUNTER — PATIENT MESSAGE (OUTPATIENT)
Dept: UROLOGY | Facility: CLINIC | Age: 79
End: 2024-04-10

## 2024-04-10 ENCOUNTER — OFFICE VISIT (OUTPATIENT)
Dept: UROLOGY | Facility: CLINIC | Age: 79
End: 2024-04-10
Payer: MEDICARE

## 2024-04-10 ENCOUNTER — LAB VISIT (OUTPATIENT)
Dept: LAB | Facility: HOSPITAL | Age: 79
End: 2024-04-10
Payer: MEDICARE

## 2024-04-10 VITALS
HEART RATE: 80 BPM | DIASTOLIC BLOOD PRESSURE: 63 MMHG | WEIGHT: 185.19 LBS | SYSTOLIC BLOOD PRESSURE: 110 MMHG | BODY MASS INDEX: 25.08 KG/M2 | HEIGHT: 72 IN

## 2024-04-10 DIAGNOSIS — Z85.51 ENCOUNTER FOR FOLLOW-UP SURVEILLANCE OF BLADDER CANCER: ICD-10-CM

## 2024-04-10 DIAGNOSIS — Z08 ENCOUNTER FOR FOLLOW-UP SURVEILLANCE OF BLADDER CANCER: ICD-10-CM

## 2024-04-10 DIAGNOSIS — C67.8 MALIGNANT NEOPLASM OF OVERLAPPING SITES OF BLADDER: ICD-10-CM

## 2024-04-10 DIAGNOSIS — C67.8 MALIGNANT NEOPLASM OF OVERLAPPING SITES OF BLADDER: Primary | ICD-10-CM

## 2024-04-10 LAB
BILIRUB SERPL-MCNC: NORMAL MG/DL
BLOOD URINE, POC: NORMAL
CLARITY, POC UA: CLEAR
COLOR, POC UA: YELLOW
CREAT SERPL-MCNC: 0.9 MG/DL (ref 0.5–1.4)
EST. GFR  (NO RACE VARIABLE): >60 ML/MIN/1.73 M^2
GLUCOSE UR QL STRIP: NORMAL
KETONES UR QL STRIP: NORMAL
LEUKOCYTE ESTERASE URINE, POC: NORMAL
NITRITE, POC UA: NORMAL
PH, POC UA: 5
PROTEIN, POC: NORMAL
SPECIFIC GRAVITY, POC UA: 1.02
UROBILINOGEN, POC UA: NORMAL

## 2024-04-10 PROCEDURE — 99999PBSHW POCT URINE DIPSTICK WITHOUT MICROSCOPE: Mod: PBBFAC,,,

## 2024-04-10 PROCEDURE — 99999 PR PBB SHADOW E&M-EST. PATIENT-LVL V: CPT | Mod: PBBFAC,,, | Performed by: NURSE PRACTITIONER

## 2024-04-10 PROCEDURE — 36415 COLL VENOUS BLD VENIPUNCTURE: CPT | Performed by: NURSE PRACTITIONER

## 2024-04-10 PROCEDURE — 51720 TREATMENT OF BLADDER LESION: CPT | Mod: PBBFAC | Performed by: NURSE PRACTITIONER

## 2024-04-10 PROCEDURE — 82565 ASSAY OF CREATININE: CPT | Performed by: NURSE PRACTITIONER

## 2024-04-10 PROCEDURE — 99499 UNLISTED E&M SERVICE: CPT | Mod: S$PBB,,, | Performed by: NURSE PRACTITIONER

## 2024-04-10 PROCEDURE — 99999PBSHW PR PBB SHADOW TECHNICAL ONLY FILED TO HB: Mod: PBBFAC,,,

## 2024-04-10 PROCEDURE — 99215 OFFICE O/P EST HI 40 MIN: CPT | Mod: PBBFAC | Performed by: NURSE PRACTITIONER

## 2024-04-10 PROCEDURE — 81002 URINALYSIS NONAUTO W/O SCOPE: CPT | Mod: PBBFAC | Performed by: NURSE PRACTITIONER

## 2024-04-10 PROCEDURE — 51720 TREATMENT OF BLADDER LESION: CPT | Mod: S$PBB,,, | Performed by: NURSE PRACTITIONER

## 2024-04-10 RX ADMIN — GEMCITABINE HYDROCHLORIDE 2000 MG: 1 INJECTION, SOLUTION INTRAVENOUS at 02:04

## 2024-04-10 NOTE — PROGRESS NOTES
CHIEF COMPLAINT:    Monroe Paul is a 78 y.o. male presents today for Bladder Cancer     HISTORY OF PRESENTING ILLINESS:    Monroe Paul is a 78 y.o. male who is an established patient in our clinic.   He is newly diagnosed with NMI Bladder Cancer.   He had original TURBT in [place] and then went on to have repeat resection at [facility].   He was seen in clinic 07/17/2023 with Dr. Luong     09/21/2023 he completed Induction GEMCITABINE x 6 doses.   11/02/2023 (-) cysto with Dr. Luong.      11/20/2023 he began Maintenance Gemcitabine; monthly x 12 doses. .      Here today for Maintenance Gemcitabine; dose 6 of 12.      Reports he was able to hold it the 2 hours last time.   Did not take his Na Bicarb last month, but did take today.   Last night only got up once to urinate.   No dysuria/hematuria.        Urologic History:      6/9/2023 -- TURBT @ [place] -- HG Ta  6/29/2023 -- CT Urogram -- no nodes, no renal masses, no ureteral filling defects  6/30/2023 -- TURBT @ [facility] -- no tumor  08/08/2023-09/21/2023 -- Completed Induction Gemcitabine.   11/02/2023 (-) cysto with Dr. Luong.   11/20/2023 he began Maintenance Gemcitabine x 12 months                 REVIEW OF SYSTEMS:  Review of Systems   Constitutional: Negative.  Negative for chills and fever.   Eyes:  Negative for double vision.   Respiratory:  Negative for cough and shortness of breath.    Cardiovascular:  Negative for chest pain and palpitations.   Gastrointestinal:  Negative for abdominal pain, constipation, diarrhea, nausea and vomiting.   Genitourinary:         See HPI   Musculoskeletal:  Negative for falls.   Neurological:  Negative for dizziness and seizures.   Endo/Heme/Allergies:  Negative for polydipsia.         PATIENT HISTORY:    Past Medical History:   Diagnosis Date    Kidney stone        Past Surgical History:   Procedure Laterality Date    BLADDER SURGERY      HERNIA REPAIR         History reviewed. No pertinent family  history.    Social History     Socioeconomic History    Marital status:    Tobacco Use    Smoking status: Never    Smokeless tobacco: Never   Substance and Sexual Activity    Alcohol use: Yes    Drug use: Never    Sexual activity: Not Currently       Allergies:  Patient has no known allergies.    Medications:    Current Outpatient Medications:     celecoxib (CELEBREX) 200 MG capsule, Take 200 mg by mouth every morning., Disp: , Rfl:     cholecalciferol, vitamin D3, (VITAMIN D3) 25 mcg (1,000 unit) capsule, Take 5,000 Units by mouth., Disp: , Rfl:     cyanocobalamin 500 MCG tablet, Take 1 tablet by mouth every morning., Disp: , Rfl:     denosumab (PROLIA) 60 mg/mL Syrg, Inject 60 mg into the skin., Disp: , Rfl:     eszopiclone (LUNESTA) 3 mg Tab, Take 3 mg by mouth., Disp: , Rfl:     evolocumab (REPATHA SURECLICK) 140 mg/mL PnIj, Inject 140 mg into the skin., Disp: , Rfl:     ezetimibe (ZETIA) 10 mg tablet, Take 10 mg by mouth., Disp: , Rfl:     gabapentin (NEURONTIN) 300 MG capsule, Take by mouth., Disp: , Rfl:     hydroCHLOROthiazide (HYDRODIURIL) 50 MG tablet, Take 50 mg by mouth., Disp: , Rfl:     hyoscyamine (LEVSIN) 0.125 mg TbDL, Take by mouth., Disp: , Rfl:     hyoscyamine (LEVSIN) 0.125 mg TbDL, Take 0.125 mg by mouth., Disp: , Rfl:     imiquimod (ALDARA) 5 % cream, Apply 1 packet topically., Disp: , Rfl:     linaCLOtide (LINZESS) 290 mcg Cap capsule, Take 290 mcg by mouth., Disp: , Rfl:     LINZESS 290 mcg Cap capsule, Take 290 mcg by mouth., Disp: , Rfl:     LINZESS 72 mcg Cap capsule, Take 72 mcg by mouth every morning., Disp: , Rfl:     metoprolol succinate (TOPROL-XL) 25 MG 24 hr tablet, Take 25 mg by mouth., Disp: , Rfl:     metoprolol tartrate (LOPRESSOR) 50 MG tablet, Take as directed the night before and 1 hour prior to CT., Disp: , Rfl:     metroNIDAZOLE (METROGEL) 0.75 % gel, Apply 1 application  topically., Disp: , Rfl:     omega-3 fatty acids/fish oil (FISH OIL-OMEGA-3 FATTY ACIDS)  300-1,000 mg capsule, Take 2 capsules by mouth every evening., Disp: , Rfl:     oxyCODONE (ROXICODONE) 5 MG immediate release tablet, Take by mouth., Disp: , Rfl:     PERCOCET 5-325 mg per tablet, Take 1 tablet by mouth every 6 (six) hours as needed., Disp: , Rfl:     potassium chloride (KLOR-CON) 10 MEQ TbSR, Take 10 mEq by mouth., Disp: , Rfl:     rosuvastatin (CRESTOR) 40 MG Tab, Take 40 mg by mouth every evening., Disp: , Rfl:     sertraline (ZOLOFT) 50 MG tablet, Take 1 tablet by mouth every morning., Disp: , Rfl:     sodium bicarbonate 650 MG tablet, Take 2 tablets (1,300 mg total) by mouth as needed (start before bladder installation to reduce irritation). 2 tabs night before bladder installation then 2 tabs the morning of the bladder installation, Disp: 24 tablet, Rfl: 1    sulfamethoxazole-trimethoprim 800-160mg (BACTRIM DS) 800-160 mg Tab, Take 1 tablet by mouth 2 (two) times daily., Disp: , Rfl:     tamsulosin (FLOMAX) 0.4 mg Cap, Take 0.4 mg by mouth., Disp: , Rfl:     thiamine 100 MG tablet, Take 100 mg by mouth every morning., Disp: , Rfl:     thiamine mononitrate, vit B1, (VITAMIN B-1, MONONITRATE,) 100 mg Tab, Take 1 tablet by mouth every morning., Disp: , Rfl:     TURMERIC ORAL, Take 1 capsule by mouth once daily., Disp: , Rfl:     valsartan (DIOVAN) 40 MG tablet, Take 40 mg by mouth., Disp: , Rfl:     ZINC ORAL, Take 1 tablet by mouth once daily., Disp: , Rfl:     Current Facility-Administered Medications:     gemcitabine (GEMZAR) 2,000 mg in sodium chloride 0.9% SolP 100 mL bladder instillation, 2,000 mg, Intravesical, 1 time in Clinic/HOD, Junior Luong MD    PHYSICAL EXAMINATION:  Physical Exam  Vitals and nursing note reviewed.   Constitutional:       General: He is awake.      Appearance: Normal appearance.   HENT:      Head: Normocephalic.      Right Ear: External ear normal.      Left Ear: External ear normal.      Nose: Nose normal.   Cardiovascular:      Rate and Rhythm: Normal rate.  "  Pulmonary:      Effort: Pulmonary effort is normal. No respiratory distress.   Abdominal:      Tenderness: There is no abdominal tenderness. There is no right CVA tenderness or left CVA tenderness.   Genitourinary:     Penis: Normal.       Testes: Normal.   Musculoskeletal:         General: Normal range of motion.      Cervical back: Normal range of motion.   Skin:     General: Skin is warm and dry.   Neurological:      General: No focal deficit present.      Mental Status: He is alert and oriented to person, place, and time.   Psychiatric:         Mood and Affect: Mood normal.         Behavior: Behavior is cooperative.           LABS:      In office UA today was clear of active infection      No results found for: "PSA", "PSADIAG", "PSATOTAL", "PHIND"    Lab Results   Component Value Date    CREATININE 0.9 01/14/2024    EGFRNORACEVR >60.0 01/14/2024               IMPRESSION:    Encounter Diagnoses   Name Primary?    Malignant neoplasm of overlapping sites of bladder Yes    Encounter for follow-up surveillance of bladder cancer        Maintenance Gemcitabine; dose 6 of 12      Assessment:       1. Malignant neoplasm of overlapping sites of bladder    2. Encounter for follow-up surveillance of bladder cancer        Plan:         I spent 40 minutes with the patient of which more than half was spent in direct consultation with the patient in regards to our treatment and plan.  We addressed the expectations for today's plan of care.  Reviewed the preparation:   Na Bicarb 1300mg night before then this morning.   No coffee or caffeine this morning;    We discussed their Bladder Cancer.  Discussed previous treatments and the expectations, benefits, risks with this new treatment.  Reviewed how this medication works. Possible side effects.   Discussed importance of post clean up for 6 hours after the 2 hour urination;    Diet modifications; no caffeine the morning of installation; increase water intake at the 2 hour void " to flush out.  Sit to urinate; flush twice; no bleach needed  Clean urethra after initial urination.   No sex for 48 hours after installation; use of condom during the 6 week installations.  Narayan was placed and bladder drained.   I instilled the Gemcitabine 2000mg/100ml then removed the narayan.   I cleaned urethra and sounding tissue.  Again reviewed post installation instructions  Recommended lifestyle modifications with proper, healthy diet, good hydration if no fluid restrictions; reducing bladder irritants.    He scheduled for cysto with Dr. Luong May 2, 2024.   Will get a Ct Urogram before

## 2024-04-25 ENCOUNTER — HOSPITAL ENCOUNTER (OUTPATIENT)
Dept: RADIOLOGY | Facility: HOSPITAL | Age: 79
Discharge: HOME | End: 2024-04-25
Attending: NURSE PRACTITIONER
Payer: MEDICARE

## 2024-04-25 DIAGNOSIS — C67.8 MALIGNANT NEOPLASM OF OVERLAPPING SITES OF BLADDER: ICD-10-CM

## 2024-04-25 DIAGNOSIS — Z85.51 ENCOUNTER FOR FOLLOW-UP SURVEILLANCE OF BLADDER CANCER: ICD-10-CM

## 2024-04-25 DIAGNOSIS — Z08 ENCOUNTER FOR FOLLOW-UP SURVEILLANCE OF BLADDER CANCER: ICD-10-CM

## 2024-04-25 PROCEDURE — 74178 CT ABD&PLV WO CNTR FLWD CNTR: CPT | Mod: 26,,, | Performed by: RADIOLOGY

## 2024-04-25 PROCEDURE — 25500020 PHARM REV CODE 255: Performed by: NURSE PRACTITIONER

## 2024-04-25 PROCEDURE — 74178 CT ABD&PLV WO CNTR FLWD CNTR: CPT | Mod: TC

## 2024-04-25 RX ADMIN — IOHEXOL 100 ML: 350 INJECTION, SOLUTION INTRAVENOUS at 03:04

## 2024-05-02 ENCOUNTER — PROCEDURE VISIT (OUTPATIENT)
Dept: UROLOGY | Facility: CLINIC | Age: 79
End: 2024-05-02
Payer: MEDICARE

## 2024-05-02 VITALS
SYSTOLIC BLOOD PRESSURE: 106 MMHG | HEART RATE: 67 BPM | TEMPERATURE: 98 F | HEIGHT: 72 IN | RESPIRATION RATE: 18 BRPM | DIASTOLIC BLOOD PRESSURE: 67 MMHG | WEIGHT: 190.13 LBS | BODY MASS INDEX: 25.75 KG/M2

## 2024-05-02 DIAGNOSIS — C67.8 MALIGNANT NEOPLASM OF OVERLAPPING SITES OF BLADDER: ICD-10-CM

## 2024-05-02 PROCEDURE — 88112 CYTOPATH CELL ENHANCE TECH: CPT | Mod: 26,,, | Performed by: PATHOLOGY

## 2024-05-02 PROCEDURE — 52000 CYSTOURETHROSCOPY: CPT | Mod: PBBFAC | Performed by: STUDENT IN AN ORGANIZED HEALTH CARE EDUCATION/TRAINING PROGRAM

## 2024-05-02 PROCEDURE — 88112 CYTOPATH CELL ENHANCE TECH: CPT | Performed by: PATHOLOGY

## 2024-05-02 PROCEDURE — 52000 CYSTOURETHROSCOPY: CPT | Mod: S$PBB,,, | Performed by: STUDENT IN AN ORGANIZED HEALTH CARE EDUCATION/TRAINING PROGRAM

## 2024-05-02 RX ORDER — LIDOCAINE HYDROCHLORIDE 20 MG/ML
JELLY TOPICAL ONCE
Status: COMPLETED | OUTPATIENT
Start: 2024-05-02 | End: 2024-05-02

## 2024-05-02 RX ADMIN — LIDOCAINE HYDROCHLORIDE 10 ML: 20 JELLY TOPICAL at 01:05

## 2024-05-02 NOTE — PROCEDURES
Office Cystoscopy Procedure Note    Date of Procedure: 05/02/2024    Indication:  Urothelial carcinoma of the bladder      Informed consent:  The risks, benefits, complications, treatment options, and expected outcomes were discussed with the patient. The patient concurred with the proposed plan and provided informed consent.     Anesthesia: Lidocaine jelly 2%     Antibiotic prophylaxis: None     Procedure:  The patient was placed in the lithotomy position, was prepped and draped in the usual manner using sterile technique, and 2% lidocaine jelly instilled into the urethra.  A 17 F flexible cystoscope was then inserted into the urethra and the urethra and bladder carefully examined.  The following findings were noted:       Findings:   Urethra: normal     Prostate: mild hypertrophy, long prostate, small median lobe engrowth, no kissing lateral lobes    Bladder: no obvious recurrence, area on posterior wall with some purple and erythema could be early recurrence    Ureteral orifices:       -Right: Normal       -Left: Normal     Other findings:     None        Specimens: None                   Complications: None; patient tolerated the procedure well            Disposition: Home after brief observation     Condition: Stable     Plan: RTC in 3 months for surveillance cysto. Continue gem maintenance for now. Will follow up cytology    Attending Attestation:      I personally performed the procedure.       Junior Luong MD  Urologic Oncology  P: [phone]

## 2024-05-06 ENCOUNTER — OFFICE VISIT (OUTPATIENT)
Dept: UROLOGY | Facility: CLINIC | Age: 79
End: 2024-05-06
Payer: MEDICARE

## 2024-05-06 VITALS
BODY MASS INDEX: 25.2 KG/M2 | SYSTOLIC BLOOD PRESSURE: 105 MMHG | DIASTOLIC BLOOD PRESSURE: 62 MMHG | HEART RATE: 93 BPM | HEIGHT: 72 IN | WEIGHT: 186.06 LBS

## 2024-05-06 DIAGNOSIS — C67.8 MALIGNANT NEOPLASM OF OVERLAPPING SITES OF BLADDER: Primary | ICD-10-CM

## 2024-05-06 LAB
BILIRUB SERPL-MCNC: NORMAL MG/DL
BLOOD URINE, POC: NORMAL
CLARITY, POC UA: CLEAR
COLOR, POC UA: YELLOW
FINAL PATHOLOGIC DIAGNOSIS: NORMAL
GLUCOSE UR QL STRIP: NORMAL
KETONES UR QL STRIP: NORMAL
LEUKOCYTE ESTERASE URINE, POC: NORMAL
Lab: NORMAL
NITRITE, POC UA: NORMAL
PH, POC UA: 5
PROTEIN, POC: NORMAL
SPECIFIC GRAVITY, POC UA: 1.02
UROBILINOGEN, POC UA: NORMAL

## 2024-05-06 PROCEDURE — 51720 TREATMENT OF BLADDER LESION: CPT | Mod: S$PBB,,, | Performed by: NURSE PRACTITIONER

## 2024-05-06 PROCEDURE — 99214 OFFICE O/P EST MOD 30 MIN: CPT | Mod: PBBFAC | Performed by: NURSE PRACTITIONER

## 2024-05-06 PROCEDURE — 99999PBSHW POCT URINE DIPSTICK WITHOUT MICROSCOPE: Mod: PBBFAC,,,

## 2024-05-06 PROCEDURE — 99499 UNLISTED E&M SERVICE: CPT | Mod: S$PBB,,, | Performed by: NURSE PRACTITIONER

## 2024-05-06 PROCEDURE — 81002 URINALYSIS NONAUTO W/O SCOPE: CPT | Mod: PBBFAC | Performed by: NURSE PRACTITIONER

## 2024-05-06 PROCEDURE — 51720 TREATMENT OF BLADDER LESION: CPT | Mod: PBBFAC | Performed by: NURSE PRACTITIONER

## 2024-05-06 PROCEDURE — 99999PBSHW PR PBB SHADOW TECHNICAL ONLY FILED TO HB: Mod: PBBFAC,,,

## 2024-05-06 PROCEDURE — 99999 PR PBB SHADOW E&M-EST. PATIENT-LVL IV: CPT | Mod: PBBFAC,,, | Performed by: NURSE PRACTITIONER

## 2024-05-06 RX ADMIN — GEMCITABINE HYDROCHLORIDE 2000 MG: 1 INJECTION, POWDER, LYOPHILIZED, FOR SOLUTION INTRAVENOUS at 01:05

## 2024-05-06 NOTE — PROGRESS NOTES
CHIEF COMPLAINT:    Monroe Paul is a 78 y.o. male presents today for Bladder Cancer.     HISTORY OF PRESENTING ILLINESS:    Monroe Paul is a 78 y.o. male who is an established patient in our clinic.   He is newly diagnosed with NMI Bladder Cancer.   He had original TURBT in [place] and then went on to have repeat resection at [facility].   He was seen in clinic 07/17/2023 with Dr. Luong     09/21/2023 he completed Induction GEMCITABINE x 6 doses.   11/02/2023 (-) cysto with Dr. Luong.      11/20/2023 he began Maintenance Gemcitabine; monthly x 12 doses. .      Here today for Maintenance Gemcitabine; dose 7 of 12.      Reports he was able to hold it the 2 hours last time.   Did not take his Na Bicarb last month, but did take today.   Last night only got up once to urinate.   No dysuria/hematuria.        Urologic History:      6/9/2023 -- TURBT @ [place] -- HG Ta  6/29/2023 -- CT Urogram -- no nodes, no renal masses, no ureteral filling defects  6/30/2023 -- TURBT @ [facility] -- no tumor  08/08/2023-09/21/2023 -- Completed Induction Gemcitabine.   11/02/2023 (-) cysto with Dr. Luong.   11/20/2023 he began Maintenance Gemcitabine x 12 months  04/25/2024 CTU   05/02/2024 normal cysto                 REVIEW OF SYSTEMS:  Review of Systems   Constitutional: Negative.  Negative for chills and fever.   Eyes:  Negative for double vision.   Respiratory:  Negative for cough and shortness of breath.    Cardiovascular:  Negative for chest pain and palpitations.   Gastrointestinal:  Negative for abdominal pain, constipation, diarrhea, nausea and vomiting.   Genitourinary:  Negative for dysuria, hematuria and urgency.        See HPI   Musculoskeletal:  Negative for falls.   Neurological:  Negative for dizziness and seizures.   Endo/Heme/Allergies:  Negative for polydipsia.         PATIENT HISTORY:    Past Medical History:   Diagnosis Date    Kidney stone        Past Surgical History:   Procedure Laterality Date     BLADDER SURGERY      HERNIA REPAIR         No family history on file.    Social History     Socioeconomic History    Marital status:    Tobacco Use    Smoking status: Never    Smokeless tobacco: Never   Substance and Sexual Activity    Alcohol use: Yes    Drug use: Never    Sexual activity: Not Currently       Allergies:  Patient has no known allergies.    Medications:    Current Outpatient Medications:     celecoxib (CELEBREX) 200 MG capsule, Take 200 mg by mouth every morning., Disp: , Rfl:     cholecalciferol, vitamin D3, (VITAMIN D3) 25 mcg (1,000 unit) capsule, Take 5,000 Units by mouth., Disp: , Rfl:     cyanocobalamin 500 MCG tablet, Take 1 tablet by mouth every morning., Disp: , Rfl:     denosumab (PROLIA) 60 mg/mL Syrg, Inject 60 mg into the skin., Disp: , Rfl:     eszopiclone (LUNESTA) 3 mg Tab, Take 3 mg by mouth., Disp: , Rfl:     evolocumab (REPATHA SURECLICK) 140 mg/mL PnIj, Inject 140 mg into the skin., Disp: , Rfl:     ezetimibe (ZETIA) 10 mg tablet, Take 10 mg by mouth., Disp: , Rfl:     gabapentin (NEURONTIN) 300 MG capsule, Take by mouth., Disp: , Rfl:     hydroCHLOROthiazide (HYDRODIURIL) 50 MG tablet, Take 50 mg by mouth., Disp: , Rfl:     hyoscyamine (LEVSIN) 0.125 mg TbDL, Take by mouth., Disp: , Rfl:     hyoscyamine (LEVSIN) 0.125 mg TbDL, Take 0.125 mg by mouth., Disp: , Rfl:     imiquimod (ALDARA) 5 % cream, Apply 1 packet topically., Disp: , Rfl:     linaCLOtide (LINZESS) 290 mcg Cap capsule, Take 290 mcg by mouth., Disp: , Rfl:     LINZESS 290 mcg Cap capsule, Take 290 mcg by mouth., Disp: , Rfl:     LINZESS 72 mcg Cap capsule, Take 72 mcg by mouth every morning., Disp: , Rfl:     metoprolol succinate (TOPROL-XL) 25 MG 24 hr tablet, Take 25 mg by mouth., Disp: , Rfl:     metoprolol tartrate (LOPRESSOR) 50 MG tablet, Take as directed the night before and 1 hour prior to CT., Disp: , Rfl:     metroNIDAZOLE (METROGEL) 0.75 % gel, Apply 1 application  topically., Disp: , Rfl:      omega-3 fatty acids/fish oil (FISH OIL-OMEGA-3 FATTY ACIDS) 300-1,000 mg capsule, Take 2 capsules by mouth every evening., Disp: , Rfl:     oxyCODONE (ROXICODONE) 5 MG immediate release tablet, Take by mouth., Disp: , Rfl:     PERCOCET 5-325 mg per tablet, Take 1 tablet by mouth every 6 (six) hours as needed., Disp: , Rfl:     potassium chloride (KLOR-CON) 10 MEQ TbSR, Take 10 mEq by mouth., Disp: , Rfl:     rosuvastatin (CRESTOR) 40 MG Tab, Take 40 mg by mouth every evening., Disp: , Rfl:     sertraline (ZOLOFT) 50 MG tablet, Take 1 tablet by mouth every morning., Disp: , Rfl:     sodium bicarbonate 650 MG tablet, Take 2 tablets (1,300 mg total) by mouth as needed (start before bladder installation to reduce irritation). 2 tabs night before bladder installation then 2 tabs the morning of the bladder installation, Disp: 24 tablet, Rfl: 1    sulfamethoxazole-trimethoprim 800-160mg (BACTRIM DS) 800-160 mg Tab, Take 1 tablet by mouth 2 (two) times daily., Disp: , Rfl:     tamsulosin (FLOMAX) 0.4 mg Cap, Take 0.4 mg by mouth., Disp: , Rfl:     thiamine 100 MG tablet, Take 100 mg by mouth every morning., Disp: , Rfl:     thiamine mononitrate, vit B1, (VITAMIN B-1, MONONITRATE,) 100 mg Tab, Take 1 tablet by mouth every morning., Disp: , Rfl:     TURMERIC ORAL, Take 1 capsule by mouth once daily., Disp: , Rfl:     valsartan (DIOVAN) 40 MG tablet, Take 40 mg by mouth., Disp: , Rfl:     ZINC ORAL, Take 1 tablet by mouth once daily., Disp: , Rfl:     Current Facility-Administered Medications:     gemcitabine (GEMZAR) 2,000 mg in sodium chloride 0.9% SolP 100 mL bladder instillation, 2,000 mg, Intravesical, 1 time in Clinic/HOD, Junior Luong MD    PHYSICAL EXAMINATION:  Physical Exam  Vitals and nursing note reviewed.   Constitutional:       General: He is awake.      Appearance: Normal appearance.   HENT:      Head: Normocephalic.      Right Ear: External ear normal.      Left Ear: External ear normal.      Nose: Nose  "normal.   Cardiovascular:      Rate and Rhythm: Normal rate.   Pulmonary:      Effort: Pulmonary effort is normal. No respiratory distress.   Abdominal:      Tenderness: There is no abdominal tenderness. There is no right CVA tenderness or left CVA tenderness.   Genitourinary:     Penis: Normal and circumcised.       Testes: Normal.         Right: Mass, tenderness or swelling not present.         Left: Mass, tenderness or swelling not present.   Musculoskeletal:         General: Normal range of motion.      Cervical back: Normal range of motion.   Skin:     General: Skin is warm and dry.   Neurological:      General: No focal deficit present.      Mental Status: He is alert and oriented to person, place, and time.   Psychiatric:         Mood and Affect: Mood normal.         Behavior: Behavior is cooperative.           LABS:      In office UA today was clear of active infection and blood.       No results found for: "PSA", "PSADIAG", "PSATOTAL", "PHIND"    Lab Results   Component Value Date    CREATININE 0.9 04/10/2024    EGFRNORACEVR >60.0 04/10/2024               IMPRESSION:    Encounter Diagnoses   Name Primary?    Malignant neoplasm of overlapping sites of bladder Yes       MAINTENANCE GEMCITABINE; dose 7 of 12      Assessment:       1. Malignant neoplasm of overlapping sites of bladder        Plan:         I spent 40 minutes with the patient of which more than half was spent in direct consultation with the patient in regards to our treatment and plan.  We addressed the expectations for today's plan of care.  Reviewed the preparation:   Na Bicarb 1300mg night before then this morning.   No coffee or caffeine this morning;    We discussed their Bladder Cancer.  Discussed previous treatments and the expectations, benefits, risks with this new treatment.  Reviewed how this medication works. Possible side effects.   Discussed importance of post clean up for 6 hours after the 2 hour urination;    Diet modifications; " no caffeine the morning of installation; increase water intake at the 2 hour void to flush out.  Sit to urinate; flush twice; no bleach needed  Clean urethra after initial urination.   No sex for 48 hours after installation; use of condom during the 6 week installations.  Narayan was placed and bladder drained.   I instilled the Gemcitabine 2000mg/100ml then removed the narayan.   I cleaned urethra and sounding tissue.  Again reviewed post installation instructions  Recommended lifestyle modifications with proper, healthy diet, good hydration if no fluid restrictions; reducing bladder irritants.    RTC in 4 weeks for Dose 8 of 12

## 2024-05-07 ENCOUNTER — HOSPITAL ENCOUNTER (OUTPATIENT)
Dept: PSYCHIATRY | Facility: HOSPITAL | Age: 79
Discharge: HOME | End: 2024-05-07
Attending: PSYCHIATRY & NEUROLOGY
Payer: MEDICARE

## 2024-05-07 ENCOUNTER — PATIENT MESSAGE (OUTPATIENT)
Dept: INTERNAL MEDICINE | Facility: CLINIC | Age: 79
End: 2024-05-07
Payer: COMMERCIAL

## 2024-05-07 ENCOUNTER — LAB VISIT (OUTPATIENT)
Dept: LAB | Facility: HOSPITAL | Age: 79
End: 2024-05-07
Attending: PSYCHIATRY & NEUROLOGY
Payer: MEDICARE

## 2024-05-07 DIAGNOSIS — F41.1 GAD (GENERALIZED ANXIETY DISORDER): ICD-10-CM

## 2024-05-07 DIAGNOSIS — F10.20 ALCOHOL USE DISORDER, MODERATE, DEPENDENCE: ICD-10-CM

## 2024-05-07 DIAGNOSIS — F10.20 ALCOHOL USE DISORDER, MODERATE, DEPENDENCE: Primary | ICD-10-CM

## 2024-05-07 DIAGNOSIS — F10.90 ALCOHOL USE DISORDER: Primary | ICD-10-CM

## 2024-05-07 LAB
ALBUMIN SERPL BCP-MCNC: 4.5 G/DL (ref 3.5–5.2)
ALP SERPL-CCNC: 57 U/L (ref 55–135)
ALT SERPL W/O P-5'-P-CCNC: 15 U/L (ref 10–44)
ANION GAP SERPL CALC-SCNC: 10 MMOL/L (ref 8–16)
AST SERPL-CCNC: 19 U/L (ref 10–40)
BILIRUB SERPL-MCNC: 1.4 MG/DL (ref 0.1–1)
BUN SERPL-MCNC: 19 MG/DL (ref 8–23)
CALCIUM SERPL-MCNC: 10.4 MG/DL (ref 8.7–10.5)
CHLORIDE SERPL-SCNC: 102 MMOL/L (ref 95–110)
CO2 SERPL-SCNC: 28 MMOL/L (ref 23–29)
CREAT SERPL-MCNC: 0.9 MG/DL (ref 0.5–1.4)
EST. GFR  (NO RACE VARIABLE): >60 ML/MIN/1.73 M^2
GLUCOSE SERPL-MCNC: 94 MG/DL (ref 70–110)
POTASSIUM SERPL-SCNC: 3.8 MMOL/L (ref 3.5–5.1)
PROT SERPL-MCNC: 7.6 G/DL (ref 6–8.4)
SODIUM SERPL-SCNC: 140 MMOL/L (ref 136–145)

## 2024-05-07 PROCEDURE — 90853 GROUP PSYCHOTHERAPY: CPT | Mod: ,,, | Performed by: PSYCHOLOGIST

## 2024-05-07 PROCEDURE — 36415 COLL VENOUS BLD VENIPUNCTURE: CPT | Performed by: PSYCHIATRY & NEUROLOGY

## 2024-05-07 PROCEDURE — 80053 COMPREHEN METABOLIC PANEL: CPT | Performed by: PSYCHIATRY & NEUROLOGY

## 2024-05-07 PROCEDURE — 90853 GROUP PSYCHOTHERAPY: CPT

## 2024-05-07 PROCEDURE — 90792 PSYCH DIAG EVAL W/MED SRVCS: CPT | Mod: GC,,, | Performed by: PSYCHIATRY & NEUROLOGY

## 2024-05-07 PROCEDURE — 90853 GROUP PSYCHOTHERAPY: CPT | Performed by: SOCIAL WORKER

## 2024-05-07 PROCEDURE — 80321 ALCOHOLS BIOMARKERS 1OR 2: CPT | Performed by: PSYCHIATRY & NEUROLOGY

## 2024-05-07 NOTE — PROGRESS NOTES
274}  INITIAL VISIT: PSYCHIATRY  [facility]      ASSESSMENT AND PLAN:     DIAGNOSES & PROBLEMS:  Alcohol use disorder   Anxiety, unspecified     In Summary:  Pt is a 77yo male presenting to OR today to continue treatment for alcohol use disorder.  Pt had presentation to ED in January 2024 which was found to be alcohol intoxication.  Since that event, pt went to inpatient detox/rehab at the insistence of his wife and daughter.  Pt reports relapse since discharge of a few drinks, but denies persistent or daily alcohol use.  Last drink was reportedly about a week ago.  He feels that his current medications have been very helpful and denies medication side effects.  Pt is motivated to be in the program at this time due to the insistence of his wife and daughter.     Plan:  -continue gabapentin 300mg bid, Zoloft 100mg daily, and naltrexone 50mg daily     - admit to ORP and initiate program protocol - patient oriented to rules and expectations of the program; while in program will monitor routine VS, breathalyzer and alcohol/drug screenings  - monitor random drug/alcohol screening  - routine monitoring of PETH levels to assess for maintenance of sobriety  - full engagement in 12 step (or equivalent) recovery program(s), including mandatory meeting attendance and acquisition/maintenance of sponsor  - relapse prevention and motivational interviewing provided       PRESENTATION:     Monroe Paul presents with the following chief complaint: alcohol abuse    Per Chart:  Pt with presentation January 2024 to ED for slurred speech.  Stroke workup was negative and patient was found to have an EtOH level of 256.  Pt had driven from [place] himself for the evaluation.  Per chart review, his wife said that they were on a cruise recently a couple of weeks ago and said that he had been hiding bottles of alcohol from her. She thought that he had stopped drinking.     Per Patient:  Pt says since the pandemic he has  been drinking much more heavily.  Alcohol use has increased since then.  IN September he fell and broke his elbow when on the golf course.  Pt was untruthful with his wife who was concerned about his alcohol use around November or December.  In January he had a drink that morning and was very concerned he was having a seizure or something bad.  He was evaluated in the ED and found to have alcohol intoxication.  Daughter and wife insisted he needed inpatient detox.  He went to a facility in [place], [place] for 4 weeks mid January to mid February.  When coming home from the program, he started drinking some again.  Last drink was 1 week ago.  He did a couple of shots at that time, but didn't keep drinking because he didn't find it pleasurable.  Pt is on naltrexone and finds it to be helpful.  Gabapentin was also started while in rehab.  Since cutting back and recently stopping drinking, he feels physically much better.  He also sleeps well since he stopped.  When drinking at his heaviest, states he was drinking 3-4 glasses of wine once daily every day.  Denies having withdrawal symptoms when stopping.  Denies cravings.  He is not currently in a 12 step program.  Pt is in the ORP at the insistence of his wife and daughter, who feels he should have more support.        Pt taking Zoloft 100mg daily.  This was originally started by his PCP after fracture of his elbow.  Pt says that his daughter specifically asked for him to be on Zoloft and he isn't sure why it was started.  He feels that helps him sleep because his mind isn't thinking as much when he tries to sleep.  Denies ever feeling sad/depressed or anxiety. Denies any side effects to current medications.         Collateral:   Kelsie Paul, Wife  [phone]  He prefers that his wife not be contacted for collateral.  He is comfortable with his daughter being contacted.      Lisa Chisholm   [phone]  Attempted to call. No answer.  Will try again later.       REVIEW  OF SYSTEMS:    [] Y  [x] N  sleep disturbance:   [x] Y  [] N  appetite/weight change: lost 12lbs when stopped drinking, which he is happy about   [] Y  [x] N  fatigue/anergia:  [] Y  [x] N  impairment in focus/concentration:     [] Y  [x] N  depression:   [] Y  [x] N  anxiety/worry:   [] Y  [x] N  dysregulated mood/behavior:  [] Y  [x] N  manic symptomatology:   [] Y  [x] N  psychosis:       A pertinent medical review of systems was performed with the following notable findings: none    CURRENT PSYCHOTROPIC REGIMEN:  I[x]I Y  I[]I N  I[]I U    Naltrexone  daily (unknown dose), Zoloft 100mg daily      ADDICTION:     I[]I N/A  I[]I Y  I[x]I N  I[]I U  WITHDRAWAL SYMPTOMS:   I[]I N/A  I[]I Y  I[x]I N  I[]I U  CRAVINGS:     I[]I Y  I[x]I N  I[]I U  I[]I Current  I[]I Former  Nicotine Use:   I[]I Y  I[x]I N  I[]I U  I[]I Current  I[]I Former  Alcohol Use:   I[]I Y  I[x]I N  I[]I U  I[]I Current  I[]I Former  Alcohol Misuse/Abuse:   I[]I Y  I[x]I N  I[]I U  I[]I Current  I[]I Former  Illicit Drug Use/Misuse/Abuse:   I[]I Y  I[x]I N  I[]I U  I[]I Current  I[]I Former  Misuse/Abuse of Rx Medications:   I[]I Cannabis  I[]I Cocaine  I[]I Heroin  I[]I Meth  I[]I Opioids  I[]I Stimulants  I[]I Benzos  I[]I Other:     I[]I N/A  I[]I U  Substance(s) of Choice: alcohol   I[]I N/A  I[]I U  Substance(s) Used Currently/Recently: alcohol   I[]I N/A  I[]I U  Alcohol Consumption: 3-4 glasses of wine daily  I[]I N/A  I[]I U  Last Drink: about 1 week ago  I[x]I N/A  I[]I U  Last Drug Use:   I[]I N/A  I[]I U  Duration of Sobriety/Abstinence: 3 months total    I[x]I Y  I[]I N  I[]I U  Hx of Detox: only in detox one day as part of inpatient protocol before transitioned to rehab  I[x]I Y  I[]I N  I[]I U  Hx of Rehab:   I[]I Y  I[x]I N  I[]I U  Hx of IVDU:   I[]I Y  I[x]I N  I[]I U  Hx of Accidental Overdose:   I[]I Y  I[x]I N  I[]I U  Hx of DUI:   I[]I Y  I[x]I N  I[]I U  Hx of Complicated Withdrawal (i.e.  "Seizures and/or Delirium Tremens):   I[]I Y  I[x]I N  I[]I U  Hx of Known/Suspected Substance-Induced Psychiatric Disorder:   I[x]I Y  I[]I N  I[]I U  Hx of Medication Assisted Treatment:   I[x]I Y  I[]I N  I[]I U  Hx of Twelve Step Program (or Equivalent) Involvement:   I[]I Y  I[x]I N  I[]I U  Currently Exhibits Signs of Intoxication:   I[]I Y  I[x]I N  I[]I U  Currently Exhibits Signs of Withdrawal:   I[]I Y  I[x]I N  I[]I U  Currently Active in Recovery:   I[x]I Y  I[]I N  I[]I U  Social Support:   I[x]I Y  I[]I N  I[]I U  Spouse/Partner Consumption: Wife drinks no more than one drink at a time socially and has never had an issue with alcohol use.  There is actually in the home currently.    I[]I N/A  I[x]I Y  I[]I N  I[]I U  Acknowledges/Accepts Addiction:   I[]I N/A  I[x]I Y  I[]I N  I[]I U  Advised to Quit/Cut Back:   I[]I N/A  I[x]I Y  I[]I N  I[]I U  Alcohol/Drug Cessation ("Wants to Quit"): Patient Successfully Quit  I[]I N/A  I[x]I Y  I[]I N  I[]I U  Motivation to Pursue Treatment: Moderate, Pt would like to complete program based on insistence of wife and daughter  I[x]I N/A  I[]I Y  I[]I N  I[]I U  Tobacco Cessation ("Wants to Quit"):   I[]I N/A  I[x]I Y  I[]I N  I[]I U  I[]I A  Awareness of Biomedical Complications:   I[]I N/A  I[x]I Y  I[]I N  I[]I U  I[]I A  Understands Need for Lifetime Sobriety:     View/Acceptance of Twelve Step (or Equivalent) Programs: Somewhat reluctant, but agreeable    DSM-5-TR SUBSTANCE USE DISORDER CRITERIA:     -- Impaired Control:  I[x]I Y  I[]I N  I[]I U  I[]I A  I[]I D  Often take in larger amounts or over a longer period of time than was intended:   I[x]I Y  I[]I N  I[]I U  I[]I A  I[]I D  Persistent desire or unsuccessful efforts to cut down or control use:   I[]I Y  I[]I N  I[]I U  I[]I A  I[x]I D  Great deal of time spent in activities necessary to obtain substance, use, or recover from effects:   I[]I Y  I[]I N  I[]I U  I[]I A  I[x]I D  Craving/strong desire for " substance or urge to use:   -- Social Impairment:  I[]I Y  I[]I N  I[]I U  I[]I A  I[x]I D  Use resulting in failure to fulfill major role obligations at home, work or school:   I[x]I Y  I[]I N  I[]I U  I[]I A  I[]I D  Social, occupational, recreational activities decreased because of use:   I[]I Y  I[]I N  I[]I U  I[]I A  I[x]I D  Continued use despite having persistent or recurrent social or interpersonal problems caused or exacerbated by the substance:   -- Risky Use:  I[]I Y  I[]I N  I[]I U  I[]I A  I[x]I D  Recurrent use in situations in which it is physically hazardous:   I[x]I Y  I[]I N  I[]I U  I[]I A  I[]I D  Use despite physical or psychological problems that are likely to have been caused or exacerbated by the substance:   -- Neuroadaptation:  I[]I Y  I[]I N  I[]I U  I[]I A  I[x]I D  Tolerance, as defined by either of the following: (1) a need for markedly increased amounts of substance to achieve intoxication or desired effect.  -OR- (2) a markedly diminished effect with continued use of the same amount of substance:   I[]I Y  I[]I N  I[]I U  I[]I A  I[x]I D  Withdrawal, as manifested by either of the following: (1) the characteristic withdrawal syndrome for substance.  -OR- (2) substance is taken to relieve or avoid withdrawal symptoms:   -- Mild (1-3), Moderate (4-5), Severe (?6)    I[]I N/A  I[]I Y  I[]I N  I[]I U  I[]I A  I[x]I D  Active Substance Use Disorder:       HISTORY:     I[x]I Y  I[]I N  I[]I U  Psychiatric Diagnoses: depression and anxiety per chart review, pt denies   I[]I Y  I[x]I N  I[]I U  Current Psychiatric Provider (if Applicable):   I[]I Y  I[x]I N  I[]I U  Hx of Psychiatric Hospitalization:   I[]I Y  I[x]I N  I[]I U  Hx of Outpatient Psychiatric Treatment (psychiatry/psychotherapy):   I[x]I Y  I[]I N  I[]I U  Psychotropic Trials: Zoloft, trazodone, lunesta,   I[]I Y  I[x]I N  I[]I U  Prior Suicide Attempts:   I[]I Y  I[x]I N  I[]I U  Hx of Suicidal Ideation:   I[]I Y  I[x]I N  I[]I  U  Hx of Homicidal Ideation:   I[]I Y  I[x]I N  I[]I U  Hx of Self-Injurious Behavior (Non-Suicidal):   I[]I Y  I[x]I N  I[]I U  Hx of Violence:   I[]I Y  I[x]I N  I[]I U  Documented Hx of Malingering:     FAMILY HISTORY:  I[x]I Y  I[]I N  I[]I U    Sister who is 4 years younger than him has struggled with alcohol and drug abuse    I[]I Y  I[x]I N  I[]I U  Hx of Trauma/Neglect:   I[]I Y  I[x]I N  I[]I U  Hx of Physical Abuse:   I[]I Y  I[x]I N  I[]I U  Hx of Sexual Abuse:   I[x]I Y  I[]I N  I[]I U  Happy Childhood:   I[]I Y  I[x]I N  I[]I U  Significant Developmental Delay/Disability:   I[x]I Y  I[]I N  I[]I U  GED/High School Diploma or Beyond:   I[x]I Y  I[]I N  I[]I U  Currently Employed:   I[]I Y  I[x]I N  I[]I U  On or Applying for Disability:   I[x]I Y  I[]I N  I[]I U  Functions Independently:   I[x]I Y  I[]I N  I[]I U  Financially Stable:   I[x]I Y  I[]I N  I[]I U  Domiciled:   I[]I Y  I[x]I N  I[]I U  Lives Alone:   I[x]I Y  I[]I N  I[]I U  Intact Support System:   I[x]I Y  I[]I N  I[]I U  Heterosexual/Cisgender:   I[x]I Y  I[]I N  I[]I U  Currently in a Romantic Relationship:   I[x]I Y  I[]I N  I[]I U  Ever :   I[x]I Y  I[]I N  I[]I U  Children/Dependents: 3 children  I[]I Y  I[x]I N  I[]I U  Taoism/Spiritual:   I[]I Y  I[x]I N  I[]I U   History:   I[]I Y  I[x]I N  I[]I U  Current Legal Issues:   I[]I Y  I[x]I N  I[]I U  Past Charges/Convictions:   I[]I Y  I[x]I N  I[]I U  Hx of Incarceration:   I[]I Y  I[x]I N  I[]I U  Access to a Gun?:     I[]I Y  I[x]I N  I[]I U  Hx of Seizure:   I[x]I Y  I[]I N  I[]I U  Hx of Significant Head Injury (e.g., Loss of Consciousness, Concussion, Coma): Possible LOC prior to fall last year   I[x]I Y  I[]I N  I[]I U  Medical History & Diagnoses: aortic steonsis, osteopenia, HLD, HTN, bladder cancer     The patient's past medical history has been reviewed and updated as appropriate within the electronic medical record system.    * No active hospital problems.  "*   has Malignant neoplasm of overlapping sites of bladder and Neurological deficit present on their problem list.     Scheduled and PRN Medications: The electronic chart was reviewed and updated as appropriate.  See Medcard for details.      Allergies:  Patient has no known allergies.    PSYCHOSOCIAL FACTORS:  Stressors (Biopsychosocial, Cultural and Environmental): none identified  Functioning Relationships: good support system    STRENGTHS AND LIABILITIES:   Strength: Patient is intelligent.  Strength: Patient is physically healthy.  Strength: Patient is accepting of treatment.  Liability: Patient has a rigid personality structure.    Additional Relevant History, As Applicable:       EXAMINATION:     There were no vitals taken for this visit.    MENTAL STATUS EXAMINATION:  General Appearance: adequately groomed, appropriately dressed, in no apparent distress  Behavior: normal; cooperative; reasonably friendly, pleasant, and polite; appropriate eye-contact; under good behavioral control  Involuntary Movements and Motor Activity: no abnormal involuntary movements noted, no psychomotor agitation or retardation  Gait and Station:  intact, normal gait and station, ambulates without assistance  Speech and Language: conversational, spontaneous, speaks and understands English proficiently  Mood: "good"  Affect: reactive, mood congruent  Thought Process and Associations: linear and goal-directed, with no loosening of associations  Thought Content and Perceptions:no suicidal or homicidal ideation, no evidence of psychosis  Sensorium:intact; alert with clear sensorium; oriented fully to person, place, time and situation  Recent and Remote Memory:  grossly intact, no significant impairments noted   Attention and Concentration: attentive, not readily distractible, able to spell WORLD forwards and backwards   Fund of Knowledge:grossly intact, used appropriate vocabulary, no significant deficits noted 3/3 past " presidents  Insight: intact, demonstrates awareness of illness  Judgment: intact, behavior is adequate/appropriate given the circumstances      RISK MANAGEMENT:     I[]I Y  I[x]I N  I[]I U  I[]I A  Suicidal Ideation/Behavior: *  I[]I Y  I[x]I N  I[]I U  I[]I A  Homicidal Ideation/Behavior:   I[]I Y  I[x]I N  I[]I U  I[]I A  Violence:   I[]I Y  I[x]I N  I[]I U  I[]I A  Self-Injurious Behavior:     The patient is deemed to be a reliable and factually accurate historian.    I[x]I Y  I[]I N  I[]I U  I[]I A  I[]I N/A  Minimization of Risk Parameters Suspected/Evident: concern patient may be minimizing impact of alcohol and quantity of alcohol used  I[]I Y  I[x]I N  I[]I U  I[]I A  I[]I N/A  Exaggeration of Risk Parameters Suspected/Evident:       [] Y  [x] N  Danger to Self:   [] Y  [x] N  Danger to Others:   [] Y  [x] N  Grave Disability:     In cases of emergency, daily coverage provided by Acute/ER Psych MD, NP, PA, or SW, with contact numbers located in [facility] On Call Schedule.    Sanjana Murphy MD, PhD  [facility]  -4  05/07/2024        A diagnostic psychiatric evaluation was performed and responsiveness to treatment was assessed.  The patient demonstrates robust ability/capacity to respond to treatment.    KEY:     I[]I Y = Yes / Present / Endorses  I[]I N = No / Absent / Denies  I[]I U = Unknown / Unable to Assess / Unwilling to Participate  I[]I A = Ambiguity Exists / Accuracy Uncertain  I[]I D = Denial or Minimization is Suspected/Evident  I[]I N/A = Non-Applicable    CHART REVIEW:     Available documentation has been reviewed, and pertinent elements of the chart have been incorporated into this evaluation where appropriate.    LA/MS  AWARE  Site reviewed - No recent discrepancies or irregularities are noted.      ADVICE AND COUNSELING:     [x] In cases of emergencies (e.g. SI/HI resulting in danger to self or others, functioning deteriorates to the level of grave  disability), call 911 or [phone], or present to the emergency department for immediate assistance.  [x] Individuals should not operate a motor vehicle or heavy machinery if effects of medications or underlying symptoms/condition impair the ability to safely do so.    Alcohol, Tobacco, and Drug Counseling, as well as resources, has been provided, as warranted.     Shared medical decision making and informed consent are the hallmark and bedrock of good clinical care, and as such have been employed and obtained, respectively, to the degree possible.      Risk Mitigation Strategies, Harm Reduction Techniques, and Safety Netting are important interventions that can reduce acute and chronic risk, and as such have been employed to the degree possible.    Prescription Drug Management entails the review, recommendation, or consideration without recommendation of medications, and as such was employed during the encounter.    Additional Psychoeducation has been provided, as warranted.    Discussed, to the extent possible, diagnosis, risks and benefits of proposed treatment vs alternative treatments vs no treatment, potential side effects of these treatments and the inherent unpredictability of treatment. The patient expresses understanding of the above and displays the capacity to agree with this treatment given said understanding. Patient also agrees that, currently, the benefits outweigh the risks and consents to treatment at this time.     Written material has been provided to supplement, augment, and reinforce any discussions and interventions, via the AVS or other pre-printed handouts, as warranted.      DIAGNOSTIC TESTING:     The chart was reviewed for recent diagnostic procedures and investigations, and pertinent results are noted below.     Glu 144 (H)  1/14/2024  Li *   *  TSH 0.698  1/14/2024    HgA1c 5.4  9/7/2023  VPA *   *   FT4 0.82  1/14/2024    Na 144  1/14/2024  CLZ *   *  WBC 7.16  1/14/2024    Cr 0.9   4/10/2024  ANC 4.5; 63.3;   1/14/2024   Hgb 14.8  1/14/2024     BUN 12  1/14/2024  Trop I *   *  HCT 42.6  1/14/2024     GFR >60.0  4/10/2024   CPK *   *    1/14/2024     Alb 3.9  1/14/2024   PRL *   *  B12 *   *     T Bili 0.5  1/14/2024  Chol 134  1/14/2024  B9 *   *    ALP 91  1/14/2024  TGs 144  1/14/2024  B1 *   *    AST 38  1/14/2024  HDL 76 (H)  1/14/2024  Vit D *   *     ALT 26  1/14/2024  LDL 29.2 (L)  1/14/2024  HIV Non-reactive  1/14/2024     INR 1.0  1/14/2024  Flor *   *   Hep C Non-reactive  1/14/2024    GGT *   *  Lip *   *  RPR *   *    MCV 95  1/14/2024   NH4 *   *  UPT *   *      PETH *   *  THC *   *    ETOH 256 (H)  1/14/2024  DINOI *   *    EtG *   *  AMP *   *    ALC *   *  OPI *   *    BZO *   *  MTD *   *     BAR *   *  BUP *   *    PCP *   *  FEN *   *     Results for orders placed or performed during the hospital encounter of 01/14/24   ECG 12 lead    Collection Time: 01/14/24 11:18 AM    Narrative    Test Reason : R29.818,    Vent. Rate : 083 BPM     Atrial Rate : 083 BPM     P-R Int : 208 ms          QRS Dur : 076 ms      QT Int : 382 ms       P-R-T Axes : 074 -66 074 degrees     QTc Int : 448 ms    Sinus rhythm with marked sinus arrythmia  Left axis deviation  Pulmonary disease pattern  Inferior infarct ,age undetermined  Abnormal ECG  No previous ECGs available  Confirmed by Devin LEMUS MD ([id number]) on 1/14/2024 9:39:57 PM    Referred By: [email or web address]   SELF           Confirmed By:Devin LEMUS MD       Results for orders placed or performed during the hospital encounter of 01/14/24   MRI Brain Without Contrast    Narrative    EXAMINATION:  MRI BRAIN WITHOUT CONTRAST    CLINICAL HISTORY:  Stroke, follow up;    TECHNIQUE:  Multiplanar multisequence MR imaging of the brain was performed without contrast.    COMPARISON:  CTA head neck performed 01/14/2024    FINDINGS:  Parenchyma: There is no restricted diffusion to suggest acute or subacute ischemic  infarct.Generalized pattern age-related parenchymal volume loss.  Nonspecific areas of T2/FLAIR hyperintense signal in the frontoparietal white matter would be in keeping with sequela of chronic small vessel ischemic disease.Tiny remote infarcts in the right cerebellum.    Additional comments: There is no midline shift, abnormal extra-axial fluid collection, or acute intracranial hemorrhage. The basal cisterns are patent.    Ventricles: Normal.    Flow voids: The normal major intracranial arterial flow voids are visualized.    Sinuses and mastoid air cells: Scattered modest paranasal sinus mucosal thickening with possible small retention cysts.  Nonspecific partial fluid signal opacification of the right mastoid air cells, possibly the basis of effusion and/or mucosal thickening.    Orbits: Normal    Midline structures: The pituitary and craniocervical junction are normal.    Marrow: Normal        Impression    No acute intracranial findings.  No evidence of acute ischemia or recent infarction, as questioned.      Electronically signed by: Reji Pool  Date:    01/14/2024  Time:    13:45

## 2024-05-07 NOTE — PLAN OF CARE
05/07/24 1410   Activity/Group Therapy Checklist   Group Other (Comments)  (Processing Group)   Attendance Attended   Follows Direction Followed directions   Group Interactions/Observations Interacted appropriately;Alert;Sharing;Supportive   Affect/Mood Range Normal range   Affect/Mood Display Appropriate   Goal Progression Progressing

## 2024-05-07 NOTE — PLAN OF CARE
05/07/24 1100   Activity/Group Therapy Checklist   Group Addiction Education   Attendance Attended   Follows Direction Followed directions   Group Interactions/Observations Interacted appropriately   Affect/Mood Range Normal range   Affect/Mood Display Appropriate   Goal Progression Progressing

## 2024-05-07 NOTE — TREATMENT PLAN
[facility]  ADDICTIVE BEHAVIOR UNIT  INTERDISCIPLINARY TREATMENT PLAN    INTERDISCIPLINARY  TREATMENT TEAM:    Jethro Kiser M.D., Psychiatrist      Rasheed Goins LPN, Nurse    Shelby Felix, [facility],     Matty Johns, [facility],     Jennifer Black , LMSW, Licensed Master Social Worker    Rebecca Andersen, W,     Resident: Sanjana Murphy MD      Signatures scanned into record separately.      ESTIMATED LOS:  4-6 weeks        The patient has reviewed the treatment plan with staff and has signed the Patient Responsibilities form.  Patient signature scanned into record separately        Dr. Jethro Kiser certifies that the patient would require inpatient psychiatric care if the Partial Hospitalization services were not provided, and services will be furnished under the care of a physician, and under a written Plan of Treatment.    Jethro Kiser M.D., Psychiatrist - Signature scanned into record separately.      TREATMENT PLAN    DIAGNOSIS:    Alcohol use disorder   Anxiety, unspecified       Patient/Family Education Needs/Barriers to Learning (i.e., Language, Reading, Comprehension): None         Support/Advocacy Services/Needs (i.e., Financial, Transportation, Medications): None       Community Resources (i.e., Alcoholics Anonymous, Al Anon, Cocaine Anonymous, Narcotics Anonymous): Pt was provided with AA/ NA sources within the community.         Patient Goals:  Sobriety    Current Coping Skills:   Remain Calm     Strengths:    Work  2.   Success    Limitations:  Myself       Goals and Objectives:  1. Goal:  Abstain from alcohol and illicit drugs   Objective measure: Negative breathalyzer, negative urine screens   Time frame to reach goal: By discharge    2  Goal: Attend daily 12-step meetings   Objective measure: Signed attendance sheet daily   Time frame to reach goal: Each day    3. Goal: Participate in group sessions    Objective measure: Progress notes  indicating active listening, self-disclosure, feedback   Time frame to reach goal: Each day    4. Goal: Obtain a 12-step sponsor   Objective measure: self-report   Time frame to reach goal: By discharge    5. Goal: Complete Life Story   Objective measure: Share story with group   Time frame to reach goal: Within first two weeks of treatment    6. Goal: Complete First    Objective measure: Share 1st step with group   Time frame to reach goal:  By discharge    7. Goal: Complete Relapse Prevention Plan   Objective measure: Share plan with group   Time frame to reach goal: By discharge    8. Goal: Complete detoxification   Objective measure: Physician progress note indicating detoxification is complete   Time frame to reach goal: Two weeks    9.  Goal: Family involvement/participation   Objective measure: Family session documented in progress notes   Time frame to reach goal: By discharge    10. Goal:  Reduce depression   Objective measure: Physician progress note indicating depression is improved   Time frame to reach goal: By discharge    11.  Goal: Reduce anxiety   Objective measure: Physician progress note indicating anxiety is improved   Time frame to reach goal: By discharge    Group Interventions:    Psychodynamic Group Psychotherapy  1 hour, five times per week  Goals: 1. Utilize group empathy and support for problem solving; 2. Apply stress management, communication, and assertive skills to personal issues; 3. Discuss negative consequences of addictive behavior; 4. Discuss ways to change lifestyle to support sobriety; 5. Discuss addiction history    Addiction Education Group  1 hour, 4 times per week  Goals:  1. Verbalize increased knowledge of the process of recovery; 2. Understand basic concepts of addiction (denial, powerlessness, unmanageabiltiy, etc.); 3. Develop a consistent, positive image of self; 4. Identify personal values that may aid in recovery    Steps to Recovery Group  1 hour, 5 times per  week  Goals:  1. Learn 12 steps; 2. Identify ways to incorporate 12 step principles into daily life; 3. Complete first step; 4. Verbalize knowledge and understanding of the concept of a higher power    Relapse Prevention Group  1 hour, 2 times per week  Goals: 1. Verbalize increased knowledge of chemical dependence and the process of recovery; 2. Verbally identify specific behaviors, attitudes, and feelings that may lead to relapse, focusing on triggers; 3. Identify behavior patterns that will need to be changed to maintain sobriety, including people and places that must be avoided; 4. Identify specific strategies to address relevant identified relapse triggers; 5. Identify positive rewards associated with abstinence    Living Sober Group  1 hour, 2 times per week  Goals:  1. Reflect upon events of day/weekend, focusing on positive change; 2.  Discuss dynamics of 12 step meetings attended; 3. Discuss topics from book Living Sober     Stress Management Skills Group  1 hour, 3 times per week  Goals: 1. Identify types and levels of stress; 2. Identify and change maladaptive beliefs and behaviors; 3. Identify and practice techniques of stress management    Disease Concept Group  1 hour, 1 time per week  Goals: 1. Verbalize an understanding of the disease concept of addiction; 2. Increase familys understanding of the disease concept of addiction    Communication Skills Group  1 hour, 2 times per week  Goals: 1. Learn rules of effective communication; 2. Improve listening skills; 3. Practice clear communication    Promoting Healthy Lifestyles Group  1 hour, 1 time per week  Goals:  1. Understand the biopsychosocial model of health; 2. Develop insight into how substance abuse/dependency can impact dimensions of health; 3. Develop appropriate health promotion strategies    Relationship Dynamics Group  1 hour, 1 time per week  Goals:  1. Learn about factors that shape relationships; 2. Understand the central role of  relationships in personal well-being; 3. Learn how to improve all relationships    Medical Complications Group  1 hour, 1 time per week  Goals:  1.  Increase knowledge of how addiction negatively affects the body; 2. Increase awareness of how abstinence can positively impact health     Mental Hygiene Group  1 hour, 1 time per week  Goals:  1. Increase healthy habits of relaxation, exercise, and nutrition; 2. Discuss strategies for coping with depression and anxiety    Daily Reflections Group  ½ hour, 5 times per week  Goals:  1. Independently journal events and emotions; 2. Independently consider positive concepts of recovery on a daily basis    Check-In/Weekend Process  1 hour, 1 time per week  Goals:  1. Identification of situations/needs that may have arisen over the weekend.  2. Identification of goals for the week.  3. Emotional, physical, spiritual, and social check-in to begin group.

## 2024-05-08 ENCOUNTER — HOSPITAL ENCOUNTER (OUTPATIENT)
Dept: PSYCHIATRY | Facility: HOSPITAL | Age: 79
Discharge: HOME | End: 2024-05-08
Attending: PSYCHIATRY & NEUROLOGY
Payer: MEDICARE

## 2024-05-08 VITALS
SYSTOLIC BLOOD PRESSURE: 130 MMHG | HEART RATE: 67 BPM | TEMPERATURE: 98 F | DIASTOLIC BLOOD PRESSURE: 63 MMHG | RESPIRATION RATE: 18 BRPM

## 2024-05-08 DIAGNOSIS — F10.20 ALCOHOL USE DISORDER, MODERATE, DEPENDENCE: Primary | ICD-10-CM

## 2024-05-08 DIAGNOSIS — Z79.899 LONG-TERM USE OF HIGH-RISK MEDICATION: ICD-10-CM

## 2024-05-08 DIAGNOSIS — F41.1 GAD (GENERALIZED ANXIETY DISORDER): ICD-10-CM

## 2024-05-08 LAB
AMPHET+METHAMPHET UR QL: NEGATIVE
BARBITURATES UR QL SCN>200 NG/ML: NEGATIVE
BENZODIAZ UR QL SCN>200 NG/ML: NEGATIVE
BZE UR QL SCN: NEGATIVE
CANNABINOIDS UR QL SCN: NEGATIVE
CREAT UR-MCNC: 91 MG/DL (ref 23–375)
ETHANOL UR-MCNC: <10 MG/DL
METHADONE UR QL SCN>300 NG/ML: NEGATIVE
OPIATES UR QL SCN: NEGATIVE
PCP UR QL SCN>25 NG/ML: NEGATIVE
TOXICOLOGY INFORMATION: NORMAL

## 2024-05-08 PROCEDURE — 90853 GROUP PSYCHOTHERAPY: CPT | Performed by: SOCIAL WORKER

## 2024-05-08 PROCEDURE — 80307 DRUG TEST PRSMV CHEM ANLYZR: CPT | Performed by: PSYCHIATRY & NEUROLOGY

## 2024-05-08 PROCEDURE — 90853 GROUP PSYCHOTHERAPY: CPT | Mod: ,,,

## 2024-05-08 PROCEDURE — 90853 GROUP PSYCHOTHERAPY: CPT | Mod: ,,, | Performed by: PSYCHOLOGIST

## 2024-05-08 NOTE — PLAN OF CARE
05/08/24 1300   Activity/Group Therapy Checklist   Group Goals/Reflection   Attendance Attended   Follows Direction Followed directions   Group Interactions/Observations Interacted appropriately   Affect/Mood Range Normal range   Affect/Mood Display Appropriate   Goal Progression Progressing

## 2024-05-08 NOTE — PROGRESS NOTES
FOLLOW UP VISIT: [facility]  DATE OF ADMISSION: 5/7/24    ASSESSMENT AND PLAN:     DIAGNOSES & PROBLEMS:  Alcohol use disorder, severe  LINDSAY    In Summary:  Pt is a 79yo male presenting to OR today to continue treatment for alcohol use disorder.  Pt had presentation to ED in January 2024 which was found to be alcohol intoxication.  Since that event, pt went to inpatient detox/rehab at the insistence of his wife and daughter.  Pt reports relapse since discharge of a few drinks, but denies persistent or daily alcohol use.  Last drink was reportedly about a week ago.  He feels that his current medications have been very helpful and denies medication side effects.  Pt is motivated to be in the program at this time due to the insistence of his wife and daughter.       Plan:  -Ctn gabapentin 300mg bid  -Ctn Zoloft 100mg daily  -Ctn naltrexone 50mg daily        - continue ORP and program protocol; while in program will continue to monitor routine VS, breathalyzer and alcohol/drug screenings  - continue MAT as prescribed  - monitor random drug/alcohol screening  - routine monitoring of PETH levels to assess for maintenance of sobriety  - counseled on full abstinence from alcohol and substances of abuse (illicit and prescription)  - full engagement in 12 step (or equivalent) recovery program(s), including meeting attendance and acquisition/maintenance of sponsor     INTERVAL HISTORY AND ROS:     Pt presents on time for day 2 of ORP.  Discussed his previous experiences with 12 step and rehab programs. Discussed his progress post-discharge including 'falling off the wagon' leading to him presenting here.  Discussed his participations in groups, Matty's group specifically.  He appreciates his straight forward' style. He denies cravings.  He identifies situations when he is by himself where he is more likely to drink. Denies feeling the needs to drink in social settings.  He identified family as a major factor in  him seeking sobriety. He is in agreement with them on the need for sobriety and abstinence.  His goal this week is to pick an AA meeting location and attend.   He is unsure about granting us permission to reach out to his wife for collateral.  He will think about it.        WITHDRAWAL SYMPTOMS: no  CRAVINGS: no    CURRENT PSYCHOTROPIC REGIMEN:  Zoloft 100mg daily  Gabapentin 300mg BID  Naltrexone 50mg daily      PERTINENT PAST HISTORY AND CHART REVIEW:   Per Chart:  Pt with presentation January 2024 to ED for slurred speech.  Stroke workup was negative and patient was found to have an EtOH level of 256.  Pt had driven from [place] himself for the evaluation.  Per chart review, his wife said that they were on a cruise recently a couple of weeks ago and said that he had been hiding bottles of alcohol from her. She thought that he had stopped drinking.      Per Patient:  Pt says since the pandemic he has been drinking much more heavily.  Alcohol use has increased since then.  IN September he fell and broke his elbow when on the golf [place].  Pt was untruthful with his wife who was concerned about his alcohol use around November or December.  In January he had a drink that morning and was very concerned he was having a seizure or something bad.  He was evaluated in the ED and found to have alcohol intoxication.  Daughter and wife insisted he needed inpatient detox.  He went to a facility in [place], [place] for 4 weeks mid January to mid February.  When coming home from the program, he started drinking some again.  Last drink was 1 week ago.  He did a couple of shots at that time, but didn't keep drinking because he didn't find it pleasurable.  Pt is on naltrexone and finds it to be helpful.  Gabapentin was also started while in rehab.  Since cutting back and recently stopping drinking, he feels physically much better.  He also sleeps well since he stopped.  When drinking at his heaviest, states he was drinking 3-4  glasses of wine once daily every day.  Denies having withdrawal symptoms when stopping.  Denies cravings.  He is not currently in a 12 step program.  Pt is in the ORP at the insistence of his wife and daughter, who feels he should have more support.        Pt taking Zoloft 100mg daily.  This was originally started by his PCP after fracture of his elbow.  Pt says that his daughter specifically asked for him to be on Zoloft and he isn't sure why it was started.  He feels that helps him sleep because his mind isn't thinking as much when he tries to sleep.  Denies ever feeling sad/depressed or anxiety. Denies any side effects to current medications.       EXAMINATION:     There were no vitals taken for this visit.    MENTAL STATUS EXAMINATION:  General Appearance & Behavior: **  adequately groomed, appropriately dressed, in no apparent distress, under good behavioral control  Involuntary Movements and Motor Activity: **  no abnormal involuntary movements noted, no psychomotor agitation or retardation  Speech & Language: **  conversational, spontaneous, speaks and understands English proficiently  Mood: neutral  Affect: **  normal, euthymic, reactive, full-range, mood-congruent, appropriate to situation and context  Thought Process & Associations: **  linear and goal-directed, with no loosening of associations  Thought Content & Perceptions: **  no suicidal or homicidal ideation, no evidence of psychosis  Sensorium and Cognition: **  grossly intact, no significant deficits noted  Insight & Judgment: **  intact, demonstrates awareness of illness and adequate/appropriate behavior given the circumstances      RISK MANAGEMENT:     I[]I Y  I[x]I N  I[]I U  I[]I A  Suicidal Ideation/Behavior: **   I[]I Y  I[x]I N  I[]I U  I[]I A  Homicidal Ideation/Behavior: **  I[]I Y  I[x]I N  I[]I U  I[]I A  Violence: **  I[]I Y  I[x]I N  I[]I U  I[]I A  Self-Injurious Behavior: **    The patient is deemed to be a reliable and factually  accurate historian.    I[]I Y  I[x]I N  I[]I U  I[]I A  I[]I N/A  Minimization of Risk Parameters Suspected/Evident: **  I[]I Y  I[x]I N  I[]I U  I[]I A  I[]I N/A  Exaggeration of Risk Parameters Suspected/Evident: **      [] Y  [x] N  Danger to Self:   [] Y  [x] N  Danger to Others:   [] Y  [x] N  Grave Disability:     The patient was evaluated for psychiatric admission and found not to meet the criteria at this time.  The patient can be safely and effectively managed in a less restrictive level of care.    In cases of emergency, daily coverage provided by Acute/ER Psych MD, NP, PA, or SW, with contact numbers located in [facility] On Call Schedule.    Ronaldo Schultz MD  Department of Psychiatry  [facility]      KEY:     I[]I Y = Yes / Present / Endorses  I[]I N = No / Absent / Denies  I[]I U = Unknown / Unable to Assess / Unwilling to Participate  I[]I A = Ambiguity Exists / Accuracy Uncertain  I[]I D = Denial or Minimization is Suspected/Evident  I[]I N/A = Non-Applicable    CHART REVIEW:     Available documentation has been reviewed, and pertinent elements of the chart - including previous psychiatric evaluations - have been incorporated into this evaluation where appropriate.    ADVICE AND COUNSELING:     [x] In cases of emergencies (e.g. SI/HI resulting in danger to self or others, functioning deteriorates to the level of grave disability), call 911 or [phone], or present to the emergency department for immediate assistance.  [x] Individuals should not operate a motor vehicle or heavy machinery if effects of medications or underlying symptoms/condition impair the ability to safely do so.    Alcohol, Tobacco, and Drug Counseling, as well as resources, has been provided, as warranted.     Shared medical decision making and informed consent are the hallmark and bedrock of good clinical care, and as such have been employed and obtained, respectively, to the degree possible.      Risk  Mitigation Strategies, Harm Reduction Techniques, and Safety Netting are important interventions that can reduce acute and chronic risk, and as such have been employed to the degree possible.    Prescription Drug Management entails the review, recommendation, or consideration without recommendation of medications, and as such was employed during the encounter.    Additional Psychoeducation has been provided, as warranted.    Discussed, to the extent possible, diagnosis, risks and benefits of proposed treatment vs alternative treatments vs no treatment, potential side effects of these treatments and the inherent unpredictability of treatment. The patient expresses understanding of the above and displays the capacity to agree with this treatment given said understanding. Patient also agrees that, currently, the benefits outweigh the risks and consents to treatment at this time.     Written material has been provided to supplement, augment, and reinforce any discussions and interventions, via the AVS or other pre-printed handouts, as warranted.      DIAGNOSTIC TESTING:     The chart was reviewed for recent diagnostic procedures and investigations, and pertinent results are noted below.     Glu 94  5/7/2024  Li *   *  TSH 0.698  1/14/2024    HgA1c 5.4  9/7/2023  VPA *   *   FT4 0.82  1/14/2024    Na 140  5/7/2024  CLZ *   *  WBC 7.16  1/14/2024    Cr 0.9  5/7/2024  ANC 4.5; 63.3;   1/14/2024   Hgb 14.8  1/14/2024     BUN 19  5/7/2024  Trop I *   *  HCT 42.6  1/14/2024     GFR >60.0  5/7/2024   CPK *   *    1/14/2024     Alb 4.5  5/7/2024   PRL *   *  B12 *   *     T Bili 1.4 (H)  5/7/2024  Chol 134  1/14/2024  B9 *   *    ALP 57  5/7/2024  TGs 144  1/14/2024  B1 *   *    AST 19  5/7/2024  HDL 76 (H)  1/14/2024  Vit D *   *     ALT 15  5/7/2024  LDL 29.2 (L)  1/14/2024  HIV Non-reactive  1/14/2024     INR 1.0  1/14/2024  Flor *   *   Hep C Non-reactive  1/14/2024    GGT *   *   Lip *   *  RPR *   *    MCV 95  1/14/2024   NH4 *   *  UPT *   *      PETH *   *  THC *   *    ETOH 256 (H)  1/14/2024  DIONI *   *    EtG *   *  AMP *   *    ALC *   *  OPI *   *    BZO *   *  MTD *   *     BAR *   *  BUP *   *    PCP *   *  FEN *   *

## 2024-05-08 NOTE — PROGRESS NOTES
Group Psychotherapy (PhD/LCSW)     Site: [facility]     Clinical status of patient: Intensive Outpatient Program (IOP)     Date: 05/08/2024      Group Focus: Mindfulness     Length of service: 68923 - 45-60 minutes     Number of patients in attendance: 9     Referred by: [facility]      Target symptoms: Substance Abuse     Patient's response to treatment: Active Listening, and Self-Disclosure      Progress toward goals: Progressing adequately     Interval History:   Session focus was Mindfulness: Mindfulness 'What' Skills. Patient's were introduced to mindfulness what skills of observing, describing, participating. Patient's identified the value of each skill, how to use each skill, and practiced each skill in session.   Diagnosis:     ICD-10-CM ICD-9-CM   1. Alcohol use disorder, moderate, dependence  F10.20 303.90   2. LINDSAY (generalized anxiety disorder)  F41.1 300.02            Plan: Continue treatment on ORP

## 2024-05-08 NOTE — PROGRESS NOTES
"Collateral    Lisa Chisholm, daughter  [phone]    Since coming home from inpatient in [place], she feels like he underestimated recovery.  Pt has always been a "very functional alcoholic".  She doesn't think that he is drinking right now.  She believes he is currently sober and committed to remain sober. She hopes we can discuss with him the context of why he drinks.  She feels that he drinks out of boredom.  She feels that he has been drinking excessively over 20 years, maybe 50 years.  She feels that his overall attitude has changed.  Since losing a close friend about a year ago, she feels like he has been somewhat down.  Ever since then, mood has seemed down.  Appetite fluctuates based on when he is drinking alcohol.  When not drinking, sleep and energy are good.  He is introverted and reserved. He enjoys work and spending time with his children.  No anhedonia, guilt, hopelessness, SI, or difficulty concentrating.          She denies anxiety/excessive worrying.        Sanjana Murphy MD, PhD  [facility]  -4  05/08/2024     "

## 2024-05-08 NOTE — PROGRESS NOTES
Group Psychotherapy (PhD/LCSW)     Site: [facility]     Clinical status of patient: Intensive Outpatient Program (IOP)     Date: 05/08/2024       Group Focus: Assertive Communication     Length of service: 94259 - 45-60 minutes     Number of patients in attendance: 8     Referred by: [facility]      Target symptoms: Anxiety and Substance Use     Patient's response to treatment: Active Listening and Self-Disclosure     Progress toward goals: Progressing adequately     Interval History: Participants reviewed Personal Bill of Rights and reflected on rights that stood out to them as particularly meaningful or important to bear in mind. Participants reviewed characteristics of and distinctions among passive, aggressive, passive aggressive, and assertive communication. Participants discussed strategies for fostering assertive communication and practiced crafting assertive messages in response to posed situations.      Diagnosis:    ICD-10-CM ICD-9-CM   1. Alcohol use disorder, moderate, dependence  F10.20 303.90   2. LINDSAY (generalized anxiety disorder)  F41.1 300.02   3. Long-term use of high-risk medication  Z79.899 V58.69      Plan: Complete treatment on ORP

## 2024-05-08 NOTE — PROGRESS NOTES
Group Psychotherapy (PhD/LCSW)     Site: [facility]     Clinical status of patient: Intensive Outpatient Program (IOP)     Date: 05/07/2024      Group Focus: Interpersonal Effectiveness     Length of service: 27217 - 45-60 minutes     Number of patients in attendance: 11     Referred by: [facility]      Target symptoms: Substance Abuse     Patient's response to treatment: Active Listening, and Self-Disclosure      Progress toward goals: Progressing adequately     Interval History: Session focus was Interpersonal Effectiveness:  GIVE and FAST.  Patients were introduced to skills to promote active listening, self-respect, and attendance to values. Patients were provided with opportunities to collaborate with others and role-play in session     Diagnosis:     ICD-10-CM ICD-9-CM   1. Alcohol use disorder  F10.90 V49.89   2. LINDSAY (generalized anxiety disorder)  F41.1 300.02         Plan: Continue treatment on ORP

## 2024-05-09 ENCOUNTER — HOSPITAL ENCOUNTER (OUTPATIENT)
Dept: PSYCHIATRY | Facility: HOSPITAL | Age: 79
Discharge: HOME | End: 2024-05-09
Attending: PSYCHIATRY & NEUROLOGY
Payer: MEDICARE

## 2024-05-09 DIAGNOSIS — F41.1 GAD (GENERALIZED ANXIETY DISORDER): ICD-10-CM

## 2024-05-09 DIAGNOSIS — F10.20 ALCOHOL USE DISORDER, MODERATE, DEPENDENCE: Primary | ICD-10-CM

## 2024-05-09 LAB
CLINICAL BIOCHEMIST REVIEW: NORMAL
PLPETH BLD-MCNC: 60 NG/ML
POPETH BLD-MCNC: 77 NG/ML

## 2024-05-09 PROCEDURE — 90853 GROUP PSYCHOTHERAPY: CPT | Performed by: SOCIAL WORKER

## 2024-05-09 PROCEDURE — 90853 GROUP PSYCHOTHERAPY: CPT

## 2024-05-09 NOTE — PLAN OF CARE
05/09/24 1444   Activity/Group Therapy Checklist   Group Other (Comments)  (Creative therapy)   Attendance Attended   Follows Direction Followed directions   Group Interactions/Observations Interacted appropriately;Alert;Sharing;Supportive   Affect/Mood Range Normal range   Affect/Mood Display Appropriate   Goal Progression Progressing      facilitated creative group session with patients.  SW discused the concept of building happiness with the Heart Map activity.  SW asked pts to identify people places, things and others that contributed to their happines and lived deep inside of their heart and to be descriptive as possible. This exercise allowed pts to reflect on siginificant memories small and big and ways to consider how to sustain happiness.

## 2024-05-09 NOTE — PLAN OF CARE
05/09/24 1517   Activity/Group Therapy Checklist   Group Relapse Prevention   Attendance Attended   Follows Direction Followed directions   Group Interactions/Observations Interacted appropriately;Sharing;Supportive;Alert   Affect/Mood Range Normal range   Affect/Mood Display Appropriate   Goal Progression Progressing

## 2024-05-09 NOTE — PROGRESS NOTES
Group Psychotherapy (PhD/LCSW)     Site: [facility]     Clinical status of patient: Intensive Outpatient Program (IOP)     Date: 05/09/2024      Group Focus: Emotion Regulation     Length of service: 64657 - 45-60 minutes     Number of patients in attendance: 8     Referred by: [facility]      Target symptoms: Substance Abuse     Patient's response to treatment: Active Listening, and Self-Disclosure      Progress toward goals: Progressing adequately     Interval History:   Session focus was Emotion Regulation:  ABC PLEASE.  Patients were encouraged to reduce vulnerability to distress by accumulating positive emotions, building mastery, coping ahead, and by attending to physical well-being.  Each patient identified a way to accumulate positive emotions and build mastery.  Diagnosis:     ICD-10-CM ICD-9-CM   1. Alcohol use disorder, moderate, dependence  F10.20 303.90   2. LINDSAY (generalized anxiety disorder)  F41.1 300.02            Plan: Continue treatment on ORP

## 2024-05-09 NOTE — PLAN OF CARE
05/09/24 1400   Activity/Group Therapy Checklist   Group Relapse Prevention   Attendance Attended   Follows Direction Followed directions   Group Interactions/Observations Interacted appropriately   Affect/Mood Range Normal range   Affect/Mood Display Appropriate   Goal Progression Progressing

## 2024-05-10 ENCOUNTER — HOSPITAL ENCOUNTER (OUTPATIENT)
Dept: PSYCHIATRY | Facility: HOSPITAL | Age: 79
Discharge: HOME | End: 2024-05-10
Attending: PSYCHIATRY & NEUROLOGY
Payer: MEDICARE

## 2024-05-10 VITALS
RESPIRATION RATE: 18 BRPM | DIASTOLIC BLOOD PRESSURE: 59 MMHG | TEMPERATURE: 97 F | HEART RATE: 71 BPM | SYSTOLIC BLOOD PRESSURE: 111 MMHG

## 2024-05-10 DIAGNOSIS — Z79.899 LONG-TERM USE OF HIGH-RISK MEDICATION: Primary | ICD-10-CM

## 2024-05-10 DIAGNOSIS — Z79.899 LONG-TERM USE OF HIGH-RISK MEDICATION: ICD-10-CM

## 2024-05-10 LAB — ETHYL GLUCURONIDE: NEGATIVE NG/ML

## 2024-05-10 PROCEDURE — 90853 GROUP PSYCHOTHERAPY: CPT

## 2024-05-10 PROCEDURE — 80307 DRUG TEST PRSMV CHEM ANLYZR: CPT | Performed by: PSYCHIATRY & NEUROLOGY

## 2024-05-10 PROCEDURE — 80307 DRUG TEST PRSMV CHEM ANLYZR: CPT | Mod: 59 | Performed by: PSYCHIATRY & NEUROLOGY

## 2024-05-10 PROCEDURE — 99232 SBSQ HOSP IP/OBS MODERATE 35: CPT | Mod: ,,, | Performed by: PSYCHIATRY & NEUROLOGY

## 2024-05-10 PROCEDURE — 90853 GROUP PSYCHOTHERAPY: CPT | Performed by: SOCIAL WORKER

## 2024-05-10 NOTE — PLAN OF CARE
05/10/24 1412   Activity/Group Therapy Checklist   Group Meditation/Relaxation   Attendance Attended   Follows Direction Followed directions   Group Interactions/Observations Interacted appropriately;Sharing;Supportive;Alert   Affect/Mood Range Normal range   Affect/Mood Display Appropriate   Goal Progression Progressing

## 2024-05-10 NOTE — PROGRESS NOTES
FOLLOW UP VISIT: [facility]  DATE OF ADMISSION: 5/7/24    ASSESSMENT AND PLAN:     DIAGNOSES & PROBLEMS:  Alcohol use disorder, severe  LINDSAY    In Summary:  Pt is a 77yo male presenting to ORP today to continue treatment for alcohol use disorder.  Pt had presentation to ED in January 2024 which was found to be alcohol intoxication.  Since that event, pt went to inpatient detox/rehab at the insistence of his wife and daughter.  Pt reports relapse since discharge of a few drinks, but denies persistent or daily alcohol use.  Last drink was reportedly about a week ago.  He feels that his current medications have been very helpful and denies medication side effects.  Pt is motivated to be in the program at this time due to the insistence of his wife and daughter.       Plan:  -Ctn gabapentin 300mg bid  -Ctn Zoloft 100mg daily  -Ctn naltrexone 50mg daily        - continue ORP and program protocol; while in program will continue to monitor routine VS, breathalyzer and alcohol/drug screenings  - continue MAT as prescribed  - monitor random drug/alcohol screening  - routine monitoring of PETH levels to assess for maintenance of sobriety  - counseled on full abstinence from alcohol and substances of abuse (illicit and prescription)  - full engagement in 12 step (or equivalent) recovery program(s), including meeting attendance and acquisition/maintenance of sponsor     INTERVAL HISTORY AND ROS:     Overall, pt states things are going well.  No relapses or cravings.  He was surprised that his PETH came back at 77 and states he didn't realize he was drinking that much.  He has not been to any AA meetings yet, but plans to attend some virtual meetings this weekend.       He feels that current medications are helpful and ideally would like to be on the least amount of medication possible.  He discusses possibly stopping naltrexone, but after further discussion is agreeable to continuing the medication at this time as  he finds it helpful.  Denies medication side effects.         WITHDRAWAL SYMPTOMS: no  CRAVINGS: no    CURRENT PSYCHOTROPIC REGIMEN:  Zoloft 100mg daily  Gabapentin 300mg BID  Naltrexone 50mg daily      PERTINENT PAST HISTORY AND CHART REVIEW:   Per Chart:  Pt with presentation January 2024 to ED for slurred speech.  Stroke workup was negative and patient was found to have an EtOH level of 256.  Pt had driven from [place] himself for the evaluation.  Per chart review, his wife said that they were on a cruise recently a couple of weeks ago and said that he had been hiding bottles of alcohol from her. She thought that he had stopped drinking.      Per Patient:  Pt says since the pandemic he has been drinking much more heavily.  Alcohol use has increased since then.  IN September he fell and broke his elbow when [place] the [place].  Pt was untruthful with his wife who was concerned about his alcohol use around November or December.  In January he had a drink that morning and was very concerned he was having a seizure or something bad.  He was evaluated in the ED and found to have alcohol intoxication.  Daughter and wife insisted he needed inpatient detox.  He went to a facility in [place], [place] for 4 weeks mid January to mid February.  When coming home from the program, he started drinking some again.  Last drink was 1 week ago.  He did a couple of shots at that time, but didn't keep drinking because he didn't find it pleasurable.  Pt is on naltrexone and finds it to be helpful.  Gabapentin was also started while in rehab.  Since cutting back and recently stopping drinking, he feels physically much better.  He also sleeps well since he stopped.  When drinking at his heaviest, states he was drinking 3-4 glasses of wine once daily every day.  Denies having withdrawal symptoms when stopping.  Denies cravings.  He is not currently in a 12 step program.  Pt is in the ORP at the insistence of his wife and daughter, who  feels he should have more support.        Pt taking Zoloft 100mg daily.  This was originally started by his PCP after fracture of his elbow.  Pt says that his daughter specifically asked for him to be on Zoloft and he isn't sure why it was started.  He feels that helps him sleep because his mind isn't thinking as much when he tries to sleep.  Denies ever feeling sad/depressed or anxiety. Denies any side effects to current medications.       EXAMINATION:     There were no vitals taken for this visit.    MENTAL STATUS EXAMINATION:  General Appearance & Behavior:  adequately groomed, appropriately dressed, in no apparent distress, under good behavioral control  Involuntary Movements and Motor Activity:  no abnormal involuntary movements noted, no psychomotor agitation or retardation  Speech & Language: conversational, spontaneous, speaks and understands English proficiently  Mood: good  Affect:  normal, euthymic, reactive, full-range, mood-congruent, appropriate to situation and context  Thought Process & Associations: linear and goal-directed, with no loosening of associations  Thought Content & Perceptions:  no suicidal or homicidal ideation, no evidence of psychosis  Sensorium and Cognition:  grossly intact, no significant deficits noted  Insight & Judgment:  intact, demonstrates awareness of illness and adequate/appropriate behavior given the circumstances      RISK MANAGEMENT:     I[]I Y  I[x]I N  I[]I U  I[]I A  Suicidal Ideation/Behavior:   I[]I Y  I[x]I N  I[]I U  I[]I A  Homicidal Ideation/Behavior:  I[]I Y  I[x]I N  I[]I U  I[]I A  Violence:  I[]I Y  I[x]I N  I[]I U  I[]I A  Self-Injurious Behavior:     The patient is deemed to be a reliable and factually accurate historian.    I[]I Y  I[x]I N  I[]I U  I[]I A  I[]I N/A  Minimization of Risk Parameters Suspected/Evident:   I[]I Y  I[x]I N  I[]I U  I[]I A  I[]I N/A  Exaggeration of Risk Parameters Suspected/Evident:       [] Y  [x] N  Danger to Self:   []  Y  [x] N  Danger to Others:   [] Y  [x] N  Grave Disability:     The patient was evaluated for psychiatric admission and found not to meet the criteria at this time.  The patient can be safely and effectively managed in a less restrictive level of care.    In cases of emergency, daily coverage provided by Acute/ER Psych MD, NP, PA, or SW, with contact numbers located in [facility] On Call Schedule.    Sanjana Murphy MD, PhD  [facility]  -4  05/10/2024      KEY:     I[]I Y = Yes / Present / Endorses  I[]I N = No / Absent / Denies  I[]I U = Unknown / Unable to Assess / Unwilling to Participate  I[]I A = Ambiguity Exists / Accuracy Uncertain  I[]I D = Denial or Minimization is Suspected/Evident  I[]I N/A = Non-Applicable    CHART REVIEW:     Available documentation has been reviewed, and pertinent elements of the chart - including previous psychiatric evaluations - have been incorporated into this evaluation where appropriate.    ADVICE AND COUNSELING:     [x] In cases of emergencies (e.g. SI/HI resulting in danger to self or others, functioning deteriorates to the level of grave disability), call 911 or 988, or present to the emergency department for immediate assistance.  [x] Individuals should not operate a motor vehicle or heavy machinery if effects of medications or underlying symptoms/condition impair the ability to safely do so.    Alcohol, Tobacco, and Drug Counseling, as well as resources, has been provided, as warranted.     Shared medical decision making and informed consent are the hallmark and bedrock of good clinical care, and as such have been employed and obtained, respectively, to the degree possible.      Risk Mitigation Strategies, Harm Reduction Techniques, and Safety Netting are important interventions that can reduce acute and chronic risk, and as such have been employed to the degree possible.    Prescription Drug Management entails the review, recommendation, or  consideration without recommendation of medications, and as such was employed during the encounter.    Additional Psychoeducation has been provided, as warranted.    Discussed, to the extent possible, diagnosis, risks and benefits of proposed treatment vs alternative treatments vs no treatment, potential side effects of these treatments and the inherent unpredictability of treatment. The patient expresses understanding of the above and displays the capacity to agree with this treatment given said understanding. Patient also agrees that, currently, the benefits outweigh the risks and consents to treatment at this time.     Written material has been provided to supplement, augment, and reinforce any discussions and interventions, via the AVS or other pre-printed handouts, as warranted.      DIAGNOSTIC TESTING:     The chart was reviewed for recent diagnostic procedures and investigations, and pertinent results are noted below.     Glu 94  5/7/2024  Li *   *  TSH 0.698  1/14/2024    HgA1c 5.4  9/7/2023  VPA *   *   FT4 0.82  1/14/2024    Na 140  5/7/2024  CLZ *   *  WBC 7.16  1/14/2024    Cr 0.9  5/7/2024  ANC 4.5; 63.3;   1/14/2024   Hgb 14.8  1/14/2024     BUN 19  5/7/2024  Trop I *   *  HCT 42.6  1/14/2024     GFR >60.0  5/7/2024   CPK *   *    1/14/2024     Alb 4.5  5/7/2024   PRL *   *  B12 *   *     T Bili 1.4 (H)  5/7/2024  Chol 134  1/14/2024  B9 *   *    ALP 57  5/7/2024  TGs 144  1/14/2024  B1 *   *    AST 19  5/7/2024  HDL 76 (H)  1/14/2024  Vit D *   *     ALT 15  5/7/2024  LDL 29.2 (L)  1/14/2024  HIV Non-reactive  1/14/2024     INR 1.0  1/14/2024  Flor *   *   Hep C Non-reactive  1/14/2024    GGT *   *  Lip *   *  RPR *   *    MCV 95  1/14/2024   NH4 *   *  UPT *   *      PETH 77  5/7/2024  THC Negative  5/8/2024    ETOH 256 (H)  1/14/2024  DIONI Negative  5/8/2024    EtG Negative  5/8/2024  AMP Negative  5/8/2024    ALC <10  5/8/2024  OPI Negative   5/8/2024    BZO Negative  5/8/2024  MTD Negative  5/8/2024     BAR Negative  5/8/2024  BUP *   *    PCP Negative  5/8/2024  FEN *   *

## 2024-05-11 LAB
AMPHET+METHAMPHET UR QL: NEGATIVE
BARBITURATES UR QL SCN>200 NG/ML: NEGATIVE
BENZODIAZ UR QL SCN>200 NG/ML: NEGATIVE
BZE UR QL SCN: NEGATIVE
CANNABINOIDS UR QL SCN: NEGATIVE
CREAT UR-MCNC: 86 MG/DL (ref 23–375)
ETHANOL UR-MCNC: <10 MG/DL
METHADONE UR QL SCN>300 NG/ML: NEGATIVE
OPIATES UR QL SCN: NEGATIVE
PCP UR QL SCN>25 NG/ML: NEGATIVE
TOXICOLOGY INFORMATION: NORMAL

## 2024-05-12 NOTE — PLAN OF CARE
05/10/24 1300   Activity/Group Therapy Checklist   Group Goals/Reflection   Attendance Attended   Follows Direction Followed directions   Group Interactions/Observations Interacted appropriately   Affect/Mood Range Normal range   Affect/Mood Display Appropriate   Goal Progression Progressing

## 2024-05-13 ENCOUNTER — HOSPITAL ENCOUNTER (OUTPATIENT)
Dept: PSYCHIATRY | Facility: HOSPITAL | Age: 79
Discharge: HOME | End: 2024-05-13
Attending: PSYCHIATRY & NEUROLOGY
Payer: MEDICARE

## 2024-05-13 DIAGNOSIS — Z79.899 LONG-TERM USE OF HIGH-RISK MEDICATION: Primary | ICD-10-CM

## 2024-05-13 DIAGNOSIS — Z79.899 LONG-TERM USE OF HIGH-RISK MEDICATION: ICD-10-CM

## 2024-05-13 DIAGNOSIS — F10.20 ALCOHOL USE DISORDER, MODERATE, DEPENDENCE: Primary | ICD-10-CM

## 2024-05-13 DIAGNOSIS — F41.1 GAD (GENERALIZED ANXIETY DISORDER): ICD-10-CM

## 2024-05-13 LAB
AMPHET+METHAMPHET UR QL: NEGATIVE
BARBITURATES UR QL SCN>200 NG/ML: NEGATIVE
BENZODIAZ UR QL SCN>200 NG/ML: NEGATIVE
BZE UR QL SCN: NEGATIVE
CANNABINOIDS UR QL SCN: NEGATIVE
CREAT UR-MCNC: 65 MG/DL (ref 23–375)
ETHANOL UR-MCNC: <10 MG/DL
METHADONE UR QL SCN>300 NG/ML: NEGATIVE
OPIATES UR QL SCN: NEGATIVE
PCP UR QL SCN>25 NG/ML: NEGATIVE
TOXICOLOGY INFORMATION: NORMAL

## 2024-05-13 PROCEDURE — 90853 GROUP PSYCHOTHERAPY: CPT | Mod: ,,,

## 2024-05-13 PROCEDURE — 80307 DRUG TEST PRSMV CHEM ANLYZR: CPT | Performed by: PSYCHIATRY & NEUROLOGY

## 2024-05-13 PROCEDURE — 90853 GROUP PSYCHOTHERAPY: CPT | Performed by: SOCIAL WORKER

## 2024-05-13 NOTE — PROGRESS NOTES
Group Psychotherapy (PhD/LCSW)     Site: [facility]     Clinical status of patient: Intensive Outpatient Program (IOP)     Date:05/13/2024      Group Focus: Distress Tolerance     Length of service: 60138 - 45-50 minutes     Number of patients in attendance: 11     Referred by: [facility]     Target symptoms: Substance Abuse and Anxiety     Patient's response to treatment: Active Listening      Progress toward goals: Progressing adequately     Interval History: Participants reviewed psychoeducation on the fight or flight system, as well as ways in which the fight or flight system is triggered by and influences our response to emotionally heightened situations. Participants discussed strategies for assessing when to utilize distress tolerance/crisis survival skills. Participants discussed pillars of STOP Skill and explored how to implement and practice this skill.      Diagnosis:     ICD-10-CM ICD-9-CM   1. Alcohol use disorder, moderate, dependence  F10.20 303.90   2. LINDSAY (generalized anxiety disorder)  F41.1 300.02   3. Long-term use of high-risk medication  Z79.899 V58.69      Plan: Continue treatment on ORP

## 2024-05-13 NOTE — PLAN OF CARE
05/13/24 1525   Activity/Group Therapy Checklist   Group Addiction Education   Attendance Did not attend     Pt did not attend group.

## 2024-05-13 NOTE — PLAN OF CARE
05/13/24 1300   Activity/Group Therapy Checklist   Group Goals/Reflection   Attendance Attended   Follows Direction Followed directions   Group Interactions/Observations Interacted appropriately   Affect/Mood Range Normal range   Affect/Mood Display Appropriate   Goal Progression Progressing

## 2024-05-13 NOTE — PROGRESS NOTES
"FOLLOW UP VISIT: [facility]  DATE OF ADMISSION: 5/7/24    ASSESSMENT AND PLAN:     DIAGNOSES & PROBLEMS:  Alcohol use disorder, severe  LINDSAY    In Summary:  Pt is a 79yo male presenting to ORP today to continue treatment for alcohol use disorder.  Pt had presentation to ED in January 2024 which was found to be alcohol intoxication.  Since that event, pt went to inpatient detox/rehab at the insistence of his wife and daughter.  Pt reports relapse since discharge of a few drinks, but denies persistent or daily alcohol use.  Last drink was reportedly about a week ago.  He feels that his current medications have been very helpful and denies medication side effects.  Pt is motivated to be in the program at this time due to the insistence of his wife and daughter.       Plan:  -Ctn gabapentin 300mg bid  -Ctn Zoloft 100mg daily  -Ctn naltrexone 50mg daily        - continue ORP and program protocol; while in program will continue to monitor routine VS, breathalyzer and alcohol/drug screenings  - continue MAT as prescribed  - monitor random drug/alcohol screening  - routine monitoring of PETH levels to assess for maintenance of sobriety  - counseled on full abstinence from alcohol and substances of abuse (illicit and prescription)  - full engagement in 12 step (or equivalent) recovery program(s), including meeting attendance and acquisition/maintenance of sponsor     INTERVAL HISTORY AND ROS:     Had a busy weekend, out of town on a business trip in [facility].  Reports it was his first weekend on his own in awhile.  Denies alcohol use or cravings.  He will be going out of town again this weekend to [place] with his son.  Flights are not a trigger for him.    He attended "1.5" AA meetings over the weekend. He clarifies he attended virtual AA half way through [place].  He was mainly a listener, not an active participant.  His commute is a barrier to in person attendance.  He finds redundancy in listening " to other people's stories.  He does find the 1:1 time helpful.    He has been exercising daily.   Resistant towards letting us speak with his wife.  Amenable to obtaining medical records from rehab at [facility] in [facility].       WITHDRAWAL SYMPTOMS: no  CRAVINGS: no    CURRENT PSYCHOTROPIC REGIMEN:  Zoloft 100mg daily  Gabapentin 300mg BID  Naltrexone 50mg daily      PERTINENT PAST HISTORY AND CHART REVIEW:   Per Chart:  Pt with presentation January 2024 to ED for slurred speech.  Stroke workup was negative and patient was found to have an EtOH level of 256.  Pt had driven from [email or web address] himself for the evaluation.  Per chart review, his wife said that they were on a cruise recently a couple of weeks ago and said that he had been hiding bottles of alcohol from her. She thought that he had stopped drinking.        Per Patient:  Pt says since the pandemic he has been drinking much more heavily.  Alcohol use has increased since then.  IN September he fell and broke his elbow when on the golf [place].  Pt was untruthful with his wife who was concerned about his alcohol use around November or December.  In January he had a drink that morning and was very concerned he was having a seizure or something bad.  He was evaluated in the ED and found to have alcohol intoxication.  Daughter and wife insisted he needed inpatient detox.  He went to a facility in [place], [place] for 4 weeks mid January to mid February.  When coming home from the program, he started drinking some again.  Last drink was 1 week ago.  He did a couple of shots at that time, but didn't keep drinking because he didn't find it pleasurable.  Pt is on naltrexone and finds it to be helpful.  Gabapentin was also started while in rehab.  Since cutting back and recently stopping drinking, he feels physically much better.  He also sleeps well since he stopped.  When drinking at his heaviest, states he was drinking 3-4 glasses of wine once daily every day.   Denies having withdrawal symptoms when stopping.  Denies cravings.  He is not currently in a 12 step program.  Pt is in the ORP at the insistence of his wife and daughter, who feels he should have more support.        Pt taking Zoloft 100mg daily.  This was originally started by his PCP after fracture of his elbow.  Pt says that his daughter specifically asked for him to be on Zoloft and he isn't sure why it was started.  He feels that helps him sleep because his mind isn't thinking as much when he tries to sleep.  Denies ever feeling sad/depressed or anxiety. Denies any side effects to current medications.       EXAMINATION:     There were no vitals taken for this visit.    MENTAL STATUS EXAMINATION:  General Appearance & Behavior:  adequately groomed, appropriately dressed, in no apparent distress, under good behavioral control  Involuntary Movements and Motor Activity:  no abnormal involuntary movements noted, no psychomotor agitation or retardation  Speech & Language: conversational, spontaneous, speaks and understands English proficiently  Mood: good  Affect:  normal, euthymic, reactive, full-range, mood-congruent, appropriate to situation and context  Thought Process & Associations: linear and goal-directed, with no loosening of associations  Thought Content & Perceptions:  no suicidal or homicidal ideation, no evidence of psychosis  Sensorium and Cognition:  grossly intact, no significant deficits noted  Insight & Judgment:  intact, demonstrates awareness of illness and adequate/appropriate behavior given the circumstances      RISK MANAGEMENT:     I[]I Y  I[x]I N  I[]I U  I[]I A  Suicidal Ideation/Behavior:   I[]I Y  I[x]I N  I[]I U  I[]I A  Homicidal Ideation/Behavior:  I[]I Y  I[x]I N  I[]I U  I[]I A  Violence:  I[]I Y  I[x]I N  I[]I U  I[]I A  Self-Injurious Behavior:     The patient is deemed to be a reliable and factually accurate historian.    I[]I Y  I[x]I N  I[]I U  I[]I A  I[]I N/A  Minimization of  Risk Parameters Suspected/Evident:   I[]I Y  I[x]I N  I[]I U  I[]I A  I[]I N/A  Exaggeration of Risk Parameters Suspected/Evident:       [] Y  [x] N  Danger to Self:   [] Y  [x] N  Danger to Others:   [] Y  [x] N  Grave Disability:     The patient was evaluated for psychiatric admission and found not to meet the criteria at this time.  The patient can be safely and effectively managed in a less restrictive level of care.    In cases of emergency, daily coverage provided by Acute/ER Psych MD, NP, PA, or SW, with contact numbers located in [facility] On Call Schedule.    Ronaldo Schultz MD  Addiction Psychiatry PGY-5        KEY:     I[]I Y = Yes / Present / Endorses  I[]I N = No / Absent / Denies  I[]I U = Unknown / Unable to Assess / Unwilling to Participate  I[]I A = Ambiguity Exists / Accuracy Uncertain  I[]I D = Denial or Minimization is Suspected/Evident  I[]I N/A = Non-Applicable    CHART REVIEW:     Available documentation has been reviewed, and pertinent elements of the chart - including previous psychiatric evaluations - have been incorporated into this evaluation where appropriate.    ADVICE AND COUNSELING:     [x] In cases of emergencies (e.g. SI/HI resulting in danger to self or others, functioning deteriorates to the level of grave disability), call 911 or 988, or present to the emergency department for immediate assistance.  [x] Individuals should not operate a motor vehicle or heavy machinery if effects of medications or underlying symptoms/condition impair the ability to safely do so.    Alcohol, Tobacco, and Drug Counseling, as well as resources, has been provided, as warranted.     Shared medical decision making and informed consent are the hallmark and bedrock of good clinical care, and as such have been employed and obtained, respectively, to the degree possible.      Risk Mitigation Strategies, Harm Reduction Techniques, and Safety Netting are important interventions that  can reduce acute and chronic risk, and as such have been employed to the degree possible.    Prescription Drug Management entails the review, recommendation, or consideration without recommendation of medications, and as such was employed during the encounter.    Additional Psychoeducation has been provided, as warranted.    Discussed, to the extent possible, diagnosis, risks and benefits of proposed treatment vs alternative treatments vs no treatment, potential side effects of these treatments and the inherent unpredictability of treatment. The patient expresses understanding of the above and displays the capacity to agree with this treatment given said understanding. Patient also agrees that, currently, the benefits outweigh the risks and consents to treatment at this time.     Written material has been provided to supplement, augment, and reinforce any discussions and interventions, via the AVS or other pre-printed handouts, as warranted.      DIAGNOSTIC TESTING:     The chart was reviewed for recent diagnostic procedures and investigations, and pertinent results are noted below.     Glu 94  5/7/2024  Li *   *  TSH 0.698  1/14/2024    HgA1c 5.4  9/7/2023  VPA *   *   FT4 0.82  1/14/2024    Na 140  5/7/2024  CLZ *   *  WBC 7.16  1/14/2024    Cr 0.9  5/7/2024  ANC 4.5; 63.3;   1/14/2024   Hgb 14.8  1/14/2024     BUN 19  5/7/2024  Trop I *   *  HCT 42.6  1/14/2024     GFR >60.0  5/7/2024   CPK *   *    1/14/2024     Alb 4.5  5/7/2024   PRL *   *  B12 *   *     T Bili 1.4 (H)  5/7/2024  Chol 134  1/14/2024  B9 *   *    ALP 57  5/7/2024  TGs 144  1/14/2024  B1 *   *    AST 19  5/7/2024  HDL 76 (H)  1/14/2024  Vit D *   *     ALT 15  5/7/2024  LDL 29.2 (L)  1/14/2024  HIV Non-reactive  1/14/2024     INR 1.0  1/14/2024  Flor *   *   Hep C Non-reactive  1/14/2024    GGT *   *  Lip *   *  RPR *   *    MCV 95  1/14/2024   NH4 *   *  UPT *   *      PETH 77  5/7/2024  THC  Negative  5/10/2024    ETOH 256 (H)  1/14/2024  DIONI Negative  5/10/2024    EtG Negative  5/8/2024  AMP Negative  5/10/2024    ALC <10  5/10/2024  OPI Negative  5/10/2024    BZO Negative  5/10/2024  MTD Negative  5/10/2024     BAR Negative  5/10/2024  BUP *   *    PCP Negative  5/10/2024  FEN *   *

## 2024-05-13 NOTE — PROGRESS NOTES
Group Psychotherapy (PhD/LCSW)     Site: [facility]     Clinical status of patient: Intensive Outpatient Program (IOP)     Date: 05/13/2024      Group Focus: Sleep 101     Length of service: 20172 - 45-60 minutes     Number of patients in attendance: 11     Referred by: [facility]      Target symptoms: Substance Abuse     Patient's response to treatment: Active Listening, and Self-Disclosure      Progress toward goals: Progressing adequately     Interval History:   Session focus was on stimulus control and sleep compression. Clinician and patient discussed associating beds with wakefulness and how to disrupt the connection. Clinician also discussed the use of sleep compression to improve sleep quality and stimulate sleep drive. Patients reviewed factors contributing to sleep hygiene.  Patients reviewed sleep diaries and strategies for implementing stimulus control and sleep compression.     Diagnosis:     ICD-10-CM ICD-9-CM   1. Alcohol use disorder, moderate, dependence  F10.20 303.90   2. LINDSAY (generalized anxiety disorder)  F41.1 300.02            Plan: Continue treatment on ORP

## 2024-05-14 ENCOUNTER — HOSPITAL ENCOUNTER (OUTPATIENT)
Dept: PSYCHIATRY | Facility: HOSPITAL | Age: 79
Discharge: HOME | End: 2024-05-14
Attending: PSYCHIATRY & NEUROLOGY
Payer: MEDICARE

## 2024-05-14 DIAGNOSIS — F41.1 GAD (GENERALIZED ANXIETY DISORDER): ICD-10-CM

## 2024-05-14 DIAGNOSIS — Z79.899 LONG-TERM USE OF HIGH-RISK MEDICATION: ICD-10-CM

## 2024-05-14 DIAGNOSIS — F10.20 ALCOHOL USE DISORDER, MODERATE, DEPENDENCE: Primary | ICD-10-CM

## 2024-05-14 LAB — ETHYL GLUCURONIDE: NEGATIVE NG/ML

## 2024-05-14 PROCEDURE — 90853 GROUP PSYCHOTHERAPY: CPT | Performed by: SOCIAL WORKER

## 2024-05-14 PROCEDURE — 90853 GROUP PSYCHOTHERAPY: CPT | Mod: ,,, | Performed by: PSYCHOLOGIST

## 2024-05-14 NOTE — PLAN OF CARE
05/14/24 1459   Activity/Group Therapy Checklist   Group Other (Comments)   Attendance Did not attend     Pt did not attend group.

## 2024-05-14 NOTE — PROGRESS NOTES
Group Psychotherapy (PhD/LCSW)     Site: [facility]     Clinical status of patient: Intensive Outpatient Program (IOP)     Date: 05/14/2024      Group Focus: Interpersonal Effectiveness     Length of service: 10283 - 45-60 minutes     Number of patients in attendance: 11     Referred by: [facility]      Target symptoms: Substance Abuse     Patient's response to treatment: Active Listening, and Self-Disclosure      Progress toward goals: Progressing adequately     Interval History:   Session focus was Interpersonal Effectiveness:  Validation.  Patients were encouraged to focus on validation of others by enhancing their ability to hear, understand, and respect others. Patients practiced validation of others through role-play and examples.     Diagnosis:     ICD-10-CM ICD-9-CM   1. Alcohol use disorder, moderate, dependence  F10.20 303.90   2. LINDSAY (generalized anxiety disorder)  F41.1 300.02   3. Long-term use of high-risk medication  Z79.899 V58.69            Plan: Continue treatment on ORP

## 2024-05-15 ENCOUNTER — HOSPITAL ENCOUNTER (OUTPATIENT)
Dept: PSYCHIATRY | Facility: HOSPITAL | Age: 79
Discharge: HOME | End: 2024-05-15
Attending: PSYCHIATRY & NEUROLOGY
Payer: MEDICARE

## 2024-05-15 DIAGNOSIS — Z79.899 LONG-TERM USE OF HIGH-RISK MEDICATION: ICD-10-CM

## 2024-05-15 DIAGNOSIS — F41.1 GAD (GENERALIZED ANXIETY DISORDER): ICD-10-CM

## 2024-05-15 DIAGNOSIS — Z79.899 LONG-TERM USE OF HIGH-RISK MEDICATION: Primary | ICD-10-CM

## 2024-05-15 DIAGNOSIS — F10.20 ALCOHOL USE DISORDER, MODERATE, DEPENDENCE: Primary | ICD-10-CM

## 2024-05-15 LAB
AMPHET+METHAMPHET UR QL: NEGATIVE
BARBITURATES UR QL SCN>200 NG/ML: NEGATIVE
BENZODIAZ UR QL SCN>200 NG/ML: NEGATIVE
BZE UR QL SCN: NEGATIVE
CANNABINOIDS UR QL SCN: NEGATIVE
CREAT UR-MCNC: 86 MG/DL (ref 23–375)
ETHANOL UR-MCNC: <10 MG/DL
ETHYL GLUCURONIDE: NEGATIVE NG/ML
METHADONE UR QL SCN>300 NG/ML: NEGATIVE
OPIATES UR QL SCN: NEGATIVE
PCP UR QL SCN>25 NG/ML: NEGATIVE
TOXICOLOGY INFORMATION: NORMAL

## 2024-05-15 PROCEDURE — 80307 DRUG TEST PRSMV CHEM ANLYZR: CPT | Mod: 59,GZ | Performed by: PSYCHIATRY & NEUROLOGY

## 2024-05-15 PROCEDURE — 90853 GROUP PSYCHOTHERAPY: CPT | Mod: ,,, | Performed by: PSYCHOLOGIST

## 2024-05-15 PROCEDURE — 90853 GROUP PSYCHOTHERAPY: CPT | Performed by: SOCIAL WORKER

## 2024-05-15 PROCEDURE — 80307 DRUG TEST PRSMV CHEM ANLYZR: CPT | Performed by: PSYCHIATRY & NEUROLOGY

## 2024-05-15 PROCEDURE — 99232 SBSQ HOSP IP/OBS MODERATE 35: CPT | Mod: ,,, | Performed by: PSYCHIATRY & NEUROLOGY

## 2024-05-15 NOTE — PROGRESS NOTES
Group Psychotherapy (PhD/LCSW)     Site: [facility]     Clinical status of patient: Intensive Outpatient Program (IOP)     Date: 05/15/2024      Group Focus: Mindfulness     Length of service: 24865 - 45-60 minutes     Number of patients in attendance: 12     Referred by: [facility]      Target symptoms: Substance Abuse     Patient's response to treatment: Active Listening, and Self-Disclosure      Progress toward goals: Progressing adequately     Interval History: Session focus was Mindfulness: Mindfulness 'How' Skills. Patient's were introduced to mindfulness 'how' skills of non-judgmentally, one-mindfulness, and effectiveness. Patient's identified the value of each skill, how to use each skill, and practiced the use of each skill in session.    Diagnosis:     ICD-10-CM ICD-9-CM   1. Alcohol use disorder, moderate, dependence  F10.20 303.90   2. LINDSAY (generalized anxiety disorder)  F41.1 300.02         Plan: Continue treatment on ORP

## 2024-05-15 NOTE — PLAN OF CARE
05/14/24 1100   Activity/Group Therapy Checklist   Group Addiction Education   Attendance Attended   Follows Direction Followed directions   Group Interactions/Observations Interacted appropriately   Affect/Mood Range Normal range   Affect/Mood Display Appropriate   Goal Progression Progressing

## 2024-05-15 NOTE — PLAN OF CARE
05/15/24 1300   Activity/Group Therapy Checklist   Group Goals/Reflection   Attendance Attended   Follows Direction Followed directions   Group Interactions/Observations Interacted appropriately   Affect/Mood Range Normal range   Affect/Mood Display Appropriate   Goal Progression Progressing

## 2024-05-15 NOTE — PROGRESS NOTES
FOLLOW UP VISIT: [facility]  DATE OF ADMISSION: 5/7/24    ASSESSMENT AND PLAN:     DIAGNOSES & PROBLEMS:  Alcohol use disorder, severe  LINDSAY    In Summary:  Pt is a 79yo male presenting to ORP today to continue treatment for alcohol use disorder.  Pt had presentation to ED in January 2024 which was found to be alcohol intoxication.  Since that event, pt went to inpatient detox/rehab at the insistence of his wife and daughter.  Pt reports relapse since discharge of a few drinks, but denies persistent or daily alcohol use.  Last drink was reportedly about a week prior to admission.  He feels that his current medications have been very helpful and denies medication side effects.  Pt is motivated to be in the program at this time due to the insistence of his wife and daughter.       Plan:  -Ctn gabapentin 300mg bid  -Ctn Zoloft 100mg daily  -Ctn naltrexone 50mg daily        - continue ORP and program protocol; while in program will continue to monitor routine VS, breathalyzer and alcohol/drug screenings  - continue MAT as prescribed  - monitor random drug/alcohol screening  - routine monitoring of PETH levels to assess for maintenance of sobriety  - counseled on full abstinence from alcohol and substances of abuse (illicit and prescription)  - full engagement in 12 step (or equivalent) recovery program(s), including meeting attendance and acquisition/maintenance of sponsor     INTERVAL HISTORY AND ROS:     Pt missed past two afternoons.  He says he missed due to a doctor's appointment and something else he had to do.  Discussed importance of attendance for full day and discussing in advance any need for leaving early.  Overall he feels the program has been going well.  This weekend he is planning to go to [place] for work and visiting with family.  Tomorrow him and his wife are having dinner with several other couples.  He doesn't feel there will be any temptation to drink and plans to order carbonated water  with lime.  Yesterday he tuned in to an AA meeting that he listed to while in his car.  He feels that he doesn't want to speak in the meetings.  He remains reluctant to get a sponsor and says he will think about it some more. He is not sure who he would like to attend a family meeting yet, but feels his daughter may be best.           WITHDRAWAL SYMPTOMS: no  CRAVINGS: no    CURRENT PSYCHOTROPIC REGIMEN:  Zoloft 100mg daily  Gabapentin 300mg BID  Naltrexone 50mg daily      PERTINENT PAST HISTORY AND CHART REVIEW:   Per Chart:  Pt with presentation January 2024 to ED for slurred speech.  Stroke workup was negative and patient was found to have an EtOH level of 256.  Pt had driven from [place] himself for the evaluation.  Per chart review, his wife said that they were on a cruise recently a couple of weeks ago and said that he had been hiding bottles of alcohol from her. She thought that he had stopped drinking.        Per Patient:  Pt says since the pandemic he has been drinking much more heavily.  Alcohol use has increased since then.  IN September he fell and broke his elbow when on the golf [place].  Pt was untruthful with his wife who was concerned about his alcohol use around November or December.  In January he had a drink that morning and was very concerned he was having a seizure or something bad.  He was evaluated in the ED and found to have alcohol intoxication.  Daughter and wife insisted he needed inpatient detox.  He went to a facility in [place], [place] for 4 weeks mid January to mid February.  When coming home from the program, he started drinking some again.  Last drink was 1 week ago.  He did a couple of shots at that time, but didn't keep drinking because he didn't find it pleasurable.  Pt is on naltrexone and finds it to be helpful.  Gabapentin was also started while in rehab.  Since cutting back and recently stopping drinking, he feels physically much better.  He also sleeps well since he  stopped.  When drinking at his heaviest, states he was drinking 3-4 glasses of wine once daily every day.  Denies having withdrawal symptoms when stopping.  Denies cravings.  He is not currently in a 12 step program.  Pt is in the ORP at the insistence of his wife and daughter, who feels he should have more support.        Pt taking Zoloft 100mg daily.  This was originally started by his PCP after fracture of his elbow.  Pt says that his daughter specifically asked for him to be on Zoloft and he isn't sure why it was started.  He feels that helps him sleep because his mind isn't thinking as much when he tries to sleep.  Denies ever feeling sad/depressed or anxiety. Denies any side effects to current medications.       EXAMINATION:     There were no vitals taken for this visit.    MENTAL STATUS EXAMINATION:  General Appearance & Behavior:  adequately groomed, appropriately dressed, in no apparent distress, under good behavioral control  Involuntary Movements and Motor Activity:  no abnormal involuntary movements noted, no psychomotor agitation or retardation  Speech & Language: conversational, spontaneous, speaks and understands English proficiently  Mood: euthymic   Affect:  normal, euthymic, reactive, full-range, mood-congruent, appropriate to situation and context  Thought Process & Associations: linear and goal-directed, with no loosening of associations  Thought Content & Perceptions:  no suicidal or homicidal ideation, no evidence of psychosis  Sensorium and Cognition:  grossly intact, no significant deficits noted  Insight & Judgment:  intact, demonstrates awareness of illness and adequate/appropriate behavior given the circumstances      RISK MANAGEMENT:     I[]I Y  I[x]I N  I[]I U  I[]I A  Suicidal Ideation/Behavior:   I[]I Y  I[x]I N  I[]I U  I[]I A  Homicidal Ideation/Behavior:  I[]I Y  I[x]I N  I[]I U  I[]I A  Violence:  I[]I Y  I[x]I N  I[]I U  I[]I A  Self-Injurious Behavior:     The patient is deemed  to be a reliable and factually accurate historian.    I[]I Y  I[x]I N  I[]I U  I[]I A  I[]I N/A  Minimization of Risk Parameters Suspected/Evident:   I[]I Y  I[x]I N  I[]I U  I[]I A  I[]I N/A  Exaggeration of Risk Parameters Suspected/Evident:       [] Y  [x] N  Danger to Self:   [] Y  [x] N  Danger to Others:   [] Y  [x] N  Grave Disability:     The patient was evaluated for psychiatric admission and found not to meet the criteria at this time.  The patient can be safely and effectively managed in a less restrictive level of care.    In cases of emergency, daily coverage provided by Acute/ER Psych MD, NP, PA, or SW, with contact numbers located in [facility] On Call Schedule.    Sanjana Murphy MD, PhD  [facility]  -4  05/15/2024          KEY:     I[]I Y = Yes / Present / Endorses  I[]I N = No / Absent / Denies  I[]I U = Unknown / Unable to Assess / Unwilling to Participate  I[]I A = Ambiguity Exists / Accuracy Uncertain  I[]I D = Denial or Minimization is Suspected/Evident  I[]I N/A = Non-Applicable    CHART REVIEW:     Available documentation has been reviewed, and pertinent elements of the chart - including previous psychiatric evaluations - have been incorporated into this evaluation where appropriate.    ADVICE AND COUNSELING:     [x] In cases of emergencies (e.g. SI/HI resulting in danger to self or others, functioning deteriorates to the level of grave disability), call 911 or [phone], or present to the emergency department for immediate assistance.  [x] Individuals should not operate a motor vehicle or heavy machinery if effects of medications or underlying symptoms/condition impair the ability to safely do so.    Alcohol, Tobacco, and Drug Counseling, as well as resources, has been provided, as warranted.     Shared medical decision making and informed consent are the hallmark and bedrock of good clinical care, and as such have been employed and obtained, respectively, to  the degree possible.      Risk Mitigation Strategies, Harm Reduction Techniques, and Safety Netting are important interventions that can reduce acute and chronic risk, and as such have been employed to the degree possible.    Prescription Drug Management entails the review, recommendation, or consideration without recommendation of medications, and as such was employed during the encounter.    Additional Psychoeducation has been provided, as warranted.    Discussed, to the extent possible, diagnosis, risks and benefits of proposed treatment vs alternative treatments vs no treatment, potential side effects of these treatments and the inherent unpredictability of treatment. The patient expresses understanding of the above and displays the capacity to agree with this treatment given said understanding. Patient also agrees that, currently, the benefits outweigh the risks and consents to treatment at this time.     Written material has been provided to supplement, augment, and reinforce any discussions and interventions, via the AVS or other pre-printed handouts, as warranted.      DIAGNOSTIC TESTING:     The chart was reviewed for recent diagnostic procedures and investigations, and pertinent results are noted below.     Glu 94  5/7/2024  Li *   *  TSH 0.698  1/14/2024    HgA1c 5.4  9/7/2023  VPA *   *   FT4 0.82  1/14/2024    Na 140  5/7/2024  CLZ *   *  WBC 7.16  1/14/2024    Cr 0.9  5/7/2024  ANC 4.5; 63.3;   1/14/2024   Hgb 14.8  1/14/2024     BUN 19  5/7/2024  Trop I *   *  HCT 42.6  1/14/2024     GFR >60.0  5/7/2024   CPK *   *    1/14/2024     Alb 4.5  5/7/2024   PRL *   *  B12 *   *     T Bili 1.4 (H)  5/7/2024  Chol 134  1/14/2024  B9 *   *    ALP 57  5/7/2024  TGs 144  1/14/2024  B1 *   *    AST 19  5/7/2024  HDL 76 (H)  1/14/2024  Vit D *   *     ALT 15  5/7/2024  LDL 29.2 (L)  1/14/2024  HIV Non-reactive  1/14/2024     INR 1.0  1/14/2024  Flor *   *   Hep C  Non-reactive  1/14/2024    GGT *   *  Lip *   *  RPR *   *    MCV 95  1/14/2024   NH4 *   *  UPT *   *      PETH 77  5/7/2024  THC Negative  5/13/2024    ETOH 256 (H)  1/14/2024  DIONI Negative  5/13/2024    EtG Negative  5/13/2024  AMP Negative  5/13/2024    ALC <10  5/13/2024  OPI Negative  5/13/2024    BZO Negative  5/13/2024  MTD Negative  5/13/2024     BAR Negative  5/13/2024  BUP *   *    PCP Negative  5/13/2024  FEN *   *

## 2024-05-16 ENCOUNTER — HOSPITAL ENCOUNTER (OUTPATIENT)
Dept: PSYCHIATRY | Facility: HOSPITAL | Age: 79
Discharge: HOME | End: 2024-05-16
Attending: PSYCHIATRY & NEUROLOGY
Payer: MEDICARE

## 2024-05-16 DIAGNOSIS — F10.20 ALCOHOL USE DISORDER, MODERATE, DEPENDENCE: Primary | ICD-10-CM

## 2024-05-16 DIAGNOSIS — F41.1 GAD (GENERALIZED ANXIETY DISORDER): ICD-10-CM

## 2024-05-16 PROCEDURE — 90853 GROUP PSYCHOTHERAPY: CPT | Mod: ,,, | Performed by: PSYCHOLOGIST

## 2024-05-16 PROCEDURE — 90853 GROUP PSYCHOTHERAPY: CPT

## 2024-05-16 PROCEDURE — 90853 GROUP PSYCHOTHERAPY: CPT | Performed by: SOCIAL WORKER

## 2024-05-16 NOTE — PLAN OF CARE
"   05/16/24 0925   Activity/Group Therapy Checklist   Group Other (Comments)  (Creative Therapy)   Attendance Attended   Follows Direction Followed directions   Group Interactions/Observations Interacted appropriately;Alert;Sharing;Supportive   Affect/Mood Range Normal range   Affect/Mood Display Appropriate   Goal Progression Progressing      facilitated self care creative session and dicussed with patients how creative therapy can be therapeutic to resolving anxiety and other stress-related issues. SW discussed activity: " Dance Therapy". SW discussed "Dance Therapy Movement" and the benefits of it. SW discussed two specific dance movements : Mirroring and Free flow dance. Pts were able to practice both techniques.   "

## 2024-05-16 NOTE — PROGRESS NOTES
Group Psychotherapy (PhD/LCSW)     Site: [facility]     Clinical status of patient: Intensive Outpatient Program (IOP)     Date: 05/16/2024      Group Focus: Emotion Regulation      Length of service: 58696 - 45-60 minutes     Number of patients in attendance: 11     Referred by: [facility]      Target symptoms: Substance Abuse     Patient's response to treatment: Active Listening      Progress toward goals: Progressing adequately     Interval History:Session focus was Emotion Regulation:  Understanding Emotions.  Patients were encouraged to understand what their emotions do for them (motivate them to action, communicate to themselves and others).  Check the Facts was introduced.    Diagnosis:     ICD-10-CM ICD-9-CM   1. Alcohol use disorder, moderate, dependence  F10.20 303.90   2. LINDSAY (generalized anxiety disorder)  F41.1 300.02         Plan: Continue treatment on ORP

## 2024-05-17 ENCOUNTER — HOSPITAL ENCOUNTER (OUTPATIENT)
Dept: PSYCHIATRY | Facility: HOSPITAL | Age: 79
Discharge: HOME | End: 2024-05-17
Attending: PSYCHIATRY & NEUROLOGY
Payer: MEDICARE

## 2024-05-17 DIAGNOSIS — F10.20 ALCOHOL USE DISORDER, MODERATE, DEPENDENCE: Primary | ICD-10-CM

## 2024-05-17 DIAGNOSIS — F41.1 GAD (GENERALIZED ANXIETY DISORDER): ICD-10-CM

## 2024-05-17 DIAGNOSIS — Z79.899 LONG-TERM USE OF HIGH-RISK MEDICATION: ICD-10-CM

## 2024-05-17 LAB
AMPHET+METHAMPHET UR QL: NEGATIVE
BARBITURATES UR QL SCN>200 NG/ML: NEGATIVE
BENZODIAZ UR QL SCN>200 NG/ML: NEGATIVE
BZE UR QL SCN: NEGATIVE
CANNABINOIDS UR QL SCN: NEGATIVE
CREAT UR-MCNC: 89 MG/DL (ref 23–375)
ETHANOL UR-MCNC: <10 MG/DL
ETHYL GLUCURONIDE: NEGATIVE NG/ML
METHADONE UR QL SCN>300 NG/ML: NEGATIVE
OPIATES UR QL SCN: NEGATIVE
PCP UR QL SCN>25 NG/ML: NEGATIVE
TOXICOLOGY INFORMATION: NORMAL

## 2024-05-17 PROCEDURE — 99232 SBSQ HOSP IP/OBS MODERATE 35: CPT | Mod: ,,, | Performed by: PSYCHIATRY & NEUROLOGY

## 2024-05-17 PROCEDURE — 80307 DRUG TEST PRSMV CHEM ANLYZR: CPT | Performed by: PSYCHIATRY & NEUROLOGY

## 2024-05-17 PROCEDURE — 90853 GROUP PSYCHOTHERAPY: CPT | Performed by: SOCIAL WORKER

## 2024-05-17 PROCEDURE — 90853 GROUP PSYCHOTHERAPY: CPT

## 2024-05-17 NOTE — PROGRESS NOTES
FOLLOW UP VISIT: [facility]  DATE OF ADMISSION: 5/7/24    ASSESSMENT AND PLAN:     DIAGNOSES & PROBLEMS:  Alcohol use disorder, severe  LINDSAY    In Summary:  Pt is a 77yo male presenting to ORP today to continue treatment for alcohol use disorder.  Pt had presentation to ED in January 2024 which was found to be alcohol intoxication.  Since that event, pt went to inpatient detox/rehab at the insistence of his wife and daughter.  Pt reports relapse since discharge of a few drinks, but denies persistent or daily alcohol use.  Last drink was reportedly about a week prior to admission.  He feels that his current medications have been very helpful and denies medication side effects.  Pt is motivated to be in the program at this time due to the insistence of his wife and daughter.       Plan:  -Ctn gabapentin 300mg bid  -Ctn Zoloft 100mg daily  -Ctn naltrexone 50mg daily        - continue ORP and program protocol; while in program will continue to monitor routine VS, breathalyzer and alcohol/drug screenings  - continue MAT as prescribed  - monitor random drug/alcohol screening  - routine monitoring of PETH levels to assess for maintenance of sobriety  - counseled on full abstinence from alcohol and substances of abuse (illicit and prescription)  - full engagement in 12 step (or equivalent) recovery program(s), including meeting attendance and acquisition/maintenance of sponsor     INTERVAL HISTORY AND ROS:     Overall reports program is going well.  He feels content of group sessions is helpful and he identifies one of the therapists in particular that he feels he connects well with.  No cravings.  He plans to go to AA meetings next week.  Feels medications are helpful.  Pt wants to miss two afternoons due to grandchildren's after school program. Have previously discussed attendance and patient insists he wants to attend these events.             WITHDRAWAL SYMPTOMS: no  CRAVINGS: no    CURRENT PSYCHOTROPIC  REGIMEN:  Zoloft 100mg daily  Gabapentin 300mg BID  Naltrexone 50mg daily      PERTINENT PAST HISTORY AND CHART REVIEW:   Per Chart:  Pt with presentation January 2024 to ED for slurred speech.  Stroke workup was negative and patient was found to have an EtOH level of 256.  Pt had driven from [facility] himself for the evaluation.  Per chart review, his wife said that they were on a cruise recently a couple of weeks ago and said that he had been hiding bottles of alcohol from her. She thought that he had stopped drinking.        Per Patient:  Pt says since the pandemic he has been drinking much more heavily.  Alcohol use has increased since then.  IN September he fell and broke his elbow when [place] the [place].  Pt was untruthful with his wife who was concerned about his alcohol use around November or December.  In January he had a drink that morning and was very concerned he was having a seizure or something bad.  He was evaluated in the ED and found to have alcohol intoxication.  Daughter and wife insisted he needed inpatient detox.  He went to a facility in [place], [place] for 4 weeks mid January to mid February.  When coming home from the program, he started drinking some again.  Last drink was 1 week ago.  He did a couple of shots at that time, but didn't keep drinking because he didn't find it pleasurable.  Pt is on naltrexone and finds it to be helpful.  Gabapentin was also started while in rehab.  Since cutting back and recently stopping drinking, he feels physically much better.  He also sleeps well since he stopped.  When drinking at his heaviest, states he was drinking 3-4 glasses of wine once daily every day.  Denies having withdrawal symptoms when stopping.  Denies cravings.  He is not currently in a 12 step program.  Pt is in the ORP at the insistence of his wife and daughter, who feels he should have more support.        Pt taking Zoloft 100mg daily.  This was originally started by his PCP after  fracture of his elbow.  Pt says that his daughter specifically asked for him to be on Zoloft and he isn't sure why it was started.  He feels that helps him sleep because his mind isn't thinking as much when he tries to sleep.  Denies ever feeling sad/depressed or anxiety. Denies any side effects to current medications.       EXAMINATION:     There were no vitals taken for this visit.    MENTAL STATUS EXAMINATION:  General Appearance & Behavior:  adequately groomed, appropriately dressed, in no apparent distress, under good behavioral control  Involuntary Movements and Motor Activity:  no abnormal involuntary movements noted, no psychomotor agitation or retardation  Speech & Language: conversational, spontaneous, speaks and understands English proficiently  Mood: euthymic   Affect:  normal, euthymic, reactive, full-range, mood-congruent, appropriate to situation and context  Thought Process & Associations: linear and goal-directed, with no loosening of associations  Thought Content & Perceptions:  no suicidal or homicidal ideation, no evidence of psychosis  Sensorium and Cognition:  grossly intact, no significant deficits noted  Insight & Judgment:  intact, demonstrates awareness of illness and adequate/appropriate behavior given the circumstances      RISK MANAGEMENT:     I[]I Y  I[x]I N  I[]I U  I[]I A  Suicidal Ideation/Behavior:   I[]I Y  I[x]I N  I[]I U  I[]I A  Homicidal Ideation/Behavior:  I[]I Y  I[x]I N  I[]I U  I[]I A  Violence:  I[]I Y  I[x]I N  I[]I U  I[]I A  Self-Injurious Behavior:     The patient is deemed to be a reliable and factually accurate historian.    I[]I Y  I[x]I N  I[]I U  I[]I A  I[]I N/A  Minimization of Risk Parameters Suspected/Evident:   I[]I Y  I[x]I N  I[]I U  I[]I A  I[]I N/A  Exaggeration of Risk Parameters Suspected/Evident:       [] Y  [x] N  Danger to Self:   [] Y  [x] N  Danger to Others:   [] Y  [x] N  Grave Disability:     The patient was evaluated for psychiatric  admission and found not to meet the criteria at this time.  The patient can be safely and effectively managed in a less restrictive level of care.    In cases of emergency, daily coverage provided by Acute/ER Psych MD, NP, PA, or SW, with contact numbers located in [facility] On Call Schedule.    Sanjana Murphy MD, PhD  [facility]  HO-4  05/17/2024      KEY:     I[]I Y = Yes / Present / Endorses  I[]I N = No / Absent / Denies  I[]I U = Unknown / Unable to Assess / Unwilling to Participate  I[]I A = Ambiguity Exists / Accuracy Uncertain  I[]I D = Denial or Minimization is Suspected/Evident  I[]I N/A = Non-Applicable    CHART REVIEW:     Available documentation has been reviewed, and pertinent elements of the chart - including previous psychiatric evaluations - have been incorporated into this evaluation where appropriate.    ADVICE AND COUNSELING:     [x] In cases of emergencies (e.g. SI/HI resulting in danger to self or others, functioning deteriorates to the level of grave disability), call 911 or 988, or present to the emergency department for immediate assistance.  [x] Individuals should not operate a motor vehicle or heavy machinery if effects of medications or underlying symptoms/condition impair the ability to safely do so.    Alcohol, Tobacco, and Drug Counseling, as well as resources, has been provided, as warranted.     Shared medical decision making and informed consent are the hallmark and bedrock of good clinical care, and as such have been employed and obtained, respectively, to the degree possible.      Risk Mitigation Strategies, Harm Reduction Techniques, and Safety Netting are important interventions that can reduce acute and chronic risk, and as such have been employed to the degree possible.    Prescription Drug Management entails the review, recommendation, or consideration without recommendation of medications, and as such was employed during the encounter.    Additional  Psychoeducation has been provided, as warranted.    Discussed, to the extent possible, diagnosis, risks and benefits of proposed treatment vs alternative treatments vs no treatment, potential side effects of these treatments and the inherent unpredictability of treatment. The patient expresses understanding of the above and displays the capacity to agree with this treatment given said understanding. Patient also agrees that, currently, the benefits outweigh the risks and consents to treatment at this time.     Written material has been provided to supplement, augment, and reinforce any discussions and interventions, via the AVS or other pre-printed handouts, as warranted.      DIAGNOSTIC TESTING:     The chart was reviewed for recent diagnostic procedures and investigations, and pertinent results are noted below.     Glu 94  5/7/2024  Li *   *  TSH 0.698  1/14/2024    HgA1c 5.4  9/7/2023  VPA *   *   FT4 0.82  1/14/2024    Na 140  5/7/2024  CLZ *   *  WBC 7.16  1/14/2024    Cr 0.9  5/7/2024  ANC 4.5; 63.3;   1/14/2024   Hgb 14.8  1/14/2024     BUN 19  5/7/2024  Trop I *   *  HCT 42.6  1/14/2024     GFR >60.0  5/7/2024   CPK *   *    1/14/2024     Alb 4.5  5/7/2024   PRL *   *  B12 *   *     T Bili 1.4 (H)  5/7/2024  Chol 134  1/14/2024  B9 *   *    ALP 57  5/7/2024  TGs 144  1/14/2024  B1 *   *    AST 19  5/7/2024  HDL 76 (H)  1/14/2024  Vit D *   *     ALT 15  5/7/2024  LDL 29.2 (L)  1/14/2024  HIV Non-reactive  1/14/2024     INR 1.0  1/14/2024  Flor *   *   Hep C Non-reactive  1/14/2024    GGT *   *  Lip *   *  RPR *   *    MCV 95  1/14/2024   NH4 *   *  UPT *   *      PETH 77  5/7/2024  THC Negative  5/15/2024    ETOH 256 (H)  1/14/2024  DIONI Negative  5/15/2024    EtG Negative  5/15/2024  AMP Negative  5/15/2024    ALC <10  5/15/2024  OPI Negative  5/15/2024    BZO Negative  5/15/2024  MTD Negative  5/15/2024     BAR Negative  5/15/2024  BUP *   *    PCP  Negative  5/15/2024  FEN *   *

## 2024-05-17 NOTE — PLAN OF CARE
05/17/24 1114   Activity/Group Therapy Checklist   Group Meditation/Relaxation   Attendance Attended   Group Interactions/Observations Interacted appropriately;Sharing;Supportive;Alert   Affect/Mood Range Normal range   Affect/Mood Display Appropriate   Goal Progression Progressing

## 2024-05-18 NOTE — PROGRESS NOTES
[facility] (PhD/LCSW)     Site: [facility]     Clinical status of patient: Intensive Outpatient Program (IOP)     Date: 05/16/2024      Group Focus: Principles of Recovery      Length of service: 93340 - 45-60 minutes     Number of patients in attendance: 4     Referred by: [facility]      Target symptoms: Substance Abuse     Patient's response to treatment: Active Listening      Progress toward goals: Progressing adequately     Interval History:Session focused on the way recovery is a process (vs a cure) and requires a change in lifestyle to be maintained over time to sustain sobriety      Diagnosis:       ICD-10-CM ICD-9-CM   1. Alcohol use disorder, moderate, dependence  F10.20 303.90   2. LINDSAY (generalized anxiety disorder)  F41.1 300.02         Plan: Continue treatment on ORP

## 2024-05-19 NOTE — PLAN OF CARE
05/16/24 1400   Activity/Group Therapy Checklist   Group Relapse Prevention   Attendance Attended   Follows Direction Followed directions   Group Interactions/Observations Interacted appropriately   Affect/Mood Range Normal range   Affect/Mood Display Appropriate   Goal Progression Progressing

## 2024-05-19 NOTE — PLAN OF CARE
05/17/24 1300   Activity/Group Therapy Checklist   Group Goals/Reflection   Attendance Attended   Follows Direction Followed directions   Group Interactions/Observations Interacted appropriately   Affect/Mood Range Normal range   Affect/Mood Display Appropriate   Goal Progression Progressing

## 2024-05-20 ENCOUNTER — HOSPITAL ENCOUNTER (OUTPATIENT)
Dept: PSYCHIATRY | Facility: HOSPITAL | Age: 79
Discharge: HOME | End: 2024-05-20
Attending: PSYCHIATRY & NEUROLOGY
Payer: MEDICARE

## 2024-05-20 DIAGNOSIS — F41.1 GAD (GENERALIZED ANXIETY DISORDER): ICD-10-CM

## 2024-05-20 DIAGNOSIS — F10.20 ALCOHOL USE DISORDER, MODERATE, DEPENDENCE: Primary | ICD-10-CM

## 2024-05-20 DIAGNOSIS — Z79.899 LONG-TERM USE OF HIGH-RISK MEDICATION: ICD-10-CM

## 2024-05-20 LAB
AMPHET+METHAMPHET UR QL: NEGATIVE
BARBITURATES UR QL SCN>200 NG/ML: NEGATIVE
BENZODIAZ UR QL SCN>200 NG/ML: NEGATIVE
BZE UR QL SCN: NEGATIVE
CANNABINOIDS UR QL SCN: NEGATIVE
CREAT UR-MCNC: 74 MG/DL (ref 23–375)
ETHANOL UR-MCNC: <10 MG/DL
METHADONE UR QL SCN>300 NG/ML: NEGATIVE
OPIATES UR QL SCN: NEGATIVE
PCP UR QL SCN>25 NG/ML: NEGATIVE
TOXICOLOGY INFORMATION: NORMAL

## 2024-05-20 PROCEDURE — 99232 SBSQ HOSP IP/OBS MODERATE 35: CPT | Mod: ,,, | Performed by: PSYCHIATRY & NEUROLOGY

## 2024-05-20 PROCEDURE — 80307 DRUG TEST PRSMV CHEM ANLYZR: CPT | Mod: 59 | Performed by: PSYCHIATRY & NEUROLOGY

## 2024-05-20 PROCEDURE — 90853 GROUP PSYCHOTHERAPY: CPT | Performed by: SOCIAL WORKER

## 2024-05-20 PROCEDURE — 80307 DRUG TEST PRSMV CHEM ANLYZR: CPT | Performed by: PSYCHIATRY & NEUROLOGY

## 2024-05-20 NOTE — PROGRESS NOTES
[facility] (PhD/LCSW)     Site: [facility]     Clinical status of patient: Intensive Outpatient Program (IOP)     Date: 05/20/2024      Group Focus: Distress Tolerance      Length of service: 49052 - 45-60 minutes     Number of patients in attendance: 12     Referred by: [facility]      Target symptoms: Substance Abuse     Patient's response to treatment: Active Listening      Progress toward goals: Progressing adequately     Interval History: Session focus was Distress Tolerance:  Pros & Cons.  Patients were encouraged to use crisis survival skills to reduce intensity of distress.  They were taught to use Pros & Cons to reinforce desired behaviors.    Diagnosis:     ICD-10-CM ICD-9-CM   1. Alcohol use disorder, moderate, dependence  F10.20 303.90   2. LINDSAY (generalized anxiety disorder)  F41.1 300.02         Plan: Continue treatment on ORP

## 2024-05-20 NOTE — PROGRESS NOTES
[facility] (PhD/LCSW)     Site: [facility]     Clinical status of patient: Intensive Outpatient Program (IOP)     Date: 05/20/2024      Group Focus: Sleep 101     Length of service: 65212 - 45-60 minutes     Number of patients in attendance: 12     Referred by: [facility]      Target symptoms: Substance Abuse     Patient's response to treatment: Active Listening      Progress toward goals: Progressing adequately     Interval History: Session focus was on the use and implementation of relaxation techniques to help improve sleep quality and counteract negative sleep thoughts. Patients explored the connection between arousal and sleep. Patients engaged in relaxation techniques to promote relaxation including diaphragmatic breathing, paced breathing, visualization, body scan mediations and progressive muscle relaxation. Patients reviewed their sleep diaries, made adjustments to sleep compression times, and attended to stimulus control.     Diagnosis:     ICD-10-CM ICD-9-CM   1. Alcohol use disorder, moderate, dependence  F10.20 303.90   2. LINDSAY (generalized anxiety disorder)  F41.1 300.02         Plan: Continue treatment on ORP

## 2024-05-20 NOTE — PROGRESS NOTES
FOLLOW UP VISIT: [facility]  DATE OF ADMISSION: 5/7/24    ASSESSMENT AND PLAN:     DIAGNOSES & PROBLEMS:  Alcohol use disorder, severe  Generalized anxiety disorder     In Summary:  Pt is a 77yo male presenting to ORP today to continue treatment for alcohol use disorder.  Pt had presentation to ED in January 2024 which was found to be alcohol intoxication.  Since that event, pt went to inpatient detox/rehab at the insistence of his wife and daughter.  Pt reports relapse since discharge of a few drinks, but denies persistent or daily alcohol use.  Last drink was reportedly about a week prior to admission.  He feels that his current medications have been very helpful and denies medication side effects.  Pt is motivated to be in the program at this time due to the insistence of his wife and daughter.       Plan:  -Ctn gabapentin 300mg bid  -Ctn Zoloft 100mg daily  -Ctn naltrexone 50mg daily        - continue ORP and program protocol; while in program will continue to monitor routine VS, breathalyzer and alcohol/drug screenings  - continue MAT as prescribed  - monitor random drug/alcohol screening  - routine monitoring of PETH levels to assess for maintenance of sobriety  - counseled on full abstinence from alcohol and substances of abuse (illicit and prescription)  - full engagement in 12 step (or equivalent) recovery program(s), including meeting attendance and acquisition/maintenance of sponsor     INTERVAL HISTORY AND ROS:     This weekend he went to [place] for his son's restaurant grand [employer].  Many people were drinking, but he didn't feel the urge to drink. When he was drinking, he would choose to drink alone.  He drank carbonated water with lime.  He changed his exercise routine to the afternoon, which he plans to continue after leaving the program.  When he was drinking, he would drink between the hours of 3 and 6pm.  Exercising instead during this period, he feels will help him to not drink  since he was often drinking when he didn't have activities to do..  He has been sleeping well. Energy is good.  No AA meetings over the the weekend, but will be attending a meeting in [facility].  He does not have a sponsor yet.                WITHDRAWAL SYMPTOMS: no  CRAVINGS: no    CURRENT PSYCHOTROPIC REGIMEN:  Zoloft 100mg daily  Gabapentin 300mg BID  Naltrexone 50mg daily      PERTINENT PAST HISTORY AND CHART REVIEW:   Per Chart:  Pt with presentation January 2024 to ED for slurred speech.  Stroke workup was negative and patient was found to have an EtOH level of 256.  Pt had driven from [place] himself for the evaluation.  Per chart review, his wife said that they were on a cruise recently a couple of weeks ago and said that he had been hiding bottles of alcohol from her. She thought that he had stopped drinking.        Per Patient:  Pt says since the pandemic he has been drinking much more heavily.  Alcohol use has increased since then.  IN September he fell and broke his elbow when on the golf course.  Pt was untruthful with his wife who was concerned about his alcohol use around November or December.  In January he had a drink that morning and was very concerned he was having a seizure or something bad.  He was evaluated in the ED and found to have alcohol intoxication.  Daughter and wife insisted he needed inpatient detox.  He went to a facility in [place], [place] for 4 weeks mid January to mid February.  When coming home from the program, he started drinking some again.  Last drink was 1 week ago.  He did a couple of shots at that time, but didn't keep drinking because he didn't find it pleasurable.  Pt is on naltrexone and finds it to be helpful.  Gabapentin was also started while in rehab.  Since cutting back and recently stopping drinking, he feels physically much better.  He also sleeps well since he stopped.  When drinking at his heaviest, states he was drinking 3-4 glasses of wine once  daily every day.  Denies having withdrawal symptoms when stopping.  Denies cravings.  He is not currently in a 12 step program.  Pt is in the ORP at the insistence of his wife and daughter, who feels he should have more support.        Pt taking Zoloft 100mg daily.  This was originally started by his PCP after fracture of his elbow.  Pt says that his daughter specifically asked for him to be on Zoloft and he isn't sure why it was started.  He feels that helps him sleep because his mind isn't thinking as much when he tries to sleep.  Denies ever feeling sad/depressed or anxiety. Denies any side effects to current medications.       EXAMINATION:     There were no vitals taken for this visit.    MENTAL STATUS EXAMINATION:  General Appearance & Behavior:  adequately groomed, appropriately dressed, in no apparent distress, under good behavioral control  Involuntary Movements and Motor Activity:  no abnormal involuntary movements noted, no psychomotor agitation or retardation  Speech & Language: conversational, spontaneous, speaks and understands English proficiently  Mood: euthymic   Affect:  normal, euthymic, reactive, full-range, mood-congruent, appropriate to situation and context  Thought Process & Associations: linear and goal-directed, with no loosening of associations  Thought Content & Perceptions:  no suicidal or homicidal ideation, no evidence of psychosis  Sensorium and Cognition:  grossly intact, no significant deficits noted  Insight & Judgment:  intact, demonstrates awareness of illness and adequate/appropriate behavior given the circumstances      RISK MANAGEMENT:     I[]I Y  I[x]I N  I[]I U  I[]I A  Suicidal Ideation/Behavior:   I[]I Y  I[x]I N  I[]I U  I[]I A  Homicidal Ideation/Behavior:  I[]I Y  I[x]I N  I[]I U  I[]I A  Violence:  I[]I Y  I[x]I N  I[]I U  I[]I A  Self-Injurious Behavior:     The patient is deemed to be a reliable and factually accurate historian.    I[]I Y  I[x]I N  I[]I U  I[]I A   I[]I N/A  Minimization of Risk Parameters Suspected/Evident:   I[]I Y  I[x]I N  I[]I U  I[]I A  I[]I N/A  Exaggeration of Risk Parameters Suspected/Evident:       [] Y  [x] N  Danger to Self:   [] Y  [x] N  Danger to Others:   [] Y  [x] N  Grave Disability:     The patient was evaluated for psychiatric admission and found not to meet the criteria at this time.  The patient can be safely and effectively managed in a less restrictive level of care.    In cases of emergency, daily coverage provided by Acute/ER Psych MD, NP, PA, or SW, with contact numbers located in [facility] On Call Schedule.    Sanjana Murphy MD, PhD  [facility]  -4  05/20/2024      KEY:     I[]I Y = Yes / Present / Endorses  I[]I N = No / Absent / Denies  I[]I U = Unknown / Unable to Assess / Unwilling to Participate  I[]I A = Ambiguity Exists / Accuracy Uncertain  I[]I D = Denial or Minimization is Suspected/Evident  I[]I N/A = Non-Applicable    CHART REVIEW:     Available documentation has been reviewed, and pertinent elements of the chart - including previous psychiatric evaluations - have been incorporated into this evaluation where appropriate.    ADVICE AND COUNSELING:     [x] In cases of emergencies (e.g. SI/HI resulting in danger to self or others, functioning deteriorates to the level of grave disability), call 911 or 988, or present to the emergency department for immediate assistance.  [x] Individuals should not operate a motor vehicle or heavy machinery if effects of medications or underlying symptoms/condition impair the ability to safely do so.    Alcohol, Tobacco, and Drug Counseling, as well as resources, has been provided, as warranted.     Shared medical decision making and informed consent are the hallmark and bedrock of good clinical care, and as such have been employed and obtained, respectively, to the degree possible.      Risk Mitigation Strategies, Harm Reduction Techniques, and Safety  Netting are important interventions that can reduce acute and chronic risk, and as such have been employed to the degree possible.    Prescription Drug Management entails the review, recommendation, or consideration without recommendation of medications, and as such was employed during the encounter.    Additional Psychoeducation has been provided, as warranted.    Discussed, to the extent possible, diagnosis, risks and benefits of proposed treatment vs alternative treatments vs no treatment, potential side effects of these treatments and the inherent unpredictability of treatment. The patient expresses understanding of the above and displays the capacity to agree with this treatment given said understanding. Patient also agrees that, currently, the benefits outweigh the risks and consents to treatment at this time.     Written material has been provided to supplement, augment, and reinforce any discussions and interventions, via the AVS or other pre-printed handouts, as warranted.      DIAGNOSTIC TESTING:     The chart was reviewed for recent diagnostic procedures and investigations, and pertinent results are noted below.     Glu 94  5/7/2024  Li *   *  TSH 0.698  1/14/2024    HgA1c 5.4  9/7/2023  VPA *   *   FT4 0.82  1/14/2024    Na 140  5/7/2024  CLZ *   *  WBC 7.16  1/14/2024    Cr 0.9  5/7/2024  ANC 4.5; 63.3;   1/14/2024   Hgb 14.8  1/14/2024     BUN 19  5/7/2024  Trop I *   *  HCT 42.6  1/14/2024     GFR >60.0  5/7/2024   CPK *   *    1/14/2024     Alb 4.5  5/7/2024   PRL *   *  B12 *   *     T Bili 1.4 (H)  5/7/2024  Chol 134  1/14/2024  B9 *   *    ALP 57  5/7/2024  TGs 144  1/14/2024  B1 *   *    AST 19  5/7/2024  HDL 76 (H)  1/14/2024  Vit D *   *     ALT 15  5/7/2024  LDL 29.2 (L)  1/14/2024  HIV Non-reactive  1/14/2024     INR 1.0  1/14/2024  Flor *   *   Hep C Non-reactive  1/14/2024    GGT *   *  Lip *   *  RPR *   *    MCV 95  1/14/2024   NH4 *   *  UPT  *   *      PETH 77  5/7/2024  THC Negative  5/17/2024    ETOH 256 (H)  1/14/2024  DIONI Negative  5/17/2024    EtG Negative  5/15/2024  AMP Negative  5/17/2024    ALC <10  5/17/2024  OPI Negative  5/17/2024    BZO Negative  5/17/2024  MTD Negative  5/17/2024     BAR Negative  5/17/2024  BUP *   *    PCP Negative  5/17/2024  FEN *   *

## 2024-05-21 ENCOUNTER — HOSPITAL ENCOUNTER (OUTPATIENT)
Dept: PSYCHIATRY | Facility: HOSPITAL | Age: 79
Discharge: HOME | End: 2024-05-21
Attending: PSYCHIATRY & NEUROLOGY
Payer: MEDICARE

## 2024-05-21 DIAGNOSIS — F10.20 ALCOHOL USE DISORDER, MODERATE, DEPENDENCE: Primary | ICD-10-CM

## 2024-05-21 DIAGNOSIS — F41.1 GAD (GENERALIZED ANXIETY DISORDER): ICD-10-CM

## 2024-05-21 LAB — ETHYL GLUCURONIDE: NEGATIVE NG/ML

## 2024-05-21 PROCEDURE — 90853 GROUP PSYCHOTHERAPY: CPT | Mod: ,,, | Performed by: PSYCHOLOGIST

## 2024-05-21 PROCEDURE — 90853 GROUP PSYCHOTHERAPY: CPT

## 2024-05-21 PROCEDURE — 90853 GROUP PSYCHOTHERAPY: CPT | Performed by: SOCIAL WORKER

## 2024-05-21 NOTE — PSYCH
[facility] ([facility])  Psychosocial Assessment    Date: 5/21/2024  Time: 10:16 AM    Name: Monroe Paul      Chief Complaint: addictive disorder     History of Present Illness: 78 year old male patient presents to the OR-[facility] with chief complaint of alcohol use disorder.  Patient had a presentation to the ED in January of 2024, which was found to be alcohol intoxication.  Subsequently, he entered residential treatment in [place], [place] for 4 weeks mid January to mid February.  He relapsed on alcohol after leaving the program.  He last drank 1 week prior to starting IOP.  His heaviest intake of alcohol is 3-4 glasses of wine per day.  Hx of anxiety.         Consequences of use: Family negatively impacting relationships    Biomedical complications related to use: yes, patient verbalizes understanding of this        Motivation for change:   fair    Medical History:   Past Medical History:   Diagnosis Date    Kidney stone        Past Surgical History:   Procedure Laterality Date    BLADDER SURGERY      HERNIA REPAIR         No family history on file.    Social History     Socioeconomic History    Marital status:    Tobacco Use    Smoking status: Never    Smokeless tobacco: Never   Substance and Sexual Activity    Alcohol use: Yes    Drug use: Never    Sexual activity: Not Currently       Current Outpatient Medications   Medication Sig Dispense Refill    celecoxib (CELEBREX) 200 MG capsule Take 200 mg by mouth every morning.      cholecalciferol, vitamin D3, (VITAMIN D3) 25 mcg (1,000 unit) capsule Take 5,000 Units by mouth.      cyanocobalamin 500 MCG tablet Take 1 tablet by mouth every morning.      denosumab (PROLIA) 60 mg/mL Syrg Inject 60 mg into the skin.      eszopiclone (LUNESTA) 3 mg Tab Take 3 mg by mouth.      evolocumab (REPATHA SURECLICK) 140 mg/mL PnIj Inject 140 mg into the skin.      ezetimibe (ZETIA) 10 mg tablet Take 10 mg by mouth.      gabapentin (NEURONTIN) 300 MG  capsule Take by mouth.      hydroCHLOROthiazide (HYDRODIURIL) 50 MG tablet Take 50 mg by mouth.      hyoscyamine (LEVSIN) 0.125 mg TbDL Take by mouth.      hyoscyamine (LEVSIN) 0.125 mg TbDL Take 0.125 mg by mouth.      imiquimod (ALDARA) 5 % cream Apply 1 packet topically.      linaCLOtide (LINZESS) 290 mcg Cap capsule Take 290 mcg by mouth.      LINZESS 290 mcg Cap capsule Take 290 mcg by mouth.      LINZESS 72 mcg Cap capsule Take 72 mcg by mouth every morning.      metoprolol succinate (TOPROL-XL) 25 MG 24 hr tablet Take 25 mg by mouth.      metoprolol tartrate (LOPRESSOR) 50 MG tablet Take as directed the night before and 1 hour prior to CT.      metroNIDAZOLE (METROGEL) 0.75 % gel Apply 1 application  topically.      omega-3 fatty acids/fish oil (FISH OIL-OMEGA-3 FATTY ACIDS) 300-1,000 mg capsule Take 2 capsules by mouth every evening.      oxyCODONE (ROXICODONE) 5 MG immediate release tablet Take by mouth.      PERCOCET 5-325 mg per tablet Take 1 tablet by mouth every 6 (six) hours as needed.      potassium chloride (KLOR-CON) 10 MEQ TbSR Take 10 mEq by mouth.      rosuvastatin (CRESTOR) 40 MG Tab Take 40 mg by mouth every evening.      sertraline (ZOLOFT) 50 MG tablet Take 1 tablet by mouth every morning.      sodium bicarbonate 650 MG tablet Take 2 tablets (1,300 mg total) by mouth as needed (start before bladder installation to reduce irritation). 2 tabs night before bladder installation then 2 tabs the morning of the bladder installation 24 tablet 1    sulfamethoxazole-trimethoprim 800-160mg (BACTRIM DS) 800-160 mg Tab Take 1 tablet by mouth 2 (two) times daily.      tamsulosin (FLOMAX) 0.4 mg Cap Take 0.4 mg by mouth.      thiamine 100 MG tablet Take 100 mg by mouth every morning.      thiamine mononitrate, vit B1, (VITAMIN B-1, MONONITRATE,) 100 mg Tab Take 1 tablet by mouth every morning.      TURMERIC ORAL Take 1 capsule by mouth once daily.      valsartan (DIOVAN) 40 MG tablet Take 40 mg by mouth.       ZINC ORAL Take 1 tablet by mouth once daily.       No current facility-administered medications for this encounter.       Review of patient's allergies indicates:  No Known Allergies    Family History: (mental illness, substance abuse)  Patient was raised by his parents.  One younger sister.  Patient's sister has hx of alcohol and drug abuse.  Patient is .  Two daughters, one son.          Psychiatric History/Previous Substance Abuse TX (incluse response to past substance abuse tx):  Past Psychiatric History:   Medication management with PCP  Currently in treatment with: no current outpatient providers      Confucianist/Spiritual Orientation: Taoist (Evangelical)      Sexual/Physical Abuse (indicate if patient is abuser or the abused): patient denies     Sexual Orientation and History: heterosexual      History:  No    Legal Issues: none     Financial Status: patient owns the [employer]     Social, Peer Group, Living Situation, and Living Environment: Lives with spouse in [place], [place]    Education level: LAUREN     Stressors:Substance Abuse    Substance Use History: (include age of onset, duration, intensity, patterns of use, relapse history, and consequences of use for each substance): Alcohol-last drank a week prior to starting IOP.  At the most, patient would drink 3-4 glasses of wine daily.        Any Other Addictive Behaviors: none     Diagnoses:    Alcohol Use Disorder, severe, dependence   Anxiety, unspecified           As Evidenced by: Tolerance, Withdrawal, The substance is often taken in larger amounts or over a longer period of time than was intended., Persistent desire to cut down or control use., A great deal of time was spent in obtaining, using or recovering from the effects of a substance., Important social, occupational, or recreational activities were reduced or discontinued due to use., and The substance was used despite belief or knowledge of negative physical or  psychological consequences from its use.

## 2024-05-21 NOTE — PROGRESS NOTES
Group Psychotherapy (PhD/LCSW)     Site: [facility]     Clinical status of patient: Intensive Outpatient Program (IOP)     Date: 05/21/2024      Group Focus: Interpersonal Effectiveness     Length of service: 81847 - 45-60 minutes     Number of patients in attendance: 13     Referred by: [facility]      Target symptoms: Substance Abuse    Patient's response to treatment: Active Listening      Progress toward goals: Progressing adequately     Interval History: Session focus was Interpersonal Effectiveness: Recovering from Invalidation. Patient's identified types of invalidation and circumstances when invalidation was helpful. Patient's reviewed strategies for recovering from invalidation.     Diagnosis:     ICD-10-CM ICD-9-CM   1. Alcohol use disorder, moderate, dependence  F10.20 303.90   2. LINDSAY (generalized anxiety disorder)  F41.1 300.02            Plan: Continue treatment on ORP

## 2024-05-21 NOTE — PLAN OF CARE
05/20/24 1300   Activity/Group Therapy Checklist   Group Goals/Reflection   Attendance Attended   Follows Direction Followed directions   Group Interactions/Observations Interacted appropriately   Affect/Mood Range Normal range   Affect/Mood Display Appropriate   Goal Progression Progressing

## 2024-05-21 NOTE — PLAN OF CARE
"   05/21/24 2521   Activity/Group Therapy Checklist   Group Other (Comments)  (Triggers)   Attendance Attended   Follows Direction Followed directions   Group Interactions/Observations Interacted appropriately;Alert;Sharing;Supportive   Affect/Mood Range Normal range   Affect/Mood Display Appropriate   Goal Progression Progressing      facilitated group session, "Triggers". SW dsicussed how triggers can be almost anything and can push one to fall back into negative or harmful habits. SW discussed tips for dealing with triggers and asked pts to construct a plan that ideitfied strategies to 1) avoid or reduce strategies when faced with triggers and 2) strategies for dealing with triggers head on when they cannot be avoided. SW also discussed tips for dealing wuith triggers and tips for avoiding relapses.     "

## 2024-05-21 NOTE — PLAN OF CARE
05/21/24 1100   Activity/Group Therapy Checklist   Group Addiction Education   Attendance Attended   Follows Direction Followed directions   Group Interactions/Observations Interacted appropriately   Affect/Mood Range Normal range   Affect/Mood Display Appropriate   Goal Progression Progressing

## 2024-05-22 ENCOUNTER — HOSPITAL ENCOUNTER (OUTPATIENT)
Dept: PSYCHIATRY | Facility: HOSPITAL | Age: 79
Discharge: HOME | End: 2024-05-22
Attending: PSYCHIATRY & NEUROLOGY
Payer: MEDICARE

## 2024-05-22 DIAGNOSIS — F41.1 GAD (GENERALIZED ANXIETY DISORDER): ICD-10-CM

## 2024-05-22 DIAGNOSIS — F10.20 ALCOHOL USE DISORDER, MODERATE, DEPENDENCE: Primary | ICD-10-CM

## 2024-05-22 DIAGNOSIS — Z79.899 LONG-TERM USE OF HIGH-RISK MEDICATION: ICD-10-CM

## 2024-05-22 LAB
AMPHET+METHAMPHET UR QL: NEGATIVE
BARBITURATES UR QL SCN>200 NG/ML: NEGATIVE
BENZODIAZ UR QL SCN>200 NG/ML: NEGATIVE
BZE UR QL SCN: NEGATIVE
CANNABINOIDS UR QL SCN: NEGATIVE
CREAT UR-MCNC: 59 MG/DL (ref 23–375)
ETHANOL UR-MCNC: <10 MG/DL
ETHYL GLUCURONIDE: NEGATIVE NG/ML
METHADONE UR QL SCN>300 NG/ML: NEGATIVE
OPIATES UR QL SCN: NEGATIVE
PCP UR QL SCN>25 NG/ML: NEGATIVE
TOXICOLOGY INFORMATION: NORMAL

## 2024-05-22 PROCEDURE — 80307 DRUG TEST PRSMV CHEM ANLYZR: CPT | Performed by: PSYCHIATRY & NEUROLOGY

## 2024-05-22 PROCEDURE — 99232 SBSQ HOSP IP/OBS MODERATE 35: CPT | Mod: ,,, | Performed by: PSYCHIATRY & NEUROLOGY

## 2024-05-22 PROCEDURE — 90853 GROUP PSYCHOTHERAPY: CPT | Mod: ,,, | Performed by: PSYCHOLOGIST

## 2024-05-22 PROCEDURE — 90853 GROUP PSYCHOTHERAPY: CPT | Performed by: SOCIAL WORKER

## 2024-05-22 NOTE — PLAN OF CARE
05/22/24 1300   Activity/Group Therapy Checklist   Group Goals/Reflection   Attendance Attended   Follows Direction Followed directions   Group Interactions/Observations Interacted appropriately   Affect/Mood Range Normal range   Affect/Mood Display Appropriate   Goal Progression Progressing

## 2024-05-22 NOTE — PROGRESS NOTES
FOLLOW UP VISIT: [facility]  DATE OF ADMISSION: 5/7/24    ASSESSMENT AND PLAN:     DIAGNOSES & PROBLEMS:  Alcohol use disorder, severe  Generalized anxiety disorder     In Summary:  Pt is a 79yo male presenting to ORP today to continue treatment for alcohol use disorder.  Pt had presentation to ED in January 2024 which was found to be alcohol intoxication.  Since that event, pt went to inpatient detox/rehab at the insistence of his wife and daughter.  Pt reports relapse since discharge of a few drinks, but denies persistent or daily alcohol use.  Last drink was reportedly about a week prior to admission.  He feels that his current medications have been very helpful and denies medication side effects.  Pt is motivated to be in the program at this time due to the insistence of his wife and daughter.       Plan:  -Stop gabapentin 300mg bid  -Ctn Zoloft 100mg daily  -Ctn naltrexone 50mg daily        - continue ORP and program protocol; while in program will continue to monitor routine VS, breathalyzer and alcohol/drug screenings  - continue MAT as prescribed  - monitor random drug/alcohol screening  - routine monitoring of PETH levels to assess for maintenance of sobriety  - counseled on full abstinence from alcohol and substances of abuse (illicit and prescription)  - full engagement in 12 step (or equivalent) recovery program(s), including meeting attendance and acquisition/maintenance of sponsor     INTERVAL HISTORY AND ROS:     Monday night he went to an AA meeting in [place].  He was glad the meeting had more older people.  This was his first in person AA meting.  He identified a couple of people that he thinks might be good sponsors.  He enjoyed the meeting and is going to another meeting tomorrow night.  He admits he was a bit reluctant for AA, but says he is glad that he is going.  No cravings.  Feels medications are still working well.  He's still exercising in the afternoons, which he feels  has been helpful for sleep.                WITHDRAWAL SYMPTOMS: no  CRAVINGS: no    CURRENT PSYCHOTROPIC REGIMEN:  Zoloft 100mg daily  Gabapentin 300mg BID  Naltrexone 50mg daily      PERTINENT PAST HISTORY AND CHART REVIEW:   Per Chart:  Pt with presentation January 2024 to ED for slurred speech.  Stroke workup was negative and patient was found to have an EtOH level of 256.  Pt had driven from [place] himself for the evaluation.  Per chart review, his wife said that they were on a cruise recently a couple of weeks ago and said that he had been hiding bottles of alcohol from her. She thought that he had stopped drinking.        Per Patient:  Pt says since the pandemic he has been drinking much more heavily.  Alcohol use has increased since then.  IN September he fell and broke his elbow when on the golf [place].  Pt was untruthful with his wife who was concerned about his alcohol use around November or December.  In January he had a drink that morning and was very concerned he was having a seizure or something bad.  He was evaluated in the ED and found to have alcohol intoxication.  Daughter and wife insisted he needed inpatient detox.  He went to a facility in [place], [place] for 4 weeks mid January to mid February.  When coming home from the program, he started drinking some again.  Last drink was 1 week ago.  He did a couple of shots at that time, but didn't keep drinking because he didn't find it pleasurable.  Pt is on naltrexone and finds it to be helpful.  Gabapentin was also started while in rehab.  Since cutting back and recently stopping drinking, he feels physically much better.  He also sleeps well since he stopped.  When drinking at his heaviest, states he was drinking 3-4 glasses of wine once daily every day.  Denies having withdrawal symptoms when stopping.  Denies cravings.  He is not currently in a 12 step program.  Pt is in the ORP at the insistence of his wife and daughter, who feels he should  have more support.        Pt taking Zoloft 100mg daily.  This was originally started by his PCP after fracture of his elbow.  Pt says that his daughter specifically asked for him to be on Zoloft and he isn't sure why it was started.  He feels that helps him sleep because his mind isn't thinking as much when he tries to sleep.  Denies ever feeling sad/depressed or anxiety. Denies any side effects to current medications.       EXAMINATION:     There were no vitals taken for this visit.    MENTAL STATUS EXAMINATION:  General Appearance & Behavior:  adequately groomed, appropriately dressed, in no apparent distress, under good behavioral control  Involuntary Movements and Motor Activity:  no abnormal involuntary movements noted, no psychomotor agitation or retardation  Speech & Language: conversational, spontaneous, speaks and understands English proficiently  Mood: euthymic   Affect:  normal, euthymic, reactive, full-range, mood-congruent, appropriate to situation and context  Thought Process & Associations: linear and goal-directed, with no loosening of associations  Thought Content & Perceptions:  no suicidal or homicidal ideation, no evidence of psychosis  Sensorium and Cognition:  grossly intact, no significant deficits noted  Insight & Judgment:  intact, demonstrates awareness of illness and adequate/appropriate behavior given the circumstances      RISK MANAGEMENT:     I[]I Y  I[x]I N  I[]I U  I[]I A  Suicidal Ideation/Behavior:   I[]I Y  I[x]I N  I[]I U  I[]I A  Homicidal Ideation/Behavior:  I[]I Y  I[x]I N  I[]I U  I[]I A  Violence:  I[]I Y  I[x]I N  I[]I U  I[]I A  Self-Injurious Behavior:     The patient is deemed to be a reliable and factually accurate historian.    I[]I Y  I[x]I N  I[]I U  I[]I A  I[]I N/A  Minimization of Risk Parameters Suspected/Evident:   I[]I Y  I[x]I N  I[]I U  I[]I A  I[]I N/A  Exaggeration of Risk Parameters Suspected/Evident:       [] Y  [x] N  Danger to Self:   [] Y  [x] N   Danger to Others:   [] Y  [x] N  Grave Disability:     The patient was evaluated for psychiatric admission and found not to meet the criteria at this time.  The patient can be safely and effectively managed in a less restrictive level of care.    In cases of emergency, daily coverage provided by Acute/ER Psych MD, NP, PA, or SW, with contact numbers located in [facility] On Call Schedule.    Sanjana Murphy MD, PhD  [facility]  HO-4  05/22/2024      KEY:     I[]I Y = Yes / Present / Endorses  I[]I N = No / Absent / Denies  I[]I U = Unknown / Unable to Assess / Unwilling to Participate  I[]I A = Ambiguity Exists / Accuracy Uncertain  I[]I D = Denial or Minimization is Suspected/Evident  I[]I N/A = Non-Applicable    CHART REVIEW:     Available documentation has been reviewed, and pertinent elements of the chart - including previous psychiatric evaluations - have been incorporated into this evaluation where appropriate.    ADVICE AND COUNSELING:     [x] In cases of emergencies (e.g. SI/HI resulting in danger to self or others, functioning deteriorates to the level of grave disability), call 911 or [phone], or present to the emergency department for immediate assistance.  [x] Individuals should not operate a motor vehicle or heavy machinery if effects of medications or underlying symptoms/condition impair the ability to safely do so.    Alcohol, Tobacco, and Drug Counseling, as well as resources, has been provided, as warranted.     Shared medical decision making and informed consent are the hallmark and bedrock of good clinical care, and as such have been employed and obtained, respectively, to the degree possible.      Risk Mitigation Strategies, Harm Reduction Techniques, and Safety Netting are important interventions that can reduce acute and chronic risk, and as such have been employed to the degree possible.    Prescription Drug Management entails the review, recommendation, or  consideration without recommendation of medications, and as such was employed during the encounter.    Additional Psychoeducation has been provided, as warranted.    Discussed, to the extent possible, diagnosis, risks and benefits of proposed treatment vs alternative treatments vs no treatment, potential side effects of these treatments and the inherent unpredictability of treatment. The patient expresses understanding of the above and displays the capacity to agree with this treatment given said understanding. Patient also agrees that, currently, the benefits outweigh the risks and consents to treatment at this time.     Written material has been provided to supplement, augment, and reinforce any discussions and interventions, via the AVS or other pre-printed handouts, as warranted.      DIAGNOSTIC TESTING:     The chart was reviewed for recent diagnostic procedures and investigations, and pertinent results are noted below.     Glu 94  5/7/2024  Li *   *  TSH 0.698  1/14/2024    HgA1c 5.4  9/7/2023  VPA *   *   FT4 0.82  1/14/2024    Na 140  5/7/2024  CLZ *   *  WBC 7.16  1/14/2024    Cr 0.9  5/7/2024  ANC 4.5; 63.3;   1/14/2024   Hgb 14.8  1/14/2024     BUN 19  5/7/2024  Trop I *   *  HCT 42.6  1/14/2024     GFR >60.0  5/7/2024   CPK *   *    1/14/2024     Alb 4.5  5/7/2024   PRL *   *  B12 *   *     T Bili 1.4 (H)  5/7/2024  Chol 134  1/14/2024  B9 *   *    ALP 57  5/7/2024  TGs 144  1/14/2024  B1 *   *    AST 19  5/7/2024  HDL 76 (H)  1/14/2024  Vit D *   *     ALT 15  5/7/2024  LDL 29.2 (L)  1/14/2024  HIV Non-reactive  1/14/2024     INR 1.0  1/14/2024  Flor *   *   Hep C Non-reactive  1/14/2024    GGT *   *  Lip *   *  RPR *   *    MCV 95  1/14/2024   NH4 *   *  UPT *   *      PETH 77  5/7/2024  THC Negative  5/20/2024    ETOH 256 (H)  1/14/2024  DIONI Negative  5/20/2024    EtG Negative  5/20/2024  AMP Negative  5/20/2024    ALC <10  5/20/2024  OPI Negative   5/20/2024    BZO Negative  5/20/2024  MTD Negative  5/20/2024     BAR Negative  5/20/2024  BUP *   *    PCP Negative  5/20/2024  FEN *   *

## 2024-05-22 NOTE — PATIENT CARE CONFERENCE
Diagnoses:  Alcohol use disorder, severe  Generalized anxiety disorder     Progress:  Patient was staffed by team. Pt has been appropriate and cooperative in groups. Pt has offered feedback and support to other group members. Pt has not yet acquired a sponsor. Pt has been attending AA groups 4x per week. Pt has not completed his family meeting. No anticipated discharge date at this time. Team will continue to monitor pt's progress in the program.      Plan of Care:  Pt to continue ORP.    Aftercare/Follow ups  Med Management: TBD  Psychotherapy: TBD    Staff present:  MD Sanjana Gao MD Frank Riess, [facility]  Shelby Chacon, [facility]  Shelby Frye, SW  Jennifer Black, [facility]  LATESHA Lui

## 2024-05-22 NOTE — PROGRESS NOTES
Group Psychotherapy (PhD/LCSW)     Site: [facility]     Clinical status of patient: Intensive Outpatient Program (IOP)     Date: 05/22/2024      Group Focus: Mindfulness     Length of service: 65327 - 45-60 minutes     Number of patients in attendance: 12     Referred by: [facility]      Target symptoms: Substance Abuse    Patient's response to treatment: Active Listening      Progress toward goals: Progressing adequately     Interval History: Session focus was Mindfulness: Mindfulness Flag[place] and Kindness Meditation. Patient's were introduced to the [place] Kindness meditation skill. Patients discussed the uses of the meditation, how to practice the meditation, and participated in a loving kindness meditation during session.     Diagnosis:     ICD-10-CM ICD-9-CM   1. Alcohol use disorder, moderate, dependence  F10.20 303.90   2. LINDSAY (generalized anxiety disorder)  F41.1 300.02         Plan: Continue treatment on ORP

## 2024-05-23 ENCOUNTER — LAB VISIT (OUTPATIENT)
Dept: LAB | Facility: HOSPITAL | Age: 79
End: 2024-05-23
Attending: PSYCHIATRY & NEUROLOGY
Payer: MEDICARE

## 2024-05-23 ENCOUNTER — HOSPITAL ENCOUNTER (OUTPATIENT)
Dept: PSYCHIATRY | Facility: HOSPITAL | Age: 79
Discharge: HOME | End: 2024-05-23
Attending: PSYCHIATRY & NEUROLOGY
Payer: MEDICARE

## 2024-05-23 DIAGNOSIS — F10.20 ALCOHOL USE DISORDER, MODERATE, DEPENDENCE: Primary | ICD-10-CM

## 2024-05-23 DIAGNOSIS — F10.20 ALCOHOL USE DISORDER, MODERATE, DEPENDENCE: ICD-10-CM

## 2024-05-23 DIAGNOSIS — F41.1 GAD (GENERALIZED ANXIETY DISORDER): ICD-10-CM

## 2024-05-23 LAB
ALBUMIN SERPL BCP-MCNC: 4.2 G/DL (ref 3.5–5.2)
ALP SERPL-CCNC: 56 U/L (ref 55–135)
ALT SERPL W/O P-5'-P-CCNC: 12 U/L (ref 10–44)
ANION GAP SERPL CALC-SCNC: 8 MMOL/L (ref 8–16)
AST SERPL-CCNC: 17 U/L (ref 10–40)
BILIRUB SERPL-MCNC: 0.9 MG/DL (ref 0.1–1)
BUN SERPL-MCNC: 20 MG/DL (ref 8–23)
CALCIUM SERPL-MCNC: 10.1 MG/DL (ref 8.7–10.5)
CHLORIDE SERPL-SCNC: 104 MMOL/L (ref 95–110)
CO2 SERPL-SCNC: 26 MMOL/L (ref 23–29)
CREAT SERPL-MCNC: 0.8 MG/DL (ref 0.5–1.4)
EST. GFR  (NO RACE VARIABLE): >60 ML/MIN/1.73 M^2
GLUCOSE SERPL-MCNC: 98 MG/DL (ref 70–110)
POTASSIUM SERPL-SCNC: 4.1 MMOL/L (ref 3.5–5.1)
PROT SERPL-MCNC: 7.2 G/DL (ref 6–8.4)
SODIUM SERPL-SCNC: 138 MMOL/L (ref 136–145)

## 2024-05-23 PROCEDURE — 90853 GROUP PSYCHOTHERAPY: CPT | Mod: ,,, | Performed by: PSYCHOLOGIST

## 2024-05-23 PROCEDURE — 90853 GROUP PSYCHOTHERAPY: CPT

## 2024-05-23 PROCEDURE — 90853 GROUP PSYCHOTHERAPY: CPT | Performed by: SOCIAL WORKER

## 2024-05-23 PROCEDURE — 80053 COMPREHEN METABOLIC PANEL: CPT | Performed by: PSYCHIATRY & NEUROLOGY

## 2024-05-23 PROCEDURE — 90853 GROUP PSYCHOTHERAPY: CPT | Performed by: PSYCHOLOGIST

## 2024-05-23 PROCEDURE — 80321 ALCOHOLS BIOMARKERS 1OR 2: CPT | Performed by: PSYCHIATRY & NEUROLOGY

## 2024-05-23 NOTE — PLAN OF CARE
"   05/23/24 7803   Activity/Group Therapy Checklist   Group Other (Comments)  (Creative Therapy)   Attendance Attended   Follows Direction Followed directions   Group Interactions/Observations Interacted appropriately;Alert;Sharing;Supportive   Affect/Mood Range Normal range   Affect/Mood Display Appropriate   Goal Progression Progressing      facilitated creative session and dicussed with patients how creative therapy can be therapeutic to resolving anxiety and other stress-related issues. SW discussed activity: " Mask On, Mask Off". Patient's were able to draw and/ or color two blank masks and depict two versions of self. 1) Of how others perceive them and 2) of how they perceive themselves. SW discussed with pts how this was a form of self expression and an opportunity to use this creative as a way to have difficult conversations.   "

## 2024-05-24 ENCOUNTER — HOSPITAL ENCOUNTER (OUTPATIENT)
Dept: PSYCHIATRY | Facility: HOSPITAL | Age: 79
Discharge: HOME | End: 2024-05-24
Attending: PSYCHIATRY & NEUROLOGY
Payer: MEDICARE

## 2024-05-24 DIAGNOSIS — F41.1 GAD (GENERALIZED ANXIETY DISORDER): ICD-10-CM

## 2024-05-24 DIAGNOSIS — F10.20 ALCOHOL USE DISORDER, MODERATE, DEPENDENCE: Primary | ICD-10-CM

## 2024-05-24 DIAGNOSIS — Z79.899 LONG-TERM USE OF HIGH-RISK MEDICATION: ICD-10-CM

## 2024-05-24 LAB
AMPHET+METHAMPHET UR QL: NEGATIVE
BARBITURATES UR QL SCN>200 NG/ML: NEGATIVE
BENZODIAZ UR QL SCN>200 NG/ML: NEGATIVE
BZE UR QL SCN: NEGATIVE
CANNABINOIDS UR QL SCN: NEGATIVE
CREAT UR-MCNC: 54 MG/DL (ref 23–375)
ETHANOL UR-MCNC: <10 MG/DL
ETHYL GLUCURONIDE: NEGATIVE NG/ML
METHADONE UR QL SCN>300 NG/ML: NEGATIVE
OPIATES UR QL SCN: NEGATIVE
PCP UR QL SCN>25 NG/ML: NEGATIVE
TOXICOLOGY INFORMATION: NORMAL

## 2024-05-24 PROCEDURE — 90853 GROUP PSYCHOTHERAPY: CPT

## 2024-05-24 PROCEDURE — 99232 SBSQ HOSP IP/OBS MODERATE 35: CPT | Mod: ,,, | Performed by: PSYCHIATRY & NEUROLOGY

## 2024-05-24 PROCEDURE — 80307 DRUG TEST PRSMV CHEM ANLYZR: CPT | Performed by: PSYCHIATRY & NEUROLOGY

## 2024-05-24 NOTE — PROGRESS NOTES
Group Psychotherapy (PhD/LCSW)     Site: [facility]     Clinical status of patient: Intensive Outpatient Program (IOP)     Date: 05/23/2024      Group Focus: Emotion Regulation      Length of service: 29421 - 45-60 minutes     Number of patients in attendance: 12     Referred by: [facility]      Target symptoms: Substance Abuse     Patient's response to treatment: Active Listening      Progress toward goals: Progressing adequately     Interval History: Session focus was Emotion Regulation:  Check the Facts.  Patients were encouraged to understand what their emotions do for them (motivate them to action, communicate to themselves and others).  They were encouraged to check the facts to ensure their emotion intensity fits the situation.     Diagnosis:     ICD-10-CM ICD-9-CM   1. Alcohol use disorder, moderate, dependence  F10.20 303.90   2. LINDSAY (generalized anxiety disorder)  F41.1 300.02      Plan: Continue treatment on ORP

## 2024-05-24 NOTE — PLAN OF CARE
05/24/24 134   Activity/Group Therapy Checklist   Group Meditation/Relaxation   Attendance Attended   Follows Direction Followed directions   Group Interactions/Observations Interacted appropriately;Sharing;Supportive;Alert   Affect/Mood Range Normal range   Affect/Mood Display Appropriate   Goal Progression Progressing

## 2024-05-24 NOTE — PROGRESS NOTES
FOLLOW UP VISIT: [facility]  DATE OF ADMISSION: 5/7/24    ASSESSMENT AND PLAN:     DIAGNOSES & PROBLEMS:  Alcohol use disorder, severe  Generalized anxiety disorder     In Summary:  Pt is a 79yo male presenting to ORP today to continue treatment for alcohol use disorder.  Pt had presentation to ED in January 2024 which was found to be alcohol intoxication.  Since that event, pt went to inpatient detox/rehab at the insistence of his wife and daughter.  Pt reports relapse since discharge of a few drinks, but denies persistent or daily alcohol use.  Last drink was reportedly about a week prior to admission.  He feels that his current medications have been very helpful and denies medication side effects.  Pt is motivated to be in the program at this time due to the insistence of his wife and daughter.       Plan:  -Ctn Zoloft 100mg daily  -Ctn naltrexone 50mg daily        - continue ORP and program protocol; while in program will continue to monitor routine VS, breathalyzer and alcohol/drug screenings  - continue MAT as prescribed  - monitor random drug/alcohol screening  - routine monitoring of PETH levels to assess for maintenance of sobriety  - counseled on full abstinence from alcohol and substances of abuse (illicit and prescription)  - full engagement in 12 step (or equivalent) recovery program(s), including meeting attendance and acquisition/maintenance of sponsor     INTERVAL HISTORY AND ROS:     He went to 2 meetings this week and went to one Saturday.  He likes the in person AA meetings he has been going to in [place].  He does not yet have a sponsor, but says he will have one by next week.  Pt has stopped gabapentin  He doesn't notice any difference in how he feels since stopping a couple of days ago.  Energy is good.  Sleeping well.  He spoke to his wife about a family meeting and says that his wife doesn't feel comfortable.  He says his daughter is out of town for two weeks as well and  cannot come.  He says that a family meeting isn't going to happen.  No cravings.                WITHDRAWAL SYMPTOMS: no  CRAVINGS: no    CURRENT PSYCHOTROPIC REGIMEN:  Zoloft 100mg daily  Naltrexone 50mg daily      PERTINENT PAST HISTORY AND CHART REVIEW:   Per Chart:  Pt with presentation January 2024 to ED for slurred speech.  Stroke workup was negative and patient was found to have an EtOH level of 256.  Pt had driven from [place] himself for the evaluation.  Per chart review, his wife said that they were on a cruise recently a couple of weeks ago and said that he had been hiding bottles of alcohol from her. She thought that he had stopped drinking.        Per Patient:  Pt says since the pandemic he has been drinking much more heavily.  Alcohol use has increased since then.  IN September he fell and broke his elbow when on the golf course.  Pt was untruthful with his wife who was concerned about his alcohol use around November or December.  In January he had a drink that morning and was very concerned he was having a seizure or something bad.  He was evaluated in the ED and found to have alcohol intoxication.  Daughter and wife insisted he needed inpatient detox.  He went to a facility in [place], [place] for 4 weeks mid January to mid February.  When coming home from the program, he started drinking some again.  Last drink was 1 week ago.  He did a couple of shots at that time, but didn't keep drinking because he didn't find it pleasurable.  Pt is on naltrexone and finds it to be helpful.  Gabapentin was also started while in rehab.  Since cutting back and recently stopping drinking, he feels physically much better.  He also sleeps well since he stopped.  When drinking at his heaviest, states he was drinking 3-4 glasses of wine once daily every day.  Denies having withdrawal symptoms when stopping.  Denies cravings.  He is not currently in a 12 step program.  Pt is in the ORP at the insistence of his wife and  daughter, who feels he should have more support.        Pt taking Zoloft 100mg daily.  This was originally started by his PCP after fracture of his elbow.  Pt says that his daughter specifically asked for him to be on Zoloft and he isn't sure why it was started.  He feels that helps him sleep because his mind isn't thinking as much when he tries to sleep.  Denies ever feeling sad/depressed or anxiety. Denies any side effects to current medications.       EXAMINATION:     There were no vitals taken for this visit.    MENTAL STATUS EXAMINATION:  General Appearance & Behavior:  adequately groomed, appropriately dressed, in no apparent distress, under good behavioral control  Involuntary Movements and Motor Activity:  no abnormal involuntary movements noted, no psychomotor agitation or retardation  Speech & Language: conversational, spontaneous, speaks and understands English proficiently  Mood: euthymic   Affect:  normal, euthymic, reactive, full-range, mood-congruent, appropriate to situation and context  Thought Process & Associations: linear and goal-directed, with no loosening of associations  Thought Content & Perceptions:  no suicidal or homicidal ideation, no evidence of psychosis  Sensorium and Cognition:  grossly intact, no significant deficits noted  Insight & Judgment:  intact, demonstrates awareness of illness and adequate/appropriate behavior given the circumstances      RISK MANAGEMENT:     I[]I Y  I[x]I N  I[]I U  I[]I A  Suicidal Ideation/Behavior:   I[]I Y  I[x]I N  I[]I U  I[]I A  Homicidal Ideation/Behavior:  I[]I Y  I[x]I N  I[]I U  I[]I A  Violence:  I[]I Y  I[x]I N  I[]I U  I[]I A  Self-Injurious Behavior:     The patient is deemed to be a reliable and factually accurate historian.    I[]I Y  I[x]I N  I[]I U  I[]I A  I[]I N/A  Minimization of Risk Parameters Suspected/Evident:   I[]I Y  I[x]I N  I[]I U  I[]I A  I[]I N/A  Exaggeration of Risk Parameters Suspected/Evident:       [] Y  [x] N   Danger to Self:   [] Y  [x] N  Danger to Others:   [] Y  [x] N  Grave Disability:     The patient was evaluated for psychiatric admission and found not to meet the criteria at this time.  The patient can be safely and effectively managed in a less restrictive level of care.    In cases of emergency, daily coverage provided by Acute/ER Psych MD, NP, PA, or SW, with contact numbers located in [facility] On Call Schedule.    Sanjana Murphy MD, PhD  [facility]  -4  05/24/2024      KEY:     I[]I Y = Yes / Present / Endorses  I[]I N = No / Absent / Denies  I[]I U = Unknown / Unable to Assess / Unwilling to Participate  I[]I A = Ambiguity Exists / Accuracy Uncertain  I[]I D = Denial or Minimization is Suspected/Evident  I[]I N/A = Non-Applicable    CHART REVIEW:     Available documentation has been reviewed, and pertinent elements of the chart - including previous psychiatric evaluations - have been incorporated into this evaluation where appropriate.    ADVICE AND COUNSELING:     [x] In cases of emergencies (e.g. SI/HI resulting in danger to self or others, functioning deteriorates to the level of grave disability), call 911 or 988, or present to the emergency department for immediate assistance.  [x] Individuals should not operate a motor vehicle or heavy machinery if effects of medications or underlying symptoms/condition impair the ability to safely do so.    Alcohol, Tobacco, and Drug Counseling, as well as resources, has been provided, as warranted.     Shared medical decision making and informed consent are the hallmark and bedrock of good clinical care, and as such have been employed and obtained, respectively, to the degree possible.      Risk Mitigation Strategies, Harm Reduction Techniques, and Safety Netting are important interventions that can reduce acute and chronic risk, and as such have been employed to the degree possible.    Prescription Drug Management entails the  review, recommendation, or consideration without recommendation of medications, and as such was employed during the encounter.    Additional Psychoeducation has been provided, as warranted.    Discussed, to the extent possible, diagnosis, risks and benefits of proposed treatment vs alternative treatments vs no treatment, potential side effects of these treatments and the inherent unpredictability of treatment. The patient expresses understanding of the above and displays the capacity to agree with this treatment given said understanding. Patient also agrees that, currently, the benefits outweigh the risks and consents to treatment at this time.     Written material has been provided to supplement, augment, and reinforce any discussions and interventions, via the AVS or other pre-printed handouts, as warranted.      DIAGNOSTIC TESTING:     The chart was reviewed for recent diagnostic procedures and investigations, and pertinent results are noted below.     Glu 98  5/23/2024  Li *   *  TSH 0.698  1/14/2024    HgA1c 5.4  9/7/2023  VPA *   *   FT4 0.82  1/14/2024    Na 138  5/23/2024  CLZ *   *  WBC 7.16  1/14/2024    Cr 0.8  5/23/2024  ANC 4.5; 63.3;   1/14/2024   Hgb 14.8  1/14/2024     BUN 20  5/23/2024  Trop I *   *  HCT 42.6  1/14/2024     GFR >60.0  5/23/2024   CPK *   *    1/14/2024     Alb 4.2  5/23/2024   PRL *   *  B12 *   *     T Bili 0.9  5/23/2024  Chol 134  1/14/2024  B9 *   *    ALP 56  5/23/2024  TGs 144  1/14/2024  B1 *   *    AST 17  5/23/2024  HDL 76 (H)  1/14/2024  Vit D *   *     ALT 12  5/23/2024  LDL 29.2 (L)  1/14/2024  HIV Non-reactive  1/14/2024     INR 1.0  1/14/2024  Flor *   *   Hep C Non-reactive  1/14/2024    GGT *   *  Lip *   *  RPR *   *    MCV 95  1/14/2024   NH4 *   *  UPT *   *      PETH 77  5/7/2024  THC Negative  5/22/2024    ETOH 256 (H)  1/14/2024  DIONI Negative  5/22/2024    EtG Negative  5/20/2024  AMP Negative  5/22/2024     ALC <10  5/22/2024  OPI Negative  5/22/2024    BZO Negative  5/22/2024  MTD Negative  5/22/2024     BAR Negative  5/22/2024  BUP *   *    PCP Negative  5/22/2024  FEN *   *

## 2024-05-24 NOTE — PROGRESS NOTES
Group Psychotherapy (PhD/LCSW)     Site: [facility]     Clinical status of patient: Intensive Outpatient Program (IOP)     Date: 05/23/2024      Group Focus: Principles of Recovery      Length of service: 60231 - 45-60 minutes     Number of patients in attendance: 5     Referred by: [facility]      Target symptoms: Substance Abuse     Patient's response to treatment: Active Listening      Progress toward goals: Progressing adequately     Interval History: Session focused on strategies for rebuilding trust in relationships with significant others that have been damaged by the pt's substance abuse and related behavior.      Diagnosis:       ICD-10-CM ICD-9-CM   1. Alcohol use disorder, moderate, dependence  F10.20 303.90   2. LINDSAY (generalized anxiety disorder)  F41.1 300.02       Plan: Continue treatment on ORP

## 2024-05-25 LAB
CLINICAL BIOCHEMIST REVIEW: NORMAL
ETHYL GLUCURONIDE: NEGATIVE NG/ML
PLPETH BLD-MCNC: <10 NG/ML
POPETH BLD-MCNC: 18 NG/ML

## 2024-05-26 ENCOUNTER — PATIENT MESSAGE (OUTPATIENT)
Dept: INTERNAL MEDICINE | Facility: CLINIC | Age: 79
End: 2024-05-26
Payer: COMMERCIAL

## 2024-05-26 NOTE — PLAN OF CARE
05/23/24 1400   Activity/Group Therapy Checklist   Group Goals/Reflection   Attendance Attended   Follows Direction Followed directions   Group Interactions/Observations Interacted appropriately   Affect/Mood Range Normal range   Affect/Mood Display Appropriate   Goal Progression Progressing

## 2024-05-27 ENCOUNTER — HOSPITAL ENCOUNTER (OUTPATIENT)
Dept: PSYCHIATRY | Facility: HOSPITAL | Age: 79
Discharge: HOME | End: 2024-05-27
Attending: PSYCHIATRY & NEUROLOGY
Payer: MEDICARE

## 2024-05-27 VITALS — SYSTOLIC BLOOD PRESSURE: 119 MMHG | DIASTOLIC BLOOD PRESSURE: 62 MMHG | HEART RATE: 62 BPM

## 2024-05-27 DIAGNOSIS — F10.20 ALCOHOL USE DISORDER, MODERATE, DEPENDENCE: Primary | ICD-10-CM

## 2024-05-27 DIAGNOSIS — Z79.899 LONG-TERM USE OF HIGH-RISK MEDICATION: ICD-10-CM

## 2024-05-27 DIAGNOSIS — F41.1 GAD (GENERALIZED ANXIETY DISORDER): ICD-10-CM

## 2024-05-27 PROCEDURE — 80307 DRUG TEST PRSMV CHEM ANLYZR: CPT | Performed by: PSYCHIATRY & NEUROLOGY

## 2024-05-27 PROCEDURE — 99232 SBSQ HOSP IP/OBS MODERATE 35: CPT | Mod: ,,, | Performed by: PSYCHIATRY & NEUROLOGY

## 2024-05-27 PROCEDURE — 90853 GROUP PSYCHOTHERAPY: CPT

## 2024-05-27 NOTE — PROGRESS NOTES
FOLLOW UP VISIT: [facility]  DATE OF ADMISSION: 5/7/24    ASSESSMENT AND PLAN:     DIAGNOSES & PROBLEMS:  Alcohol use disorder, severe  Generalized anxiety disorder     In Summary:  Pt is a 79yo male presenting to ORP today to continue treatment for alcohol use disorder.  Pt had presentation to ED in January 2024 which was found to be alcohol intoxication.  Since that event, pt went to inpatient detox/rehab at the insistence of his wife and daughter.  Pt reports relapse since discharge of a few drinks, but denies persistent or daily alcohol use.  Last drink was reportedly about a week prior to admission.  He feels that his current medications have been very helpful and denies medication side effects.  Pt is motivated to be in the program at this time due to the insistence of his wife and daughter.       Plan:  -Ctn Zoloft 100mg daily  -Ctn naltrexone 50mg daily        - continue ORP and program protocol; while in program will continue to monitor routine VS, breathalyzer and alcohol/drug screenings  - continue MAT as prescribed  - monitor random drug/alcohol screening  - routine monitoring of PETH levels to assess for maintenance of sobriety  - counseled on full abstinence from alcohol and substances of abuse (illicit and prescription)  - full engagement in 12 step (or equivalent) recovery program(s), including meeting attendance and acquisition/maintenance of sponsor     INTERVAL HISTORY AND ROS:     Saturday evening he went to another AA meeting, which he enjoyed. Last week he went to 3 AA meetings.  This week he plans to attend 4 meetings.  He hasn't shared anything in the meetings yet, but thinks he may share soon.  Tonight he plans to ask about a sponsor.  No cravings for alcohol.  Family has been his major motivation for going to [facility].  He thinks that family would agree he has been more easy going lately.                WITHDRAWAL SYMPTOMS: no  CRAVINGS: no    CURRENT PSYCHOTROPIC REGIMEN:  Zoloft  100mg daily  Naltrexone 50mg daily      PERTINENT PAST HISTORY AND CHART REVIEW:   Per Chart:  Pt with presentation January 2024 to ED for slurred speech.  Stroke workup was negative and patient was found to have an EtOH level of 256.  Pt had driven from [place] himself for the evaluation.  Per chart review, his wife said that they were on a cruise recently a couple of weeks ago and said that he had been hiding bottles of alcohol from her. She thought that he had stopped drinking.        Per Patient:  Pt says since the pandemic he has been drinking much more heavily.  Alcohol use has increased since then.  IN September he fell and broke his elbow when on the golf [place].  Pt was untruthful with his wife who was concerned about his alcohol use around November or December.  In January he had a drink that morning and was very concerned he was having a seizure or something bad.  He was evaluated in the ED and found to have alcohol intoxication.  Daughter and wife insisted he needed inpatient detox.  He went to a facility in [place], [place] for 4 weeks mid January to mid February.  When coming home from the program, he started drinking some again.  Last drink was 1 week ago.  He did a couple of shots at that time, but didn't keep drinking because he didn't find it pleasurable.  Pt is on naltrexone and finds it to be helpful.  Gabapentin was also started while in rehab.  Since cutting back and recently stopping drinking, he feels physically much better.  He also sleeps well since he stopped.  When drinking at his heaviest, states he was drinking 3-4 glasses of wine once daily every day.  Denies having withdrawal symptoms when stopping.  Denies cravings.  He is not currently in a 12 step program.  Pt is in the ORP at the insistence of his wife and daughter, who feels he should have more support.        Pt taking Zoloft 100mg daily.  This was originally started by his PCP after fracture of his elbow.  Pt says that his  daughter specifically asked for him to be on Zoloft and he isn't sure why it was started.  He feels that helps him sleep because his mind isn't thinking as much when he tries to sleep.  Denies ever feeling sad/depressed or anxiety. Denies any side effects to current medications.       EXAMINATION:     There were no vitals taken for this visit.    MENTAL STATUS EXAMINATION:  General Appearance & Behavior:  adequately groomed, appropriately dressed, in no apparent distress, under good behavioral control  Involuntary Movements and Motor Activity:  no abnormal involuntary movements noted, no psychomotor agitation or retardation  Speech & Language: conversational, spontaneous, speaks and understands English proficiently  Mood: euthymic   Affect:  normal, euthymic, reactive, full-range, mood-congruent, appropriate to situation and context  Thought Process & Associations: linear and goal-directed, with no loosening of associations  Thought Content & Perceptions:  no suicidal or homicidal ideation, no evidence of psychosis  Sensorium and Cognition:  grossly intact, no significant deficits noted  Insight & Judgment:  intact, demonstrates awareness of illness and adequate/appropriate behavior given the circumstances      RISK MANAGEMENT:     I[]I Y  I[x]I N  I[]I U  I[]I A  Suicidal Ideation/Behavior:   I[]I Y  I[x]I N  I[]I U  I[]I A  Homicidal Ideation/Behavior:  I[]I Y  I[x]I N  I[]I U  I[]I A  Violence:  I[]I Y  I[x]I N  I[]I U  I[]I A  Self-Injurious Behavior:     The patient is deemed to be a reliable and factually accurate historian.    I[]I Y  I[x]I N  I[]I U  I[]I A  I[]I N/A  Minimization of Risk Parameters Suspected/Evident:   I[]I Y  I[x]I N  I[]I U  I[]I A  I[]I N/A  Exaggeration of Risk Parameters Suspected/Evident:       [] Y  [x] N  Danger to Self:   [] Y  [x] N  Danger to Others:   [] Y  [x] N  Grave Disability:     The patient was evaluated for psychiatric admission and found not to meet the  criteria at this time.  The patient can be safely and effectively managed in a less restrictive level of care.    In cases of emergency, daily coverage provided by Acute/ER Psych MD, NP, PA, or SW, with contact numbers located in [facility] On Call Schedule.    Sanjana Murphy MD, PhD  [facility]  -4  05/27/2024      KEY:     I[]I Y = Yes / Present / Endorses  I[]I N = No / Absent / Denies  I[]I U = Unknown / Unable to Assess / Unwilling to Participate  I[]I A = Ambiguity Exists / Accuracy Uncertain  I[]I D = Denial or Minimization is Suspected/Evident  I[]I N/A = Non-Applicable    CHART REVIEW:     Available documentation has been reviewed, and pertinent elements of the chart - including previous psychiatric evaluations - have been incorporated into this evaluation where appropriate.    ADVICE AND COUNSELING:     [x] In cases of emergencies (e.g. SI/HI resulting in danger to self or others, functioning deteriorates to the level of grave disability), call 911 or 988, or present to the emergency department for immediate assistance.  [x] Individuals should not operate a motor vehicle or heavy machinery if effects of medications or underlying symptoms/condition impair the ability to safely do so.    Alcohol, Tobacco, and Drug Counseling, as well as resources, has been provided, as warranted.     Shared medical decision making and informed consent are the hallmark and bedrock of good clinical care, and as such have been employed and obtained, respectively, to the degree possible.      Risk Mitigation Strategies, Harm Reduction Techniques, and Safety Netting are important interventions that can reduce acute and chronic risk, and as such have been employed to the degree possible.    Prescription Drug Management entails the review, recommendation, or consideration without recommendation of medications, and as such was employed during the encounter.    Additional Psychoeducation has been provided,  as warranted.    Discussed, to the extent possible, diagnosis, risks and benefits of proposed treatment vs alternative treatments vs no treatment, potential side effects of these treatments and the inherent unpredictability of treatment. The patient expresses understanding of the above and displays the capacity to agree with this treatment given said understanding. Patient also agrees that, currently, the benefits outweigh the risks and consents to treatment at this time.     Written material has been provided to supplement, augment, and reinforce any discussions and interventions, via the AVS or other pre-printed handouts, as warranted.      DIAGNOSTIC TESTING:     The chart was reviewed for recent diagnostic procedures and investigations, and pertinent results are noted below.     Glu 98  5/23/2024  Li *   *  TSH 0.698  1/14/2024    HgA1c 5.4  9/7/2023  VPA *   *   FT4 0.82  1/14/2024    Na 138  5/23/2024  CLZ *   *  WBC 7.16  1/14/2024    Cr 0.8  5/23/2024  ANC 4.5; 63.3;   1/14/2024   Hgb 14.8  1/14/2024     BUN 20  5/23/2024  Trop I *   *  HCT 42.6  1/14/2024     GFR >60.0  5/23/2024   CPK *   *    1/14/2024     Alb 4.2  5/23/2024   PRL *   *  B12 *   *     T Bili 0.9  5/23/2024  Chol 134  1/14/2024  B9 *   *    ALP 56  5/23/2024  TGs 144  1/14/2024  B1 *   *    AST 17  5/23/2024  HDL 76 (H)  1/14/2024  Vit D *   *     ALT 12  5/23/2024  LDL 29.2 (L)  1/14/2024  HIV Non-reactive  1/14/2024     INR 1.0  1/14/2024  Flor *   *   Hep C Non-reactive  1/14/2024    GGT *   *  Lip *   *  RPR *   *    MCV 95  1/14/2024   NH4 *   *  UPT *   *      PETH 18  5/23/2024  THC Negative  5/24/2024    ETOH 256 (H)  1/14/2024  DIONI Negative  5/24/2024    EtG Negative  5/24/2024  AMP Negative  5/24/2024    ALC <10  5/24/2024  OPI Negative  5/24/2024    BZO Negative  5/24/2024  MTD Negative  5/24/2024     BAR Negative  5/24/2024  BUP *   *    PCP Negative  5/24/2024  FEN *    *

## 2024-05-27 NOTE — PLAN OF CARE
"   05/27/24 1440   Activity/Group Therapy Checklist   Group Other (Comments)  (Strengths Exercise)   Attendance Attended   Follows Direction Followed directions   Group Interactions/Observations Interacted appropriately;Alert;Sharing;Supportive   Affect/Mood Range Normal range   Affect/Mood Display Appropriate   Goal Progression Progressing      facilitated strengths group activity. SW discussed with pts activity, " Three Good People". Sw discussed identifying strengths and how those strengths have been used to overcome obstacles in someone that inspires them, a fictional character and self. SW discussed that by identifying personal strengths can help to improve self esteem and assist in completing goals.  "

## 2024-05-27 NOTE — PROGRESS NOTES
Group Psychotherapy (PhD/LCSW)     Site: [facility]     Clinical status of patient: Intensive Outpatient Program (IOP)     Date: 05/27/2024      Group Focus: Sleep 101     Length of service: 93156 - 45-60 minutes     Number of patients in attendance: 12     Referred by: [facility]      Target symptoms: Substance Abuse     Patient's response to treatment: Active Listening      Progress toward goals: Progressing adequately     Interval History: Session focus was on cognitive strategies for addressing negative sleep thoughts contributing to poor sleep. Patients discovered the connection between NSTs and anxiety and arousal. Patients learned to identify and categorize their NSTs into unhelpful thinking patterns. Patients also were taught how to challenge NSTs and replace NSTs with more helpful thoughts about sleep. Clinician reviewed the Sleep 101 course including information about sleep, the stimulus control and sleep compression interventions, practices for sleep hygiene and relaxation, and uses of helpful thinking. Patients developed schedules for practicing Sleep 101 skills in the future.     Diagnosis:     ICD-10-CM ICD-9-CM   1. Alcohol use disorder, moderate, dependence  F10.20 303.90   2. LINDSAY (generalized anxiety disorder)  F41.1 300.02         Plan: Continue treatment on ORP

## 2024-05-27 NOTE — PLAN OF CARE
05/27/24 1419   Activity/Group Therapy Checklist   Group Other (Comments)  (Processing Group)   Attendance Attended   Follows Direction Followed directions   Group Interactions/Observations Interacted appropriately;Alert;Sharing;Supportive   Affect/Mood Range Normal range   Affect/Mood Display Appropriate   Goal Progression Progressing

## 2024-05-27 NOTE — PROGRESS NOTES
Group Psychotherapy (PhD/LCSW)     Site: [facility]     Clinical status of patient: Intensive Outpatient Program (IOP)     Date: 05/27/2024      Group Focus: Distress Tolerance     Length of service: 69031 - 45-50 minutes     Number of patients in attendance: 12     Referred by: [facility]      Target symptoms: Alcohol Abuse     Patient's response to treatment: Active Listening     Progress toward goals: Progressing adequately     Interval History: Session focus was Distress Tolerance:  TIP skill.  Patients were encouraged to use temperature, intense exercise, paced breathing, or paired muscle relaxation to reduce intensity of distress.     Diagnosis:     ICD-10-CM ICD-9-CM   1. Alcohol use disorder, moderate, dependence  F10.20 303.90   2. LINDSAY (generalized anxiety disorder)  F41.1 300.02         Plan: Continue treatment on ORP

## 2024-05-28 ENCOUNTER — HOSPITAL ENCOUNTER (OUTPATIENT)
Dept: PSYCHIATRY | Facility: HOSPITAL | Age: 79
Discharge: HOME | End: 2024-05-28
Attending: PSYCHIATRY & NEUROLOGY
Payer: MEDICARE

## 2024-05-28 DIAGNOSIS — F10.20 ALCOHOL USE DISORDER, MODERATE, DEPENDENCE: Primary | ICD-10-CM

## 2024-05-28 DIAGNOSIS — F41.1 GAD (GENERALIZED ANXIETY DISORDER): ICD-10-CM

## 2024-05-28 PROCEDURE — 90853 GROUP PSYCHOTHERAPY: CPT | Performed by: SOCIAL WORKER

## 2024-05-28 NOTE — PROGRESS NOTES
Group Psychotherapy (PhD/LCSW)     Site: [facility]     Clinical status of patient: Intensive Outpatient Program (IOP)     Date: 05/28/2024      Group Focus: Interpersonal Effectiveness     Length of service: 26952 - 45-50 minutes     Number of patients in attendance: 11     Referred by: [facility]      Target symptoms: Alcohol Abuse     Patient's response to treatment: Active Listening     Progress toward goals: Progressing adequately     Interval History: Session focus was Interpersonal Effectiveness:  DEAR MAN.  Patients were encouraged to exercise skillfulness during interactions by using this framework.     Diagnosis:     ICD-10-CM ICD-9-CM   1. Alcohol use disorder, moderate, dependence  F10.20 303.90   2. LINDSAY (generalized anxiety disorder)  F41.1 300.02         Plan: Continue treatment on ORP

## 2024-05-28 NOTE — PLAN OF CARE
05/28/24 1100   Activity/Group Therapy Checklist   Group Addiction Education   Attendance Attended   Follows Direction Followed directions   Group Interactions/Observations Interacted appropriately   Affect/Mood Range Normal range   Affect/Mood Display Appropriate   Goal Progression Progressing

## 2024-05-29 ENCOUNTER — HOSPITAL ENCOUNTER (OUTPATIENT)
Dept: PSYCHIATRY | Facility: HOSPITAL | Age: 79
Discharge: HOME | End: 2024-05-29
Attending: PSYCHIATRY & NEUROLOGY
Payer: MEDICARE

## 2024-05-29 DIAGNOSIS — Z79.899 LONG-TERM USE OF HIGH-RISK MEDICATION: ICD-10-CM

## 2024-05-29 DIAGNOSIS — F41.1 GAD (GENERALIZED ANXIETY DISORDER): ICD-10-CM

## 2024-05-29 DIAGNOSIS — F10.20 ALCOHOL USE DISORDER, MODERATE, DEPENDENCE: Primary | ICD-10-CM

## 2024-05-29 LAB
AMPHET+METHAMPHET UR QL: NEGATIVE
BARBITURATES UR QL SCN>200 NG/ML: NEGATIVE
BENZODIAZ UR QL SCN>200 NG/ML: NEGATIVE
BZE UR QL SCN: NEGATIVE
CANNABINOIDS UR QL SCN: NEGATIVE
CREAT UR-MCNC: 47 MG/DL (ref 23–375)
ETHANOL UR-MCNC: <10 MG/DL
ETHYL GLUCURONIDE: NEGATIVE NG/ML
METHADONE UR QL SCN>300 NG/ML: NEGATIVE
OPIATES UR QL SCN: NEGATIVE
PCP UR QL SCN>25 NG/ML: NEGATIVE
TOXICOLOGY INFORMATION: NORMAL

## 2024-05-29 PROCEDURE — 90853 GROUP PSYCHOTHERAPY: CPT | Performed by: SOCIAL WORKER

## 2024-05-29 PROCEDURE — 80307 DRUG TEST PRSMV CHEM ANLYZR: CPT | Performed by: PSYCHIATRY & NEUROLOGY

## 2024-05-29 PROCEDURE — 99232 SBSQ HOSP IP/OBS MODERATE 35: CPT | Mod: ,,, | Performed by: PSYCHIATRY & NEUROLOGY

## 2024-05-29 PROCEDURE — 90853 GROUP PSYCHOTHERAPY: CPT | Mod: ,,, | Performed by: PSYCHOLOGIST

## 2024-05-29 PROCEDURE — 90853 GROUP PSYCHOTHERAPY: CPT

## 2024-05-29 NOTE — PROGRESS NOTES
FOLLOW UP VISIT: [facility]  DATE OF ADMISSION: 5/7/24    ASSESSMENT AND PLAN:     DIAGNOSES & PROBLEMS:  Alcohol use disorder, severe  Generalized anxiety disorder     In Summary:  Pt is a 77yo male presenting to ORP today to continue treatment for alcohol use disorder.  Pt had presentation to ED in January 2024 which was found to be alcohol intoxication.  Since that event, pt went to inpatient detox/rehab at the insistence of his wife and daughter.  Pt reports relapse since discharge of a few drinks, but denies persistent or daily alcohol use.  Last drink was reportedly about a week prior to admission.  He feels that his current medications have been very helpful and denies medication side effects.  Pt is motivated to be in the program at this time due to the insistence of his wife and daughter.       Plan:  -Ctn Zoloft 100mg daily  -Ctn naltrexone 50mg daily        - continue ORP and program protocol; while in program will continue to monitor routine VS, breathalyzer and alcohol/drug screenings  - continue MAT as prescribed  - monitor random drug/alcohol screening  - routine monitoring of PETH levels to assess for maintenance of sobriety  - counseled on full abstinence from alcohol and substances of abuse (illicit and prescription)  - full engagement in 12 step (or equivalent) recovery program(s), including meeting attendance and acquisition/maintenance of sponsor     INTERVAL HISTORY AND ROS:     Overall doing well.  He went to AA twice this week already, which he says he enjoyed.  He feels that he will continue AA long-term.  This Saturday night he is thinking to go to dinner with the AA group.  He does not have a sponsor yet, but is planning to get one this week.  No cravings.  He's still exercising.  He feels physically better since he stopped drinking and says he lost about 15lbs.               WITHDRAWAL SYMPTOMS: no  CRAVINGS: no    CURRENT PSYCHOTROPIC REGIMEN:  Zoloft 100mg  daily  Naltrexone 50mg daily      PERTINENT PAST HISTORY AND CHART REVIEW:   Per Chart:  Pt with presentation January 2024 to ED for slurred speech.  Stroke workup was negative and patient was found to have an EtOH level of 256.  Pt had driven from [place] himself for the evaluation.  Per chart review, his wife said that they were on a cruise recently a couple of weeks ago and said that he had been hiding bottles of alcohol from her. She thought that he had stopped drinking.        Per Patient:  Pt says since the pandemic he has been drinking much more heavily.  Alcohol use has increased since then.  IN September he fell and broke his elbow when on the golf course.  Pt was untruthful with his wife who was concerned about his alcohol use around November or December.  In January he had a drink that morning and was very concerned he was having a seizure or something bad.  He was evaluated in the ED and found to have alcohol intoxication.  Daughter and wife insisted he needed inpatient detox.  He went to a facility in [place], [place] for 4 weeks mid January to mid February.  When coming home from the program, he started drinking some again.  Last drink was 1 week ago.  He did a couple of shots at that time, but didn't keep drinking because he didn't find it pleasurable.  Pt is on naltrexone and finds it to be helpful.  Gabapentin was also started while in rehab.  Since cutting back and recently stopping drinking, he feels physically much better.  He also sleeps well since he stopped.  When drinking at his heaviest, states he was drinking 3-4 glasses of wine once daily every day.  Denies having withdrawal symptoms when stopping.  Denies cravings.  He is not currently in a 12 step program.  Pt is in the ORP at the insistence of his wife and daughter, who feels he should have more support.        Pt taking Zoloft 100mg daily.  This was originally started by his PCP after fracture of his elbow.  Pt says that his daughter  specifically asked for him to be on Zoloft and he isn't sure why it was started.  He feels that helps him sleep because his mind isn't thinking as much when he tries to sleep.  Denies ever feeling sad/depressed or anxiety. Denies any side effects to current medications.       EXAMINATION:     There were no vitals taken for this visit.    MENTAL STATUS EXAMINATION:  General Appearance & Behavior:  adequately groomed, appropriately dressed, in no apparent distress, under good behavioral control  Involuntary Movements and Motor Activity:  no abnormal involuntary movements noted, no psychomotor agitation or retardation  Speech & Language: conversational, spontaneous, speaks and understands English proficiently  Mood: good  Affect:  normal, euthymic, reactive, full-range, mood-congruent, appropriate to situation and context  Thought Process & Associations: linear and goal-directed, with no loosening of associations  Thought Content & Perceptions:  no suicidal or homicidal ideation, no evidence of psychosis  Sensorium and Cognition:  grossly intact, no significant deficits noted  Insight & Judgment:  intact, demonstrates awareness of illness and adequate/appropriate behavior given the circumstances      RISK MANAGEMENT:     I[]I Y  I[x]I N  I[]I U  I[]I A  Suicidal Ideation/Behavior:   I[]I Y  I[x]I N  I[]I U  I[]I A  Homicidal Ideation/Behavior:  I[]I Y  I[x]I N  I[]I U  I[]I A  Violence:  I[]I Y  I[x]I N  I[]I U  I[]I A  Self-Injurious Behavior:     The patient is deemed to be a reliable and factually accurate historian.    I[]I Y  I[x]I N  I[]I U  I[]I A  I[]I N/A  Minimization of Risk Parameters Suspected/Evident:   I[]I Y  I[x]I N  I[]I U  I[]I A  I[]I N/A  Exaggeration of Risk Parameters Suspected/Evident:       [] Y  [x] N  Danger to Self:   [] Y  [x] N  Danger to Others:   [] Y  [x] N  Grave Disability:     The patient was evaluated for psychiatric admission and found not to meet the criteria at this  time.  The patient can be safely and effectively managed in a less restrictive level of care.    In cases of emergency, daily coverage provided by Acute/ER Psych MD, NP, PA, or SW, with contact numbers located in [facility] On Call Schedule.    Sanjana Murphy MD, PhD  [facility]  HO-4  05/29/2024      KEY:     I[]I Y = Yes / Present / Endorses  I[]I N = No / Absent / Denies  I[]I U = Unknown / Unable to Assess / Unwilling to Participate  I[]I A = Ambiguity Exists / Accuracy Uncertain  I[]I D = Denial or Minimization is Suspected/Evident  I[]I N/A = Non-Applicable    CHART REVIEW:     Available documentation has been reviewed, and pertinent elements of the chart - including previous psychiatric evaluations - have been incorporated into this evaluation where appropriate.    ADVICE AND COUNSELING:     [x] In cases of emergencies (e.g. SI/HI resulting in danger to self or others, functioning deteriorates to the level of grave disability), call 911 or [phone], or present to the emergency department for immediate assistance.  [x] Individuals should not operate a motor vehicle or heavy machinery if effects of medications or underlying symptoms/condition impair the ability to safely do so.    Alcohol, Tobacco, and Drug Counseling, as well as resources, has been provided, as warranted.     Shared medical decision making and informed consent are the hallmark and bedrock of good clinical care, and as such have been employed and obtained, respectively, to the degree possible.      Risk Mitigation Strategies, Harm Reduction Techniques, and Safety Netting are important interventions that can reduce acute and chronic risk, and as such have been employed to the degree possible.    Prescription Drug Management entails the review, recommendation, or consideration without recommendation of medications, and as such was employed during the encounter.    Additional Psychoeducation has been provided, as  warranted.    Discussed, to the extent possible, diagnosis, risks and benefits of proposed treatment vs alternative treatments vs no treatment, potential side effects of these treatments and the inherent unpredictability of treatment. The patient expresses understanding of the above and displays the capacity to agree with this treatment given said understanding. Patient also agrees that, currently, the benefits outweigh the risks and consents to treatment at this time.     Written material has been provided to supplement, augment, and reinforce any discussions and interventions, via the AVS or other pre-printed handouts, as warranted.      DIAGNOSTIC TESTING:     The chart was reviewed for recent diagnostic procedures and investigations, and pertinent results are noted below.     Glu 98  5/23/2024  Li *   *  TSH 0.698  1/14/2024    HgA1c 5.4  9/7/2023  VPA *   *   FT4 0.82  1/14/2024    Na 138  5/23/2024  CLZ *   *  WBC 7.16  1/14/2024    Cr 0.8  5/23/2024  ANC 4.5; 63.3;   1/14/2024   Hgb 14.8  1/14/2024     BUN 20  5/23/2024  Trop I *   *  HCT 42.6  1/14/2024     GFR >60.0  5/23/2024   CPK *   *    1/14/2024     Alb 4.2  5/23/2024   PRL *   *  B12 *   *     T Bili 0.9  5/23/2024  Chol 134  1/14/2024  B9 *   *    ALP 56  5/23/2024  TGs 144  1/14/2024  B1 *   *    AST 17  5/23/2024  HDL 76 (H)  1/14/2024  Vit D *   *     ALT 12  5/23/2024  LDL 29.2 (L)  1/14/2024  HIV Non-reactive  1/14/2024     INR 1.0  1/14/2024  Flor *   *   Hep C Non-reactive  1/14/2024    GGT *   *  Lip *   *  RPR *   *    MCV 95  1/14/2024   NH4 *   *  UPT *   *      PETH 18  5/23/2024  THC Negative  5/27/2024    ETOH 256 (H)  1/14/2024  DIONI Negative  5/27/2024    EtG Negative  5/24/2024  AMP Negative  5/27/2024    ALC <10  5/27/2024  OPI Negative  5/27/2024    BZO Negative  5/27/2024  MTD Negative  5/27/2024     BAR Negative  5/27/2024  BUP *   *    PCP Negative  5/27/2024  FEN *    *

## 2024-05-29 NOTE — PATIENT CARE CONFERENCE
Diagnoses:  Alcohol use disorder, severe  LINDSAY     Progress:  Patient was staffed by team. Pt has been appropriate and cooperative in groups. Pt has offered feedback and support to other group members. Pt has not yet acquired a sponsor. Pt AA groups regularly. Pt has not completed his family meeting. No anticipated discharge date at this time. Team will continue to monitor pt's progress in the program.       Plan of Care:  Pt will continue ORP     Aftercare/Follow ups  Med Management: TBD  Psychotherapy: TBD     Staff present:  MD Sanjana Gao MD Bradley Landwehr, MD Frank Riess, [facility]  Shelby Chacon, [facility] [place] [facility]  Shelby Frye, SW  Jennifer Black, JD [facility]  LATESHA Lui

## 2024-05-29 NOTE — PROGRESS NOTES
Group Psychotherapy (PhD/LCSW)     Site: [facility]     Clinical status of patient: Intensive Outpatient Program (IOP)     Date: 05/29/2024      Group Focus: Mindfulness     Length of service: 94250 - 45-50 minutes     Number of patients in attendance: 13     Referred by: [facility]      Target symptoms: Alcohol Abuse     Patient's response to treatment: Active Listening     Progress toward goals: Progressing adequately     Interval History: Session focus was Mindfulness:  Mindfulness of Current Thoughts skills. Patients where introduced to the Mindfulness of current thought skills of observe your thoughts, adopt a curious mind, remember: you are not your thoughts, and dont block or suppress your thoughts. Patients identified the value of each skill, how to use each skill, and practiced the use of each skill in session.       Diagnosis:     ICD-10-CM ICD-9-CM   1. Alcohol use disorder, moderate, dependence  F10.20 303.90   2. LINDSAY (generalized anxiety disorder)  F41.1 300.02         Plan: Continue treatment on ORP

## 2024-05-30 ENCOUNTER — HOSPITAL ENCOUNTER (OUTPATIENT)
Dept: PSYCHIATRY | Facility: HOSPITAL | Age: 79
Discharge: HOME | End: 2024-05-30
Attending: PSYCHIATRY & NEUROLOGY
Payer: MEDICARE

## 2024-05-30 DIAGNOSIS — F10.20 ALCOHOL USE DISORDER, MODERATE, DEPENDENCE: Primary | ICD-10-CM

## 2024-05-30 PROCEDURE — 90853 GROUP PSYCHOTHERAPY: CPT | Performed by: SOCIAL WORKER

## 2024-05-30 PROCEDURE — 90853 GROUP PSYCHOTHERAPY: CPT

## 2024-05-30 NOTE — PLAN OF CARE
"   05/30/24 2656   Activity/Group Therapy Checklist   Group Other (Comments)  (Creative Therapy)   Attendance Attended   Follows Direction Followed directions   Group Interactions/Observations Interacted appropriately;Alert;Sharing;Supportive   Affect/Mood Range Normal range   Affect/Mood Display Appropriate   Goal Progression Progressing      facilitated self care creative session and discussed with patients how creative therapy can be therapeutic to resolving anxiety and other stress-related issues. SW discussed activity: " Affirmartion Collage". Patient's were able to draw and/ or color and paste pictures together in collage form to describe, non-verbally what their current flow of life looks like.   "

## 2024-05-30 NOTE — PLAN OF CARE
05/30/24 1416   Activity/Group Therapy Checklist   Group Other (Comments)  (Processing)   Attendance Attended   Follows Direction Followed directions   Group Interactions/Observations Interacted appropriately;Sharing;Supportive;Alert   Affect/Mood Range Normal range   Affect/Mood Display Appropriate   Goal Progression Progressing

## 2024-05-30 NOTE — PROGRESS NOTES
Group Psychotherapy (PhD/LCSW)     Site: [facility]     Clinical status of patient: Intensive Outpatient Program (IOP)     Date: 05/30/2024      Group Focus: Emotion Regulation      Length of service: 60308 - 45-60 minutes     Number of patients in attendance: 13     Referred by: [facility]      Target symptoms: Substance Abuse     Patient's response to treatment: Active Listening      Progress toward goals: Progressing adequately     Interval History: Session focus was Emotion Regulation:  Opposite Action.  Patients identified action urges for each emotion and learned how to engage in opposite action when the emotions are not justified or unhelpful.    Diagnosis:     ICD-10-CM ICD-9-CM   1. Alcohol use disorder, moderate, dependence  F10.20 303.90         Plan: Continue treatment on ORP

## 2024-05-30 NOTE — PLAN OF CARE
05/29/24 1300   Activity/Group Therapy Checklist   Group Goals/Reflection   Attendance Attended   Follows Direction Followed directions   Group Interactions/Observations Interacted appropriately   Affect/Mood Range Normal range   Affect/Mood Display Appropriate   Goal Progression Progressing       
   05/29/24 1403   Activity/Group Therapy Checklist   Group Other (Comments)  (Life Story)   Attendance Attended   Follows Direction Followed directions   Group Interactions/Observations Interacted appropriately;Alert;Sharing;Supportive   Affect/Mood Range Normal range   Affect/Mood Display Appropriate   Goal Progression Progressing     Patient was present during group member's Life Story.    
23

## 2024-05-31 ENCOUNTER — HOSPITAL ENCOUNTER (OUTPATIENT)
Dept: PSYCHIATRY | Facility: HOSPITAL | Age: 79
Discharge: HOME | End: 2024-05-31
Attending: PSYCHIATRY & NEUROLOGY
Payer: MEDICARE

## 2024-05-31 VITALS
SYSTOLIC BLOOD PRESSURE: 106 MMHG | HEART RATE: 78 BPM | TEMPERATURE: 97 F | DIASTOLIC BLOOD PRESSURE: 57 MMHG | RESPIRATION RATE: 18 BRPM

## 2024-05-31 DIAGNOSIS — Z79.899 LONG-TERM USE OF HIGH-RISK MEDICATION: ICD-10-CM

## 2024-05-31 DIAGNOSIS — F41.1 GAD (GENERALIZED ANXIETY DISORDER): ICD-10-CM

## 2024-05-31 DIAGNOSIS — F10.20 ALCOHOL USE DISORDER, MODERATE, DEPENDENCE: Primary | ICD-10-CM

## 2024-05-31 DIAGNOSIS — Z79.899 LONG-TERM USE OF HIGH-RISK MEDICATION: Primary | ICD-10-CM

## 2024-05-31 LAB
AMPHET+METHAMPHET UR QL: NEGATIVE
BARBITURATES UR QL SCN>200 NG/ML: NEGATIVE
BENZODIAZ UR QL SCN>200 NG/ML: NEGATIVE
BZE UR QL SCN: NEGATIVE
CANNABINOIDS UR QL SCN: NEGATIVE
CREAT UR-MCNC: 75 MG/DL (ref 23–375)
ETHANOL UR-MCNC: <10 MG/DL
ETHYL GLUCURONIDE: NEGATIVE NG/ML
METHADONE UR QL SCN>300 NG/ML: NEGATIVE
OPIATES UR QL SCN: NEGATIVE
PCP UR QL SCN>25 NG/ML: NEGATIVE
TOXICOLOGY INFORMATION: NORMAL

## 2024-05-31 PROCEDURE — 80307 DRUG TEST PRSMV CHEM ANLYZR: CPT | Performed by: PSYCHIATRY & NEUROLOGY

## 2024-05-31 PROCEDURE — 80321 ALCOHOLS BIOMARKERS 1OR 2: CPT | Performed by: PSYCHIATRY & NEUROLOGY

## 2024-05-31 PROCEDURE — 90853 GROUP PSYCHOTHERAPY: CPT

## 2024-05-31 PROCEDURE — 90853 GROUP PSYCHOTHERAPY: CPT | Performed by: SOCIAL WORKER

## 2024-05-31 PROCEDURE — 99232 SBSQ HOSP IP/OBS MODERATE 35: CPT | Mod: ,,, | Performed by: PSYCHIATRY & NEUROLOGY

## 2024-05-31 NOTE — PLAN OF CARE
"   05/31/24 1323   Activity/Group Therapy Checklist   Group Meditation/Relaxation   Attendance Attended   Follows Direction Followed directions   Group Interactions/Observations Interacted appropriately;Alert;Sharing;Supportive   Affect/Mood Range Normal range   Affect/Mood Display Appropriate   Goal Progression Progressing     Discussed mindfulness as a practice, the definition of mindfulness, and various research evidence to strengthen benefits of mindfulness. Engaged in "thought bubble" meditation where members were invited to witness body sensations, breath, and thoughts coming and going without attaching onto them.  "

## 2024-05-31 NOTE — PROGRESS NOTES
"FOLLOW UP VISIT: [facility]  DATE OF ADMISSION: 5/7/24    ASSESSMENT AND PLAN:     DIAGNOSES & PROBLEMS:  Alcohol use disorder, severe  Generalized anxiety disorder     In Summary:  Pt is a 79yo male presenting to ORP today to continue treatment for alcohol use disorder.  Pt had presentation to ED in January 2024 which was found to be alcohol intoxication.  Since that event, pt went to inpatient detox/rehab at the insistence of his wife and daughter.  Pt reports relapse since discharge of a few drinks, but denies persistent or daily alcohol use.  Last drink was reportedly about a week prior to admission.  He feels that his current medications have been very helpful and denies medication side effects.  Pt is motivated to be in the program at this time due to the insistence of his wife and daughter.       Plan:  -Ctn Zoloft 100mg daily  -Ctn naltrexone 50mg daily        - continue ORP and program protocol; while in program will continue to monitor routine VS, breathalyzer and alcohol/drug screenings  - continue MAT as prescribed  - monitor random drug/alcohol screening  - routine monitoring of PETH levels to assess for maintenance of sobriety  - counseled on full abstinence from alcohol and substances of abuse (illicit and prescription)  - full engagement in 12 step (or equivalent) recovery program(s), including meeting attendance and acquisition/maintenance of sponsor     INTERVAL HISTORY AND ROS:     Week has been going well.  He's been to 3 meetings this week and plans another meeting this weekend.  He does not have a sponsor yet.  No cravings for alcohol.  Discussed relapse shortly after rehab in the past.  He feels that he relapsed due to "getting back in my old routine" and is unable to name any specific triggers for relapse.  Pt doesn't feel like he is at risk for relapse now or once the program is complete.  He feels that he won't relapse because he feels so much better physically and has good " family support.  He does feel that he will continue AA after the IOP is completed.                WITHDRAWAL SYMPTOMS: no  CRAVINGS: no    CURRENT PSYCHOTROPIC REGIMEN:  Zoloft 100mg daily  Naltrexone 50mg daily      PERTINENT PAST HISTORY AND CHART REVIEW:   Per Chart:  Pt with presentation January 2024 to ED for slurred speech.  Stroke workup was negative and patient was found to have an EtOH level of 256.  Pt had driven from [place] himself for the evaluation.  Per chart review, his wife said that they were on a cruise recently a couple of weeks ago and said that he had been hiding bottles of alcohol from her. She thought that he had stopped drinking.        Per Patient:  Pt says since the pandemic he has been drinking much more heavily.  Alcohol use has increased since then.  IN September he fell and broke his elbow when on the golf [place].  Pt was untruthful with his wife who was concerned about his alcohol use around November or December.  In January he had a drink that morning and was very concerned he was having a seizure or something bad.  He was evaluated in the ED and found to have alcohol intoxication.  Daughter and wife insisted he needed inpatient detox.  He went to a facility in [place], [place] for 4 weeks mid January to mid February.  When coming home from the program, he started drinking some again.  Last drink was 1 week ago.  He did a couple of shots at that time, but didn't keep drinking because he didn't find it pleasurable.  Pt is on naltrexone and finds it to be helpful.  Gabapentin was also started while in rehab.  Since cutting back and recently stopping drinking, he feels physically much better.  He also sleeps well since he stopped.  When drinking at his heaviest, states he was drinking 3-4 glasses of wine once daily every day.  Denies having withdrawal symptoms when stopping.  Denies cravings.  He is not currently in a 12 step program.  Pt is in the ORP at the insistence of his wife  and daughter, who feels he should have more support.        Pt taking Zoloft 100mg daily.  This was originally started by his PCP after fracture of his elbow.  Pt says that his daughter specifically asked for him to be on Zoloft and he isn't sure why it was started.  He feels that helps him sleep because his mind isn't thinking as much when he tries to sleep.  Denies ever feeling sad/depressed or anxiety. Denies any side effects to current medications.       EXAMINATION:     There were no vitals taken for this visit.    MENTAL STATUS EXAMINATION:  General Appearance & Behavior:  adequately groomed, appropriately dressed, in no apparent distress, under good behavioral control  Involuntary Movements and Motor Activity:  no abnormal involuntary movements noted, no psychomotor agitation or retardation  Speech & Language: conversational, spontaneous, speaks and understands English proficiently  Mood: good  Affect:  normal, euthymic, reactive, full-range, mood-congruent, appropriate to situation and context  Thought Process & Associations: linear and goal-directed, with no loosening of associations  Thought Content & Perceptions:  no suicidal or homicidal ideation, no evidence of psychosis  Sensorium and Cognition:  grossly intact, no significant deficits noted  Insight & Judgment:  intact, demonstrates awareness of illness and adequate/appropriate behavior given the circumstances      RISK MANAGEMENT:     I[]I Y  I[x]I N  I[]I U  I[]I A  Suicidal Ideation/Behavior:   I[]I Y  I[x]I N  I[]I U  I[]I A  Homicidal Ideation/Behavior:  I[]I Y  I[x]I N  I[]I U  I[]I A  Violence:  I[]I Y  I[x]I N  I[]I U  I[]I A  Self-Injurious Behavior:     The patient is deemed to be a reliable and factually accurate historian.    I[]I Y  I[x]I N  I[]I U  I[]I A  I[]I N/A  Minimization of Risk Parameters Suspected/Evident:   I[]I Y  I[x]I N  I[]I U  I[]I A  I[]I N/A  Exaggeration of Risk Parameters Suspected/Evident:       [] Y  [x] N   Danger to Self:   [] Y  [x] N  Danger to Others:   [] Y  [x] N  Grave Disability:     The patient was evaluated for psychiatric admission and found not to meet the criteria at this time.  The patient can be safely and effectively managed in a less restrictive level of care.    In cases of emergency, daily coverage provided by Acute/ER Psych MD, NP, PA, or SW, with contact numbers located in [facility] On Call Schedule.    Sanjana Murphy MD, PhD  [facility]  -4  05/31/2024      KEY:     I[]I Y = Yes / Present / Endorses  I[]I N = No / Absent / Denies  I[]I U = Unknown / Unable to Assess / Unwilling to Participate  I[]I A = Ambiguity Exists / Accuracy Uncertain  I[]I D = Denial or Minimization is Suspected/Evident  I[]I N/A = Non-Applicable    CHART REVIEW:     Available documentation has been reviewed, and pertinent elements of the chart - including previous psychiatric evaluations - have been incorporated into this evaluation where appropriate.    ADVICE AND COUNSELING:     [x] In cases of emergencies (e.g. SI/HI resulting in danger to self or others, functioning deteriorates to the level of grave disability), call 911 or 988, or present to the emergency department for immediate assistance.  [x] Individuals should not operate a motor vehicle or heavy machinery if effects of medications or underlying symptoms/condition impair the ability to safely do so.    Alcohol, Tobacco, and Drug Counseling, as well as resources, has been provided, as warranted.     Shared medical decision making and informed consent are the hallmark and bedrock of good clinical care, and as such have been employed and obtained, respectively, to the degree possible.      Risk Mitigation Strategies, Harm Reduction Techniques, and Safety Netting are important interventions that can reduce acute and chronic risk, and as such have been employed to the degree possible.    Prescription Drug Management entails the  review, recommendation, or consideration without recommendation of medications, and as such was employed during the encounter.    Additional Psychoeducation has been provided, as warranted.    Discussed, to the extent possible, diagnosis, risks and benefits of proposed treatment vs alternative treatments vs no treatment, potential side effects of these treatments and the inherent unpredictability of treatment. The patient expresses understanding of the above and displays the capacity to agree with this treatment given said understanding. Patient also agrees that, currently, the benefits outweigh the risks and consents to treatment at this time.     Written material has been provided to supplement, augment, and reinforce any discussions and interventions, via the AVS or other pre-printed handouts, as warranted.      DIAGNOSTIC TESTING:     The chart was reviewed for recent diagnostic procedures and investigations, and pertinent results are noted below.     Glu 98  5/23/2024  Li *   *  TSH 0.698  1/14/2024    HgA1c 5.4  9/7/2023  VPA *   *   FT4 0.82  1/14/2024    Na 138  5/23/2024  CLZ *   *  WBC 7.16  1/14/2024    Cr 0.8  5/23/2024  ANC 4.5; 63.3;   1/14/2024   Hgb 14.8  1/14/2024     BUN 20  5/23/2024  Trop I *   *  HCT 42.6  1/14/2024     GFR >60.0  5/23/2024   CPK *   *    1/14/2024     Alb 4.2  5/23/2024   PRL *   *  B12 *   *     T Bili 0.9  5/23/2024  Chol 134  1/14/2024  B9 *   *    ALP 56  5/23/2024  TGs 144  1/14/2024  B1 *   *    AST 17  5/23/2024  HDL 76 (H)  1/14/2024  Vit D *   *     ALT 12  5/23/2024  LDL 29.2 (L)  1/14/2024  HIV Non-reactive  1/14/2024     INR 1.0  1/14/2024  Flor *   *   Hep C Non-reactive  1/14/2024    GGT *   *  Lip *   *  RPR *   *    MCV 95  1/14/2024   NH4 *   *  UPT *   *      PETH 18  5/23/2024  THC Negative  5/29/2024    ETOH 256 (H)  1/14/2024  DIONI Negative  5/29/2024    EtG Negative  5/29/2024  AMP Negative  5/29/2024     ALC <10  5/29/2024  OPI Negative  5/29/2024    BZO Negative  5/29/2024  MTD Negative  5/29/2024     BAR Negative  5/29/2024  BUP *   *    PCP Negative  5/29/2024  FEN *   *

## 2024-06-03 ENCOUNTER — HOSPITAL ENCOUNTER (OUTPATIENT)
Dept: PSYCHIATRY | Facility: HOSPITAL | Age: 79
Discharge: HOME | End: 2024-06-03
Attending: PSYCHIATRY & NEUROLOGY
Payer: MEDICARE

## 2024-06-03 VITALS
HEART RATE: 65 BPM | DIASTOLIC BLOOD PRESSURE: 62 MMHG | SYSTOLIC BLOOD PRESSURE: 139 MMHG | RESPIRATION RATE: 18 BRPM | TEMPERATURE: 98 F

## 2024-06-03 DIAGNOSIS — Z79.899 LONG-TERM USE OF HIGH-RISK MEDICATION: ICD-10-CM

## 2024-06-03 DIAGNOSIS — F41.1 GAD (GENERALIZED ANXIETY DISORDER): ICD-10-CM

## 2024-06-03 DIAGNOSIS — F32.A DEPRESSION, UNSPECIFIED DEPRESSION TYPE: ICD-10-CM

## 2024-06-03 DIAGNOSIS — F10.20 ALCOHOL USE DISORDER, MODERATE, DEPENDENCE: Primary | ICD-10-CM

## 2024-06-03 DIAGNOSIS — F55.4: ICD-10-CM

## 2024-06-03 DIAGNOSIS — F10.20 ALCOHOL USE DISORDER, MODERATE, DEPENDENCE: ICD-10-CM

## 2024-06-03 DIAGNOSIS — Z79.899 LONG-TERM USE OF HIGH-RISK MEDICATION: Primary | ICD-10-CM

## 2024-06-03 DIAGNOSIS — F55.8 ABUSE OF OTHER NON-PSYCHOACTIVE SUBSTANCES: ICD-10-CM

## 2024-06-03 LAB
AMPHET+METHAMPHET UR QL: NEGATIVE
BARBITURATES UR QL SCN>200 NG/ML: NEGATIVE
BENZODIAZ UR QL SCN>200 NG/ML: NEGATIVE
BZE UR QL SCN: NEGATIVE
CANNABINOIDS UR QL SCN: NEGATIVE
CREAT UR-MCNC: 48 MG/DL (ref 23–375)
ETHANOL UR-MCNC: <10 MG/DL
ETHYL GLUCURONIDE: ABNORMAL NG/ML
METHADONE UR QL SCN>300 NG/ML: NEGATIVE
OPIATES UR QL SCN: NEGATIVE
PCP UR QL SCN>25 NG/ML: NEGATIVE
TOXICOLOGY INFORMATION: NORMAL

## 2024-06-03 PROCEDURE — 80307 DRUG TEST PRSMV CHEM ANLYZR: CPT | Performed by: PSYCHIATRY & NEUROLOGY

## 2024-06-03 PROCEDURE — 90853 GROUP PSYCHOTHERAPY: CPT | Performed by: SOCIAL WORKER

## 2024-06-03 PROCEDURE — 99232 SBSQ HOSP IP/OBS MODERATE 35: CPT | Mod: ,,, | Performed by: PSYCHIATRY & NEUROLOGY

## 2024-06-03 NOTE — PLAN OF CARE
05/30/24 1400   Activity/Group Therapy Checklist   Group Addiction Education   Attendance Attended   Follows Direction Followed directions   Group Interactions/Observations Interacted appropriately   Affect/Mood Range Normal range   Affect/Mood Display Appropriate   Goal Progression Progressing

## 2024-06-03 NOTE — PROGRESS NOTES
FOLLOW UP VISIT: ADDICTION PSYCHIATRY CONSULTATION SERVICE      ASSESSMENT AND PLAN:     DIAGNOSES & PROBLEMS:  Alcohol use disorder, severe  Generalized anxiety disorder     In Summary:  Pt is a 79yo male presenting to ORP today to continue treatment for alcohol use disorder. Pt had presentation to ED in January 2024 which was found to be alcohol intoxication. Since that event, pt went to inpatient detox/rehab at the insistence of his wife and daughter. Pt reports relapse since discharge of a few drinks, but denies persistent or daily alcohol use. Last drink was reportedly about a week prior to admission. He feels that his current medications have been very helpful and denies medication side effects. Pt is motivated to be in the program at this time due to the insistence of his wife and daughter.     Plan:  -Ctn Zoloft 100mg daily  -Ctn naltrexone 50mg daily        - continue ORP and program protocol; while in program will continue to monitor routine VS, breathalyzer and alcohol/drug screenings  - continue MAT as prescribed  - monitor random drug/alcohol screening  - routine monitoring of PETH levels to assess for maintenance of sobriety  - counseled on full abstinence from alcohol and substances of abuse (illicit and prescription)  - full engagement in 12 step (or equivalent) recovery program(s), including meeting attendance and acquisition/maintenance of sponsor      INTERVAL HISTORY AND ROS:     Mr Paul says that he being doing fairly well over the weekend. Mentions that he found an AA group in [place]. Says he's still exploring options for possible sponsors. Denies any side effects from medications. Still taking zolofy and naltrexone 50 mg. Mentions his wife and granddaughter went to [facility] for the weekend. Weekly largely unremarkable, had dinner at daughters house last night. Denies any triggers, or cravings    CURRENT PSYCHOTROPIC REGIMEN:  Zoloft 100mg daily  Naltrexone 50mg  daily      PERTINENT PAST HISTORY AND CHART REVIEW:            Attestation signed by Jethro Kiser MD at 5/31/2024 10:17 AM     STAFF NOTE     The chart was reviewed and the case was discussed, including the assessment and management plan.  I have personally interviewed the patient, and agree with the overall findings, with corrections or clarifications, if any, noted herein.     *Maintaining sobriety in program, continue ORP - benefiting from and appropriate for [facility] level care.*     Jethro Kiser MD  Board Certification: Psychiatry and Addiction Medicine              FOLLOW UP VISIT: [facility]  DATE OF ADMISSION: 5/7/24     ASSESSMENT AND PLAN:      DIAGNOSES & PROBLEMS:  Alcohol use disorder, severe  Generalized anxiety disorder      In Summary:  Pt is a 79yo male presenting to [facility] today to continue treatment for alcohol use disorder.  Pt had presentation to ED in January 2024 which was found to be alcohol intoxication.  Since that event, pt went to inpatient detox/rehab at the insistence of his wife and daughter.  Pt reports relapse since discharge of a few drinks, but denies persistent or daily alcohol use.  Last drink was reportedly about a week prior to admission.  He feels that his current medications have been very helpful and denies medication side effects.  Pt is motivated to be in the program at this time due to the insistence of his wife and daughter.       Plan:  -Ctn Zoloft 100mg daily  -Ctn naltrexone 50mg daily        - continue ORP and program protocol; while in program will continue to monitor routine VS, breathalyzer and alcohol/drug screenings  - continue MAT as prescribed  - monitor random drug/alcohol screening  - routine monitoring of PETH levels to assess for maintenance of sobriety  - counseled on full abstinence from alcohol and substances of abuse (illicit and prescription)  - full engagement in 12 step (or equivalent) recovery program(s), including meeting  "attendance and acquisition/maintenance of sponsor     INTERVAL HISTORY AND ROS:      Week has been going well.  He's been to 3 meetings this week and plans another meeting this weekend.  He does not have a sponsor yet.  No cravings for alcohol.  Discussed relapse shortly after rehab in the past.  He feels that he relapsed due to "getting back in my old routine" and is unable to name any specific triggers for relapse.  Pt doesn't feel like he is at risk for relapse now or once the program is complete.  He feels that he won't relapse because he feels so much better physically and has good family support.  He does feel that he will continue AA after the IOP is completed.                 WITHDRAWAL SYMPTOMS: no  CRAVINGS: no     CURRENT PSYCHOTROPIC REGIMEN:  Zoloft 100mg daily  Naltrexone 50mg daily        PERTINENT PAST HISTORY AND CHART REVIEW:   Per Chart:  Pt with presentation January 2024 to ED for slurred speech.  Stroke workup was negative and patient was found to have an EtOH level of 256.  Pt had driven from [place] himself for the evaluation.  Per chart review, his wife said that they were on a cruise recently a couple of weeks ago and said that he had been hiding bottles of alcohol from her. She thought that he had stopped drinking.        Per Patient:  Pt says since the pandemic he has been drinking much more heavily.  Alcohol use has increased since then.  IN September he fell and broke his elbow when on the golf [place].  Pt was untruthful with his wife who was concerned about his alcohol use around November or December.  In January he had a drink that morning and was very concerned he was having a seizure or something bad.  He was evaluated in the ED and found to have alcohol intoxication.  Daughter and wife insisted he needed inpatient detox.  He went to a facility in [place], [place] for 4 weeks mid January to mid February.  When coming home from the program, he started drinking some again.  Last " "drink was 1 week ago.  He did a couple of shots at that time, but didn't keep drinking because he didn't find it pleasurable.  Pt is on naltrexone and finds it to be helpful.  Gabapentin was also started while in rehab.  Since cutting back and recently stopping drinking, he feels physically much better.  He also sleeps well since he stopped.  When drinking at his heaviest, states he was drinking 3-4 glasses of wine once daily every day.  Denies having withdrawal symptoms when stopping.  Denies cravings.  He is not currently in a 12 step program.  Pt is in the ORP at the insistence of his wife and daughter, who feels he should have more support.        Pt taking Zoloft 100mg daily.  This was originally started by his PCP after fracture of his elbow.  Pt says that his daughter specifically asked for him to be on Zoloft and he isn't sure why it was started.  He feels that helps him sleep because his mind isn't thinking as much when he tries to sleep.  Denies ever feeling sad/depressed or anxiety. Denies any side effects to current medications.               EXAMINATION:     There were no vitals taken for this visit.    MENTAL STATUS EXAMINATION:  General Appearance & Behavior: **   adequately groomed, appropriately dressed, in no apparent distress, under good behavioral control  Involuntary Movements and Motor Activity: **   no abnormal involuntary movements noted, no psychomotor agitation or retardation  Speech & Language:  conversational, spontaneous, speaks and understands English proficiently  Mood: "Doing okay"  Affect: **   reactive, mood congruent, normal, euthymic, reactive, full-range, mood-congruent, appropriate to situation and context  Thought Process & Associations: **   linear and goal-directed, with no loosening of associations  Thought Content & Perceptions: **   no suicidal or homicidal ideation, no evidence of psychosis  Sensorium and Cognition: **   grossly intact, no significant deficits " noted  Insight & Judgment: **   intact, demonstrates awareness of illness and adequate/appropriate behavior given the circumstances      RISK MANAGEMENT:     I[]I Y  I[x]I N  I[]I U  I[]I A  Suicidal Ideation/Behavior:   I[]I Y  I[x]I N  I[]I U  I[]I A  Homicidal Ideation/Behavior:   I[]I Y  I[x]I N  I[]I U  I[]I A  Violence: **  I[]I Y  I[x]I N  I[]I U  I[]I A  Self-Injurious Behavior: **    The patient is deemed to be a reliable and factually accurate historian.    I[]I Y  I[x]I N  I[]I U  I[]I A  I[]I N/A  Minimization of Risk Parameters Suspected/Evident:   I[]I Y  I[x]I N  I[]I U  I[]I A  I[]I N/A  Exaggeration of Risk Parameters Suspected/Evident:    [] Y  [x] N  Danger to Self:   [] Y  [x] N  Danger to Others:   [] Y  [x] N  Grave Disability:       In cases of emergency, daily coverage provided by Acute/ER Psych MD, NP, PA, or SW, with contact numbers located in [facility] On Call Schedule.    Camilo Scott  Department of Psychiatry  [facility]      KEY:     I[]I Y = Yes / Present / Endorses  I[]I N = No / Absent / Denies  I[]I U = Unknown / Unable to Assess / Unwilling to Participate  I[]I A = Ambiguity Exists / Accuracy Uncertain  I[]I D = Denial or Minimization is Suspected/Evident  I[]I N/A = Non-Applicable    CHART REVIEW:     Available documentation has been reviewed, and pertinent elements of the chart - including previous psychiatric evaluations - have been incorporated into this evaluation where appropriate.    ADVICE AND COUNSELING:     [x] In cases of emergencies (e.g. SI/HI resulting in danger to self or others, functioning deteriorates to the level of grave disability), call 911 or 988, or present to the emergency department for immediate assistance.  [x] Individuals should not operate a motor vehicle or heavy machinery if effects of medications or underlying symptoms/condition impair the ability to safely do so.    Alcohol, Tobacco, and Drug Counseling, as well  as resources, has been provided, as warranted.     Shared medical decision making and informed consent are the hallmark and bedrock of good clinical care, and as such have been employed and obtained, respectively, to the degree possible.      Risk Mitigation Strategies, Harm Reduction Techniques, and Safety Netting are important interventions that can reduce acute and chronic risk, and as such have been employed to the degree possible.    Prescription Drug Management entails the review, recommendation, or consideration without recommendation of medications, and as such was employed during the encounter.    Additional Psychoeducation has been provided, as warranted.    Discussed, to the extent possible, diagnosis, risks and benefits of proposed treatment vs alternative treatments vs no treatment, potential side effects of these treatments and the inherent unpredictability of treatment. The patient expresses understanding of the above and displays the capacity to agree with this treatment given said understanding. Patient also agrees that, currently, the benefits outweigh the risks and consents to treatment at this time.     Written material has been provided to supplement, augment, and reinforce any discussions and interventions, via the AVS or other pre-printed handouts, as warranted.      DIAGNOSTIC TESTING:     The chart was reviewed for recent diagnostic procedures and investigations, and pertinent results are noted below.     Glu 98  5/23/2024  Li *   *  TSH 0.698  1/14/2024    HgA1c 5.4  9/7/2023  VPA *   *   FT4 0.82  1/14/2024    Na 138  5/23/2024  CLZ *   *  WBC 7.16  1/14/2024    Cr 0.8  5/23/2024  ANC 4.5; 63.3;   1/14/2024   Hgb 14.8  1/14/2024     BUN 20  5/23/2024  Trop I *   *  HCT 42.6  1/14/2024     GFR >60.0  5/23/2024   CPK *   *    1/14/2024     Alb 4.2  5/23/2024   PRL *   *  B12 *   *     T Bili 0.9  5/23/2024  Chol 134  1/14/2024  B9 *   *    ALP 56  5/23/2024   TGs 144  1/14/2024  B1 *   *    AST 17  5/23/2024  HDL 76 (H)  1/14/2024  Vit D *   *     ALT 12  5/23/2024  LDL 29.2 (L)  1/14/2024  HIV Non-reactive  1/14/2024     INR 1.0  1/14/2024  Flor *   *   Hep C Non-reactive  1/14/2024    GGT *   *  Lip *   *  RPR *   *    MCV 95  1/14/2024   NH4 *   *  UPT *   *      PETH 18  5/23/2024  THC Negative  5/31/2024    ETOH 256 (H)  1/14/2024  DIONI Negative  5/31/2024    EtG Presumptive Positive (A)  5/31/2024  AMP Negative  5/31/2024    ALC <10  5/31/2024  OPI Negative  5/31/2024    BZO Negative  5/31/2024  MTD Negative  5/31/2024     BAR Negative  5/31/2024  BUP *   *    PCP Negative  5/31/2024  FEN *   *

## 2024-06-03 NOTE — PROGRESS NOTES
Group Psychotherapy (PhD/LCSW)     Site: [facility]     Clinical status of patient: Intensive Outpatient Program (IOP)     Date: 06/03/2024      Group Focus: Sleep 101     Length of service: 17351 - 45-60 minutes     Number of patients in attendance: 13     Referred by: [facility]      Target symptoms: Substance Abuse     Patient's response to treatment: Active Listening      Progress toward goals: Progressing adequately     Interval History: Session focus was on psychoeducation on basic concepts about sleep including stages of sleep, reasons for sleep, and changes in sleep as we age. Session included discussion of sleep drive, the uses of sleep medication, and the efficacy of CBTi. Clinician introduced group to daily sleep diary.     Diagnosis:     ICD-10-CM ICD-9-CM   1. Alcohol use disorder, moderate, dependence  F10.20 303.90   2. LINDSAY (generalized anxiety disorder)  F41.1 300.02         Plan: Continue treatment on ORP

## 2024-06-03 NOTE — PROGRESS NOTES
Group Psychotherapy (PhD/LCSW)     Site: [facility]     Clinical status of patient: Intensive Outpatient Program (IOP)     Date: 06/03/2024      Group Focus: Distress Tolerance      Length of service: 85206 - 45-60 minutes     Number of patients in attendance: 13     Referred by: [facility]      Target symptoms: Substance Abuse     Patient's response to treatment: Active Listening      Progress toward goals: Progressing adequately     Interval History: Session focus was Distress Tolerance:  Distract with ACCEPTS.  Patients were encouraged to use activities, contributing, comparisons, different emotions, pushing away, other thoughts, and sensations to reduce intensity of distress.       Diagnosis:     ICD-10-CM ICD-9-CM   1. Alcohol use disorder, moderate, dependence  F10.20 303.90   2. LINDSAY (generalized anxiety disorder)  F41.1 300.02       Plan: Continue treatment on ORP

## 2024-06-03 NOTE — PLAN OF CARE
06/03/24 1300   Activity/Group Therapy Checklist   Group Goals/Reflection   Attendance Attended   Follows Direction Followed directions   Group Interactions/Observations Interacted appropriately   Affect/Mood Range Normal range   Affect/Mood Display Appropriate        Khloe Bearden is a 57 year old female presenting as a new patient to Dr Andrews for re-establishing care, annual gyn exam.    Last pap smear 01/10/2018  Last mammogram 06/21/2021  Birth control postmenopausal    Denies known Latex allergy or symptoms of Latex sensitivity.  Medication verified, no changes    Recent Review Flowsheet Data     Date 8/15/2022    Adult PHQ 2 Score 0    Adult PHQ 2 Interpretation No further screening needed    Little interest or pleasure in activity? Not at all    Feeling down, depressed or hopeless? Not at all          No Chaperone desired.

## 2024-06-03 NOTE — PLAN OF CARE
05/31/24 1300   Activity/Group Therapy Checklist   Group Goals/Reflection   Attendance Attended   Follows Direction Followed directions   Group Interactions/Observations Interacted appropriately   Affect/Mood Range Normal range   Affect/Mood Display Appropriate   Goal Progression Progressing

## 2024-06-04 ENCOUNTER — OFFICE VISIT (OUTPATIENT)
Dept: UROLOGY | Facility: CLINIC | Age: 79
End: 2024-06-04
Payer: MEDICARE

## 2024-06-04 ENCOUNTER — HOSPITAL ENCOUNTER (OUTPATIENT)
Dept: PSYCHIATRY | Facility: HOSPITAL | Age: 79
Discharge: HOME | End: 2024-06-04
Attending: PSYCHIATRY & NEUROLOGY
Payer: MEDICARE

## 2024-06-04 VITALS
BODY MASS INDEX: 25.2 KG/M2 | DIASTOLIC BLOOD PRESSURE: 75 MMHG | HEART RATE: 86 BPM | SYSTOLIC BLOOD PRESSURE: 120 MMHG | WEIGHT: 186.06 LBS | HEIGHT: 72 IN

## 2024-06-04 DIAGNOSIS — F10.20 ALCOHOL USE DISORDER, SEVERE, DEPENDENCE: Primary | Chronic | ICD-10-CM

## 2024-06-04 DIAGNOSIS — F41.1 GAD (GENERALIZED ANXIETY DISORDER): ICD-10-CM

## 2024-06-04 DIAGNOSIS — C67.8 MALIGNANT NEOPLASM OF OVERLAPPING SITES OF BLADDER: Primary | ICD-10-CM

## 2024-06-04 PROCEDURE — 99499 UNLISTED E&M SERVICE: CPT | Mod: S$PBB,,, | Performed by: NURSE PRACTITIONER

## 2024-06-04 PROCEDURE — 51720 TREATMENT OF BLADDER LESION: CPT | Mod: PBBFAC | Performed by: NURSE PRACTITIONER

## 2024-06-04 PROCEDURE — 99232 SBSQ HOSP IP/OBS MODERATE 35: CPT | Mod: ,,, | Performed by: PSYCHIATRY & NEUROLOGY

## 2024-06-04 PROCEDURE — 51720 TREATMENT OF BLADDER LESION: CPT | Mod: S$PBB,,, | Performed by: NURSE PRACTITIONER

## 2024-06-04 PROCEDURE — 99999PBSHW POCT URINE DIPSTICK WITHOUT MICROSCOPE: Mod: PBBFAC,,,

## 2024-06-04 PROCEDURE — 90833 PSYTX W PT W E/M 30 MIN: CPT | Mod: ,,, | Performed by: PSYCHIATRY & NEUROLOGY

## 2024-06-04 PROCEDURE — 90853 GROUP PSYCHOTHERAPY: CPT | Performed by: SOCIAL WORKER

## 2024-06-04 PROCEDURE — 99215 OFFICE O/P EST HI 40 MIN: CPT | Mod: PBBFAC,25 | Performed by: NURSE PRACTITIONER

## 2024-06-04 PROCEDURE — 99999PBSHW PR PBB SHADOW TECHNICAL ONLY FILED TO HB: Mod: PBBFAC,,,

## 2024-06-04 PROCEDURE — 99999 PR PBB SHADOW E&M-EST. PATIENT-LVL V: CPT | Mod: PBBFAC,,, | Performed by: NURSE PRACTITIONER

## 2024-06-04 PROCEDURE — 81002 URINALYSIS NONAUTO W/O SCOPE: CPT | Mod: PBBFAC | Performed by: NURSE PRACTITIONER

## 2024-06-04 PROCEDURE — 90853 GROUP PSYCHOTHERAPY: CPT | Mod: ,,, | Performed by: PSYCHOLOGIST

## 2024-06-04 PROCEDURE — 90853 GROUP PSYCHOTHERAPY: CPT

## 2024-06-04 RX ORDER — FINASTERIDE 5 MG/1
5 TABLET, FILM COATED ORAL
COMMUNITY

## 2024-06-04 RX ADMIN — GEMCITABINE HYDROCHLORIDE 2000 MG: 1 INJECTION, POWDER, LYOPHILIZED, FOR SOLUTION INTRAVENOUS at 02:06

## 2024-06-04 NOTE — PLAN OF CARE
06/04/24 1100   Activity/Group Therapy Checklist   Group Educational   Attendance Attended   Follows Direction Followed directions   Group Interactions/Observations Interacted appropriately   Affect/Mood Range Normal range   Affect/Mood Display Appropriate

## 2024-06-04 NOTE — PROGRESS NOTES
CHIEF COMPLAINT:    Monroe Paul is a 78 y.o. male presents today for Bladder Cancer.     HISTORY OF PRESENTING ILLINESS:    Monroe Paul is a 78 y.o. male who is an established patient in our clinic.   He is newly diagnosed with NMI Bladder Cancer.   He had original TURBT in [place] and then went on to have repeat resection at [facility].   He was seen in clinic 07/17/2023 with Dr. Luong     09/21/2023 he completed Induction GEMCITABINE x 6 doses.   11/02/2023 (-) cysto with Dr. Luong.      11/20/2023 he began Maintenance Gemcitabine; monthly x 12 doses. .      Here today for Maintenance Gemcitabine; dose 8 of 12.      Reports he was able to hold it the 2 hours last time.   Did not take his Na Bicarb last month, but did take today.   Last night only got up once to urinate.   No dysuria/hematuria.        Urologic History:      6/9/2023 -- TURBT @ [place] -- HG Ta  6/29/2023 -- CT Urogram -- no nodes, no renal masses, no ureteral filling defects  6/30/2023 -- TURBT @ [facility] -- no tumor  08/08/2023-09/21/2023 -- Completed Induction Gemcitabine.   11/02/2023 (-) cysto with Dr. Luong.   11/20/2023 he began Maintenance Gemcitabine x 12 months  04/25/2024 CTU  05/02/2024 normal cysto              REVIEW OF SYSTEMS:  Review of Systems   Constitutional: Negative.  Negative for chills and fever.   Eyes:  Negative for double vision.   Respiratory:  Negative for cough and shortness of breath.    Cardiovascular:  Negative for chest pain and palpitations.   Gastrointestinal:  Negative for abdominal pain, constipation, diarrhea, nausea and vomiting.   Genitourinary:  Negative for dysuria and hematuria.        See HPI   Musculoskeletal:  Negative for falls.   Neurological:  Negative for dizziness and seizures.   Endo/Heme/Allergies:  Negative for polydipsia.         PATIENT HISTORY:    Past Medical History:   Diagnosis Date    Kidney stone        Past Surgical History:   Procedure Laterality Date    BLADDER SURGERY       HERNIA REPAIR         No family history on file.    Social History     Socioeconomic History    Marital status:    Tobacco Use    Smoking status: Never    Smokeless tobacco: Never   Substance and Sexual Activity    Alcohol use: Yes    Drug use: Never    Sexual activity: Not Currently       Allergies:  Patient has no known allergies.    Medications:    Current Outpatient Medications:     celecoxib (CELEBREX) 200 MG capsule, Take 200 mg by mouth every morning., Disp: , Rfl:     cholecalciferol, vitamin D3, (VITAMIN D3) 25 mcg (1,000 unit) capsule, Take 5,000 Units by mouth., Disp: , Rfl:     cyanocobalamin 500 MCG tablet, Take 1 tablet by mouth every morning., Disp: , Rfl:     denosumab (PROLIA) 60 mg/mL Syrg, Inject 60 mg into the skin., Disp: , Rfl:     eszopiclone (LUNESTA) 3 mg Tab, Take 3 mg by mouth., Disp: , Rfl:     evolocumab (REPATHA SURECLICK) 140 mg/mL PnIj, Inject 140 mg into the skin., Disp: , Rfl:     ezetimibe (ZETIA) 10 mg tablet, Take 10 mg by mouth., Disp: , Rfl:     gabapentin (NEURONTIN) 300 MG capsule, Take by mouth., Disp: , Rfl:     hydroCHLOROthiazide (HYDRODIURIL) 50 MG tablet, Take 50 mg by mouth., Disp: , Rfl:     hyoscyamine (LEVSIN) 0.125 mg TbDL, Take by mouth., Disp: , Rfl:     hyoscyamine (LEVSIN) 0.125 mg TbDL, Take 0.125 mg by mouth., Disp: , Rfl:     imiquimod (ALDARA) 5 % cream, Apply 1 packet topically., Disp: , Rfl:     linaCLOtide (LINZESS) 290 mcg Cap capsule, Take 290 mcg by mouth., Disp: , Rfl:     LINZESS 290 mcg Cap capsule, Take 290 mcg by mouth., Disp: , Rfl:     LINZESS 72 mcg Cap capsule, Take 72 mcg by mouth every morning., Disp: , Rfl:     metoprolol succinate (TOPROL-XL) 25 MG 24 hr tablet, Take 25 mg by mouth., Disp: , Rfl:     metoprolol tartrate (LOPRESSOR) 50 MG tablet, Take as directed the night before and 1 hour prior to CT., Disp: , Rfl:     metroNIDAZOLE (METROGEL) 0.75 % gel, Apply 1 application  topically., Disp: , Rfl:     omega-3 fatty  acids/fish oil (FISH OIL-OMEGA-3 FATTY ACIDS) 300-1,000 mg capsule, Take 2 capsules by mouth every evening., Disp: , Rfl:     oxyCODONE (ROXICODONE) 5 MG immediate release tablet, Take by mouth., Disp: , Rfl:     PERCOCET 5-325 mg per tablet, Take 1 tablet by mouth every 6 (six) hours as needed., Disp: , Rfl:     potassium chloride (KLOR-CON) 10 MEQ TbSR, Take 10 mEq by mouth., Disp: , Rfl:     rosuvastatin (CRESTOR) 40 MG Tab, Take 40 mg by mouth every evening., Disp: , Rfl:     sertraline (ZOLOFT) 50 MG tablet, Take 1 tablet by mouth every morning., Disp: , Rfl:     sodium bicarbonate 650 MG tablet, Take 2 tablets (1,300 mg total) by mouth as needed (start before bladder installation to reduce irritation). 2 tabs night before bladder installation then 2 tabs the morning of the bladder installation, Disp: 24 tablet, Rfl: 1    sulfamethoxazole-trimethoprim 800-160mg (BACTRIM DS) 800-160 mg Tab, Take 1 tablet by mouth 2 (two) times daily., Disp: , Rfl:     tamsulosin (FLOMAX) 0.4 mg Cap, Take 0.4 mg by mouth., Disp: , Rfl:     thiamine 100 MG tablet, Take 100 mg by mouth every morning., Disp: , Rfl:     thiamine mononitrate, vit B1, (VITAMIN B-1, MONONITRATE,) 100 mg Tab, Take 1 tablet by mouth every morning., Disp: , Rfl:     TURMERIC ORAL, Take 1 capsule by mouth once daily., Disp: , Rfl:     valsartan (DIOVAN) 40 MG tablet, Take 40 mg by mouth., Disp: , Rfl:     ZINC ORAL, Take 1 tablet by mouth once daily., Disp: , Rfl:     Current Facility-Administered Medications:     gemcitabine (GEMZAR) 2,000 mg in sodium chloride 0.9% SolP 100 mL bladder instillation, 2,000 mg, Intravesical, 1 time in Clinic/HOD, Junior Luong MD    PHYSICAL EXAMINATION:  Physical Exam  Vitals and nursing note reviewed.   Constitutional:       General: He is awake.      Appearance: Normal appearance.   HENT:      Head: Normocephalic.      Right Ear: External ear normal.      Left Ear: External ear normal.      Nose: Nose normal.  "  Cardiovascular:      Rate and Rhythm: Normal rate.   Pulmonary:      Effort: Pulmonary effort is normal. No respiratory distress.   Abdominal:      Tenderness: There is no abdominal tenderness. There is no right CVA tenderness or left CVA tenderness.   Genitourinary:     Penis: Normal.       Testes: Normal.   Musculoskeletal:         General: Normal range of motion.      Cervical back: Normal range of motion.   Skin:     General: Skin is warm and dry.   Neurological:      General: No focal deficit present.      Mental Status: He is alert and oriented to person, place, and time.   Psychiatric:         Mood and Affect: Mood normal.         Behavior: Behavior is cooperative.         LABS:      In office UA today was clear of active infection and blood.       No results found for: "PSA", "PSADIAG", "PSATOTAL", "PHIND"    Lab Results   Component Value Date    CREATININE 0.8 05/23/2024    EGFRNORACEVR >60.0 05/23/2024               IMPRESSION:    Encounter Diagnoses   Name Primary?    Malignant neoplasm of overlapping sites of bladder Yes       Maintenance Gemcitabine; dose 8 of 12      Assessment:       1. Malignant neoplasm of overlapping sites of bladder        Plan:          I spent 40 minutes with the patient of which more than half was spent in direct consultation with the patient in regards to our treatment and plan.  We addressed the expectations for today's plan of care.  Reviewed the preparation:   Na Bicarb 1300mg night before then this morning.   No coffee or caffeine this morning;    We discussed their Bladder Cancer.  Discussed previous treatments and the expectations, benefits, risks with this new treatment.  Reviewed how this medication works. Possible side effects.   Discussed importance of post clean up for 6 hours after the 2 hour urination;    Diet modifications; no caffeine the morning of installation; increase water intake at the 2 hour void to flush out.  Sit to urinate; flush twice; no bleach " needed  Clean urethra after initial urination.   No sex for 48 hours after installation; use of condom during the 6 week installations.  Narayan was placed and bladder drained.   I instilled the Gemcitabine 2000mg/100ml then removed the narayan.   I cleaned urethra and sounding tissue.  Again reviewed post installation instructions  Recommended lifestyle modifications with proper, healthy diet, good hydration if no fluid restrictions; reducing bladder irritants.    RTC in 4 weeks for Dose 9 of 12

## 2024-06-04 NOTE — PROGRESS NOTES
Group Psychotherapy (PhD/LCSW)     Site: [facility]     Clinical status of patient: Intensive Outpatient Program (IOP)     Date: 06/04/2024      Group Focus: Interpersonal Effectiveness     Length of service: 60927 - 45-60 minutes     Number of patients in attendance: 12     Referred by: [facility]      Target symptoms: Substance Abuse     Patient's response to treatment: Active Listening      Progress toward goals: Progressing adequately     Interval History: Session focus was Interpersonal Effectiveness:  GIVE and FAST.  Patients were introduced to skills to promote active listening, self-respect, and attendance to values. Patients were provided with opportunities to collaborate with others and role-play in session.     Diagnosis:     ICD-10-CM ICD-9-CM   1. Alcohol use disorder, moderate, dependence  F10.20 303.90   2. LINDSAY (generalized anxiety disorder)  F41.1 300.02       Plan: Continue treatment on ORP

## 2024-06-04 NOTE — PROGRESS NOTES
Collateral information:   Lisa Henderson (daughter) [phone]     Today I discussed patient recent relapse. Mrs Henderson says that patient has adequate family support.  Details that she does not believe that her mother should be involved because it will be an addition stressor to both parties. She believes that having patient in program for longer duration is a good option. Says he needs to be seen for months, if possible. . [place] that she will not share what has been detailed in this call today with her mother. However, she states if Mr Mays relapses again she will tell her mother. Today we discussed pharmacological interventions such as Antabuse as additional treatment. Mrs. Henderson says that she would like patient to be started on Antabuse as long it does not interact with cancer medication. Says that she still believes there is possibly underlying depression that is contributing to presentation. Admits that patient mood is better with increased dose of antidepressant.  Agreeable to future calls and updates.

## 2024-06-04 NOTE — PLAN OF CARE
"   06/04/24 1501   Activity/Group Therapy Checklist   Group Other (Comments)  (Processing Group)   Attendance Attended   Follows Direction Followed directions   Group Interactions/Observations Interacted appropriately;Alert;Sharing;Supportive   Affect/Mood Range Normal range   Affect/Mood Display Appropriate   Goal Progression Progressing      facilitated group session. SW discussed Healthy vs. Unhealthy Coping Strategies  activity. Patients identified a "current problem" , identified two unhealthy coping strategies and the consequences of those strategies. Pts alsoidentified three healthy coping strategkes , their expected outcomes and barriers to using healthy coping strategies.   "

## 2024-06-05 ENCOUNTER — HOSPITAL ENCOUNTER (OUTPATIENT)
Dept: PSYCHIATRY | Facility: HOSPITAL | Age: 79
Discharge: HOME | End: 2024-06-05
Attending: PSYCHIATRY & NEUROLOGY
Payer: MEDICARE

## 2024-06-05 VITALS
RESPIRATION RATE: 18 BRPM | HEART RATE: 73 BPM | SYSTOLIC BLOOD PRESSURE: 125 MMHG | DIASTOLIC BLOOD PRESSURE: 60 MMHG | TEMPERATURE: 98 F

## 2024-06-05 DIAGNOSIS — Z79.899 LONG-TERM USE OF HIGH-RISK MEDICATION: Primary | ICD-10-CM

## 2024-06-05 DIAGNOSIS — F41.1 GAD (GENERALIZED ANXIETY DISORDER): ICD-10-CM

## 2024-06-05 DIAGNOSIS — F10.20 ALCOHOL USE DISORDER, SEVERE, DEPENDENCE: Primary | Chronic | ICD-10-CM

## 2024-06-05 DIAGNOSIS — Z79.899 LONG-TERM USE OF HIGH-RISK MEDICATION: ICD-10-CM

## 2024-06-05 LAB
AMPHET+METHAMPHET UR QL: NEGATIVE
BARBITURATES UR QL SCN>200 NG/ML: NEGATIVE
BENZODIAZ UR QL SCN>200 NG/ML: NEGATIVE
BZE UR QL SCN: NEGATIVE
CANNABINOIDS UR QL SCN: NEGATIVE
CREAT UR-MCNC: 39 MG/DL (ref 23–375)
ETHANOL UR-MCNC: <10 MG/DL
ETHYL GLUC/SULFATE INTERPRETATION: NORMAL
ETHYL GLUCURONIDE UR CFM-MCNC: 674 NG/ML
ETHYL GLUCURONIDE: NEGATIVE NG/ML
ETHYL SULFATE UR CFM-MCNC: 202 NG/ML
METHADONE UR QL SCN>300 NG/ML: NEGATIVE
OPIATES UR QL SCN: NEGATIVE
PCP UR QL SCN>25 NG/ML: NEGATIVE
TOXICOLOGY INFORMATION: NORMAL

## 2024-06-05 PROCEDURE — 90853 GROUP PSYCHOTHERAPY: CPT | Performed by: SOCIAL WORKER

## 2024-06-05 PROCEDURE — 90853 GROUP PSYCHOTHERAPY: CPT | Mod: ,,, | Performed by: PSYCHOLOGIST

## 2024-06-05 PROCEDURE — 99232 SBSQ HOSP IP/OBS MODERATE 35: CPT | Mod: ,,, | Performed by: PSYCHIATRY & NEUROLOGY

## 2024-06-05 PROCEDURE — 80307 DRUG TEST PRSMV CHEM ANLYZR: CPT | Performed by: PSYCHIATRY & NEUROLOGY

## 2024-06-05 PROCEDURE — 90853 GROUP PSYCHOTHERAPY: CPT | Mod: ,,,

## 2024-06-05 NOTE — PROGRESS NOTES
Collateral information:   Lisa Henderson (daughter) [phone]     Today I discussed patient recent relapse. Mrs Henderson says that patient has adequate family support.  Details that she does not believe that her mother should be involved because it will be an addition stressor to both parties. She believes that having patient in program for longer duration is a good option. Says he needs to be seen for months, if possible. . [place] that she will not share what has been detailed in this call today with her mother. However, she states if Mr Mays relapses again she will tell her mother. Today we discussed pharmacological interventions such as Antabuse as additional treatment. Mrs. Henderson says that she would like patient to be started on Antabuse as long it does not interact with cancer medication. Says that she still believes there is possibly underlying depression that is contributing to presentation. Admits that patient mood is better with increased dose of antidepressant.  Agreeable to future calls and updates.

## 2024-06-05 NOTE — PROGRESS NOTES
FOLLOW UP VISIT: [facility]  DATE OF ADMISSION: 5/7/24    ASSESSMENT AND PLAN:     DIAGNOSES & PROBLEMS:  Alcohol use disorder, severe  Generalized anxiety disorder     In Summary:  Pt is a 77yo male presenting to ORP today to continue treatment for alcohol use disorder.  Pt had presentation to ED in January 2024 which was found to be alcohol intoxication.  Since that event, pt went to inpatient detox/rehab at the insistence of his wife and daughter.  Pt reports relapse since discharge of a few drinks, but denies persistent or daily alcohol use.  Last drink was reportedly about a week prior to admission.  He feels that his current medications have been very helpful and denies medication side effects.  Pt is motivated to be in the program at this time due to the insistence of his wife and daughter.       Plan:  -Ctn Zoloft 100mg daily  -Ctn naltrexone 50mg daily        - continue ORP and program protocol; while in program will continue to monitor routine VS, breathalyzer and alcohol/drug screenings  - continue MAT as prescribed  - monitor random drug/alcohol screening  - routine monitoring of PETH levels to assess for maintenance of sobriety  - counseled on full abstinence from alcohol and substances of abuse (illicit and prescription)  - full engagement in 12 step (or equivalent) recovery program(s), including meeting attendance and acquisition/maintenance of sponsor     INTERVAL HISTORY AND ROS:   Mr Paul says he is doing well today. Discussed recent call with his daughter Lisa. Expresses concern about recent relapse, explored options today. Discussed triggers, such as being in office. Patient agreeable to extending duration of stay here. Agreeable to coming Mon, wed, fri starting next week X 2weeks. Discussed pharmacologic interventions for alcohol use such as Antabuse for adversion therapy.Patient says that he does not want to take medication that will make him drowsy. Counseled patient on possible  effects on liver and potential for drowsiness. Denies any recent relapse or cravings.               WITHDRAWAL SYMPTOMS: no  CRAVINGS: no    CURRENT PSYCHOTROPIC REGIMEN:  Zoloft 100mg daily  Naltrexone 50mg daily      PERTINENT PAST HISTORY AND CHART REVIEW:   Per Chart:  Pt with presentation January 2024 to ED for slurred speech.  Stroke workup was negative and patient was found to have an EtOH level of 256.  Pt had driven from [place] [employer] himself for the evaluation.  Per chart review, his wife said that they were on a cruise recently a couple of weeks ago and said that he had been hiding bottles of alcohol from her. She thought that he had stopped drinking.        Per Patient:  Pt says since the pandemic he has been drinking much more heavily.  Alcohol use has increased since then.  IN September he fell and broke his elbow when on the golf [place].  Pt was untruthful with his wife who was concerned about his alcohol use around November or December.  In January he had a drink that morning and was very concerned he was having a seizure or something bad.  He was evaluated in the ED and found to have alcohol intoxication.  Daughter and wife insisted he needed inpatient detox.  He went to a facility in [place], [place] for 4 weeks mid January to mid February.  When coming home from the program, he started drinking some again.  Last drink was 1 week ago.  He did a couple of shots at that time, but didn't keep drinking because he didn't find it pleasurable.  Pt is on naltrexone and finds it to be helpful.  Gabapentin was also started while in rehab.  Since cutting back and recently stopping drinking, he feels physically much better.  He also sleeps well since he stopped.  When drinking at his heaviest, states he was drinking 3-4 glasses of wine once daily every day.  Denies having withdrawal symptoms when stopping.  Denies cravings.  He is not currently in a 12 step program.  Pt is in the ORP at the insistence of his wife  and daughter, who feels he should have more support.        Pt taking Zoloft 100mg daily.  This was originally started by his PCP after fracture of his elbow.  Pt says that his daughter specifically asked for him to be on Zoloft and he isn't sure why it was started.  He feels that helps him sleep because his mind isn't thinking as much when he tries to sleep.  Denies ever feeling sad/depressed or anxiety. Denies any side effects to current medications.       EXAMINATION:     There were no vitals taken for this visit.    MENTAL STATUS EXAMINATION:  General Appearance & Behavior:  adequately groomed, appropriately dressed, in no apparent distress, under good behavioral control  Involuntary Movements and Motor Activity:  no abnormal involuntary movements noted, no psychomotor agitation or retardation  Speech & Language: conversational, spontaneous, speaks and understands English proficiently  Mood: good  Affect:  normal, euthymic, reactive, full-range, mood-congruent, appropriate to situation and context  Thought Process & Associations: linear and goal-directed, with no loosening of associations  Thought Content & Perceptions:  no suicidal or homicidal ideation, no evidence of psychosis  Sensorium and Cognition:  grossly intact, no significant deficits noted  Insight & Judgment:  intact, demonstrates awareness of illness and adequate/appropriate behavior given the circumstances      RISK MANAGEMENT:     I[]I Y  I[x]I N  I[]I U  I[]I A  Suicidal Ideation/Behavior:   I[]I Y  I[x]I N  I[]I U  I[]I A  Homicidal Ideation/Behavior:  I[]I Y  I[x]I N  I[]I U  I[]I A  Violence:  I[]I Y  I[x]I N  I[]I U  I[]I A  Self-Injurious Behavior:     The patient is deemed to be a reliable and factually accurate historian.    I[]I Y  I[x]I N  I[]I U  I[]I A  I[]I N/A  Minimization of Risk Parameters Suspected/Evident:   I[]I Y  I[x]I N  I[]I U  I[]I A  I[]I N/A  Exaggeration of Risk Parameters Suspected/Evident:       [] Y  [x] N   Danger to Self:   [] Y  [x] N  Danger to Others:   [] Y  [x] N  Grave Disability:     The patient was evaluated for psychiatric admission and found not to meet the criteria at this time.  The patient can be safely and effectively managed in a less restrictive level of care.    In cases of emergency, daily coverage provided by Acute/ER Psych MD, NP, PA, or SW, with contact numbers located in [facility] On Call Schedule.    Camilo Scott M.D.   [facility], PGY-IV  [facility]    06/05/2024      KEY:     I[]I Y = Yes / Present / Endorses  I[]I N = No / Absent / Denies  I[]I U = Unknown / Unable to Assess / Unwilling to Participate  I[]I A = Ambiguity Exists / Accuracy Uncertain  I[]I D = Denial or Minimization is Suspected/Evident  I[]I N/A = Non-Applicable    CHART REVIEW:     Available documentation has been reviewed, and pertinent elements of the chart - including previous psychiatric evaluations - have been incorporated into this evaluation where appropriate.    ADVICE AND COUNSELING:     [x] In cases of emergencies (e.g. SI/HI resulting in danger to self or others, functioning deteriorates to the level of grave disability), call 911 or 988, or present to the emergency department for immediate assistance.  [x] Individuals should not operate a motor vehicle or heavy machinery if effects of medications or underlying symptoms/condition impair the ability to safely do so.    Alcohol, Tobacco, and Drug Counseling, as well as resources, has been provided, as warranted.     Shared medical decision making and informed consent are the hallmark and bedrock of good clinical care, and as such have been employed and obtained, respectively, to the degree possible.      Risk Mitigation Strategies, Harm Reduction Techniques, and Safety Netting are important interventions that can reduce acute and chronic risk, and as such have been employed to the degree  possible.    Prescription Drug Management entails the review, recommendation, or consideration without recommendation of medications, and as such was employed during the encounter.    Additional Psychoeducation has been provided, as warranted.    Discussed, to the extent possible, diagnosis, risks and benefits of proposed treatment vs alternative treatments vs no treatment, potential side effects of these treatments and the inherent unpredictability of treatment. The patient expresses understanding of the above and displays the capacity to agree with this treatment given said understanding. Patient also agrees that, currently, the benefits outweigh the risks and consents to treatment at this time.     Written material has been provided to supplement, augment, and reinforce any discussions and interventions, via the AVS or other pre-printed handouts, as warranted.      DIAGNOSTIC TESTING:     The chart was reviewed for recent diagnostic procedures and investigations, and pertinent results are noted below.     Glu 98  5/23/2024  Li *   *  TSH 0.698  1/14/2024    HgA1c 5.4  9/7/2023  VPA *   *   FT4 0.82  1/14/2024    Na 138  5/23/2024  CLZ *   *  WBC 7.16  1/14/2024    Cr 0.8  5/23/2024  ANC 4.5; 63.3;   1/14/2024   Hgb 14.8  1/14/2024     BUN 20  5/23/2024  Trop I *   *  HCT 42.6  1/14/2024     GFR >60.0  5/23/2024   CPK *   *    1/14/2024     Alb 4.2  5/23/2024   PRL *   *  B12 *   *     T Bili 0.9  5/23/2024  Chol 134  1/14/2024  B9 *   *    ALP 56  5/23/2024  TGs 144  1/14/2024  B1 *   *    AST 17  5/23/2024  HDL 76 (H)  1/14/2024  Vit D *   *     ALT 12  5/23/2024  LDL 29.2 (L)  1/14/2024  HIV Non-reactive  1/14/2024     INR 1.0  1/14/2024  Flor *   *   Hep C Non-reactive  1/14/2024    GGT *   *  Lip *   *  RPR *   *    MCV 95  1/14/2024   NH4 *   *  UPT *   *      PETH 18  5/23/2024  THC Negative  6/3/2024    ETOH 256 (H)  1/14/2024  DIONI Negative  6/3/2024     EtG Presumptive Positive (A)  5/31/2024  AMP Negative  6/3/2024    ALC <10  6/3/2024  OPI Negative  6/3/2024    BZO Negative  6/3/2024  MTD Negative  6/3/2024     BAR Negative  6/3/2024  BUP *   *    PCP Negative  6/3/2024  FEN *   *

## 2024-06-05 NOTE — PATIENT CARE CONFERENCE
Diagnoses:  Alcohol use disorder, severe  LINDSAY     Progress:  Patient was staffed by team. Pt has been appropriate and cooperative in groups. Pt has offered feedback and support to other group members. Pt has not yet acquired a sponsor. Pt AA groups regularly. Pt has not completed his family meeting. No anticipated discharge date at this time. Team will continue to monitor pt's progress in the program.       Plan of Care:  Pt will continue ORP     Aftercare/Follow ups  Med Management: TBD  Psychotherapy: TBD     Staff present:  MD Sanjana Gao MD Bradley Landwehr, MD Frank Riess, [facility]  Shelby Chacon, [facility]  Shelby Frye, SW  Jennifer Black, [facility]  LATESHA Lui

## 2024-06-05 NOTE — PROGRESS NOTES
Group Psychotherapy (PhD/LCSW)     Site: [facility]     Clinical status of patient: Intensive Outpatient Program (IOP)     Date: 06/05/2024      Group Focus: Mindfulness     Length of service: 99988 - 45-60 minutes     Number of patients in attendance: 11     Referred by: [facility]      Target symptoms: Substance Abuse     Patient's response to treatment: Active Listening      Progress toward goals: Progressing adequately     Interval History: Session focus was Mindfulness: Introduction to Mindfulness and States of Mind. Patients were introduced to the practice of mindfulness. Patient discussed the two types of mindfulness practice and the efficacy of mindfulness as a skill to be used in conjunction with other DBT skills. Patients practiced identifying uses of reasonable mind, emotion mind, and synthesizing both to achieve a state of Wise mind.      Diagnosis:     ICD-10-CM ICD-9-CM   1. Alcohol use disorder, severe, dependence  F10.20 303.90   2. LINDSAY (generalized anxiety disorder)  F41.1 300.02       Plan: Continue treatment on ORP

## 2024-06-05 NOTE — PROGRESS NOTES
FOLLOW UP VISIT: [facility]  DATE OF ADMISSION: 5/7/24    ASSESSMENT AND PLAN:     DIAGNOSES & PROBLEMS:  Alcohol use disorder, severe  Generalized anxiety disorder     In Summary:  Pt is a 79yo male presenting to ORP today to continue treatment for alcohol use disorder.  Pt had presentation to ED in January 2024 which was found to be alcohol intoxication.  Since that event, pt went to inpatient detox/rehab at the insistence of his wife and daughter.  Pt reports relapse since discharge of a few drinks, but denies persistent or daily alcohol use.  Last drink was reportedly about a week prior to admission.  He feels that his current medications have been very helpful and denies medication side effects.  Pt is motivated to be in the program at this time due to the insistence of his wife and daughter.       Plan:  -Ctn Zoloft 100mg daily  -Ctn naltrexone 50mg daily        - continue ORP and program protocol; while in program will continue to monitor routine VS, breathalyzer and alcohol/drug screenings  - continue MAT as prescribed  - monitor random drug/alcohol screening  - routine monitoring of PETH levels to assess for maintenance of sobriety  - counseled on full abstinence from alcohol and substances of abuse (illicit and prescription)  - full engagement in 12 step (or equivalent) recovery program(s), including meeting attendance and acquisition/maintenance of sponsor     INTERVAL HISTORY AND ROS:   Mr Paul says he is doing well today. Discussed finding of recent alcohol biomarker today. Expresses his emotions regarding his dishonesty to staff. Says that he would like to maintain sobriety, discussed ways to keep patient sober. Expressed to patient family support is a large contributor to his continued sobriety. Patient expresses his apprehension to having family, specifically his wife involvement in this process. He is agreeable to having his daughter involved and grants us permission today to  discussed recent events with her. Discussed possibly extending duration of stay in program. Patient is apprehensive but later says that he can commit to a few days at most. Denies any depressive symptoms, denies any SI, plan or intent today               WITHDRAWAL SYMPTOMS: no  CRAVINGS: no    CURRENT PSYCHOTROPIC REGIMEN:  Zoloft 100mg daily  Naltrexone 50mg daily      PERTINENT PAST HISTORY AND CHART REVIEW:   Per Chart:  Pt with presentation January 2024 to ED for slurred speech.  Stroke workup was negative and patient was found to have an EtOH level of 256.  Pt had driven from [place] himself for the evaluation.  Per chart review, his wife said that they were on a cruise recently a couple of weeks ago and said that he had been hiding bottles of alcohol from her. She thought that he had stopped drinking.        Per Patient:  Pt says since the pandemic he has been drinking much more heavily.  Alcohol use has increased since then.  IN September he fell and broke his elbow when [place] the golf [place].  Pt was untruthful with his wife who was concerned about his alcohol use around November or December.  In January he had a drink that morning and was very concerned he was having a seizure or something bad.  He was evaluated in the ED and found to have alcohol intoxication.  Daughter and wife insisted he needed inpatient detox.  He went to a facility in [place], [place] for 4 weeks mid January to mid February.  When coming home from the program, he started drinking some again.  Last drink was 1 week ago.  He did a couple of shots at that time, but didn't keep drinking because he didn't find it pleasurable.  Pt is on naltrexone and finds it to be helpful.  Gabapentin was also started while in rehab.  Since cutting back and recently stopping drinking, he feels physically much better.  He also sleeps well since he stopped.  When drinking at his heaviest, states he was drinking 3-4 glasses of wine once daily every day.   Denies having withdrawal symptoms when stopping.  Denies cravings.  He is not currently in a 12 step program.  Pt is in the ORP at the insistence of his wife and daughter, who feels he should have more support.        Pt taking Zoloft 100mg daily.  This was originally started by his PCP after fracture of his elbow.  Pt says that his daughter specifically asked for him to be on Zoloft and he isn't sure why it was started.  He feels that helps him sleep because his mind isn't thinking as much when he tries to sleep.  Denies ever feeling sad/depressed or anxiety. Denies any side effects to current medications.       EXAMINATION:     There were no vitals taken for this visit.    MENTAL STATUS EXAMINATION:  General Appearance & Behavior:  adequately groomed, appropriately dressed, in no apparent distress, under good behavioral control  Involuntary Movements and Motor Activity:  no abnormal involuntary movements noted, no psychomotor agitation or retardation  Speech & Language: conversational, spontaneous, speaks and understands English proficiently  Mood: good  Affect:  normal, euthymic, reactive, full-range, mood-congruent, appropriate to situation and context  Thought Process & Associations: linear and goal-directed, with no loosening of associations  Thought Content & Perceptions:  no suicidal or homicidal ideation, no evidence of psychosis  Sensorium and Cognition:  grossly intact, no significant deficits noted  Insight & Judgment:  intact, demonstrates awareness of illness and adequate/appropriate behavior given the circumstances      RISK MANAGEMENT:     I[]I Y  I[x]I N  I[]I U  I[]I A  Suicidal Ideation/Behavior:   I[]I Y  I[x]I N  I[]I U  I[]I A  Homicidal Ideation/Behavior:  I[]I Y  I[x]I N  I[]I U  I[]I A  Violence:  I[]I Y  I[x]I N  I[]I U  I[]I A  Self-Injurious Behavior:     The patient is deemed to be a reliable and factually accurate historian.    I[]I Y  I[x]I N  I[]I U  I[]I A  I[]I N/A  Minimization of  Risk Parameters Suspected/Evident:   I[]I Y  I[x]I N  I[]I U  I[]I A  I[]I N/A  Exaggeration of Risk Parameters Suspected/Evident:       [] Y  [x] N  Danger to Self:   [] Y  [x] N  Danger to Others:   [] Y  [x] N  Grave Disability:     The patient was evaluated for psychiatric admission and found not to meet the criteria at this time.  The patient can be safely and effectively managed in a less restrictive level of care.    In cases of emergency, daily coverage provided by Acute/ER Psych MD, NP, PA, or SW, with contact numbers located in [facility] On Call Schedule.    Camilo Scott M.D.   [facility], PGY-IV  [facility]    06/05/2024      KEY:     I[]I Y = Yes / Present / Endorses  I[]I N = No / Absent / Denies  I[]I U = Unknown / Unable to Assess / Unwilling to Participate  I[]I A = Ambiguity Exists / Accuracy Uncertain  I[]I D = Denial or Minimization is Suspected/Evident  I[]I N/A = Non-Applicable    CHART REVIEW:     Available documentation has been reviewed, and pertinent elements of the chart - including previous psychiatric evaluations - have been incorporated into this evaluation where appropriate.    ADVICE AND COUNSELING:     [x] In cases of emergencies (e.g. SI/HI resulting in danger to self or others, functioning deteriorates to the level of grave disability), call 911 or 988, or present to the emergency department for immediate assistance.  [x] Individuals should not operate a motor vehicle or heavy machinery if effects of medications or underlying symptoms/condition impair the ability to safely do so.    Alcohol, Tobacco, and Drug Counseling, as well as resources, has been provided, as warranted.     Shared medical decision making and informed consent are the hallmark and bedrock of good clinical care, and as such have been employed and obtained, respectively, to the degree possible.      Risk Mitigation Strategies, Harm Reduction Techniques,  and Safety Netting are important interventions that can reduce acute and chronic risk, and as such have been employed to the degree possible.    Prescription Drug Management entails the review, recommendation, or consideration without recommendation of medications, and as such was employed during the encounter.    Additional Psychoeducation has been provided, as warranted.    Discussed, to the extent possible, diagnosis, risks and benefits of proposed treatment vs alternative treatments vs no treatment, potential side effects of these treatments and the inherent unpredictability of treatment. The patient expresses understanding of the above and displays the capacity to agree with this treatment given said understanding. Patient also agrees that, currently, the benefits outweigh the risks and consents to treatment at this time.     Written material has been provided to supplement, augment, and reinforce any discussions and interventions, via the AVS or other pre-printed handouts, as warranted.      DIAGNOSTIC TESTING:     The chart was reviewed for recent diagnostic procedures and investigations, and pertinent results are noted below.     Glu 98  5/23/2024  Li *   *  TSH 0.698  1/14/2024    HgA1c 5.4  9/7/2023  VPA *   *   FT4 0.82  1/14/2024    Na 138  5/23/2024  CLZ *   *  WBC 7.16  1/14/2024    Cr 0.8  5/23/2024  ANC 4.5; 63.3;   1/14/2024   Hgb 14.8  1/14/2024     BUN 20  5/23/2024  Trop I *   *  HCT 42.6  1/14/2024     GFR >60.0  5/23/2024   CPK *   *    1/14/2024     Alb 4.2  5/23/2024   PRL *   *  B12 *   *     T Bili 0.9  5/23/2024  Chol 134  1/14/2024  B9 *   *    ALP 56  5/23/2024  TGs 144  1/14/2024  B1 *   *    AST 17  5/23/2024  HDL 76 (H)  1/14/2024  Vit D *   *     ALT 12  5/23/2024  LDL 29.2 (L)  1/14/2024  HIV Non-reactive  1/14/2024     INR 1.0  1/14/2024  Flor *   *   Hep C Non-reactive  1/14/2024    GGT *   *  Lip *   *  RPR *   *    MCV 95  1/14/2024    NH4 *   *  UPT *   *      PETH 18  5/23/2024  THC Negative  6/3/2024    ETOH 256 (H)  1/14/2024  DIONI Negative  6/3/2024    EtG Presumptive Positive (A)  5/31/2024  AMP Negative  6/3/2024    ALC <10  6/3/2024  OPI Negative  6/3/2024    BZO Negative  6/3/2024  MTD Negative  6/3/2024     BAR Negative  6/3/2024  BUP *   *    PCP Negative  6/3/2024  FEN *   *

## 2024-06-06 ENCOUNTER — HOSPITAL ENCOUNTER (OUTPATIENT)
Dept: PSYCHIATRY | Facility: HOSPITAL | Age: 79
Discharge: HOME | End: 2024-06-06
Attending: PSYCHIATRY & NEUROLOGY
Payer: MEDICARE

## 2024-06-06 DIAGNOSIS — F10.20 ALCOHOL USE DISORDER, SEVERE, DEPENDENCE: Primary | ICD-10-CM

## 2024-06-06 DIAGNOSIS — Z79.899 LONG-TERM USE OF HIGH-RISK MEDICATION: ICD-10-CM

## 2024-06-06 DIAGNOSIS — Z79.899 LONG-TERM USE OF HIGH-RISK MEDICATION: Primary | ICD-10-CM

## 2024-06-06 PROCEDURE — 90853 GROUP PSYCHOTHERAPY: CPT | Mod: ,,, | Performed by: PSYCHOLOGIST

## 2024-06-06 PROCEDURE — 90853 GROUP PSYCHOTHERAPY: CPT

## 2024-06-06 NOTE — PLAN OF CARE
"   06/06/24 141   Activity/Group Therapy Checklist   Group Other (Comments)  (Creative Therapy)   Attendance Attended   Follows Direction Followed directions   Group Interactions/Observations Interacted appropriately;Alert;Sharing;Supportive   Affect/Mood Range Normal range   Affect/Mood Display Appropriate   Goal Progression Progressing      facilitated creative group therapy session. SW discussed with patients , " Life in My Hands'. Patients were asked to trace outline of their hands and label left hand as " past" and right hand as " future". Patients were asked to use images and words on the left hand to process their past, reflect on their present self and recoggnize how far they have come in life. The patients were then asked to use images and words on the left hand identify hopes, dreasm and goals for their future self.     "

## 2024-06-06 NOTE — PROGRESS NOTES
Group Psychotherapy (PhD/LCSW)     Site: [facility]     Clinical status of patient: Intensive Outpatient Program (IOP)     Date: 06/06/2024      Group Focus: Emotion Regulation      Length of service: 34186 - 45-60 minutes     Number of patients in attendance: 10     Referred by: [facility]      Target symptoms: Substance Abuse     Patient's response to treatment: Active Listening      Progress toward goals: Progressing adequately     Interval History: Session focus was Emotion Regulation:  Problem-Solving.  Patients were encouraged to identify problems, check the facts, generate solutions, look at pros/cons, and create action steps to use the solution.    Diagnosis:     ICD-10-CM ICD-9-CM   1. Long-term use of high-risk medication  Z79.899 V58.69   2. Alcohol use disorder, severe, dependence  F10.20 303.90         Plan: Continue treatment on ORP

## 2024-06-07 ENCOUNTER — LAB VISIT (OUTPATIENT)
Dept: LAB | Facility: HOSPITAL | Age: 79
End: 2024-06-07
Attending: PSYCHIATRY & NEUROLOGY
Payer: MEDICARE

## 2024-06-07 ENCOUNTER — HOSPITAL ENCOUNTER (OUTPATIENT)
Dept: PSYCHIATRY | Facility: HOSPITAL | Age: 79
Discharge: HOME | End: 2024-06-07
Attending: PSYCHIATRY & NEUROLOGY
Payer: MEDICARE

## 2024-06-07 VITALS
SYSTOLIC BLOOD PRESSURE: 125 MMHG | DIASTOLIC BLOOD PRESSURE: 62 MMHG | TEMPERATURE: 96 F | HEART RATE: 72 BPM | RESPIRATION RATE: 18 BRPM

## 2024-06-07 DIAGNOSIS — F41.1 GAD (GENERALIZED ANXIETY DISORDER): ICD-10-CM

## 2024-06-07 DIAGNOSIS — Z79.899 LONG-TERM USE OF HIGH-RISK MEDICATION: Primary | ICD-10-CM

## 2024-06-07 DIAGNOSIS — Z79.899 LONG-TERM USE OF HIGH-RISK MEDICATION: ICD-10-CM

## 2024-06-07 DIAGNOSIS — C67.8 MALIGNANT NEOPLASM OF OVERLAPPING SITES OF BLADDER: ICD-10-CM

## 2024-06-07 DIAGNOSIS — F10.20 ALCOHOL USE DISORDER, SEVERE, DEPENDENCE: Primary | Chronic | ICD-10-CM

## 2024-06-07 LAB
ALBUMIN SERPL BCP-MCNC: 4 G/DL (ref 3.5–5.2)
ALP SERPL-CCNC: 60 U/L (ref 55–135)
ALT SERPL W/O P-5'-P-CCNC: 13 U/L (ref 10–44)
AMPHET+METHAMPHET UR QL: NEGATIVE
ANION GAP SERPL CALC-SCNC: 6 MMOL/L (ref 8–16)
AST SERPL-CCNC: 17 U/L (ref 10–40)
BARBITURATES UR QL SCN>200 NG/ML: NEGATIVE
BENZODIAZ UR QL SCN>200 NG/ML: NEGATIVE
BILIRUB SERPL-MCNC: 0.9 MG/DL (ref 0.1–1)
BUN SERPL-MCNC: 23 MG/DL (ref 8–23)
BZE UR QL SCN: NEGATIVE
CALCIUM SERPL-MCNC: 10.3 MG/DL (ref 8.7–10.5)
CANNABINOIDS UR QL SCN: NEGATIVE
CHLORIDE SERPL-SCNC: 107 MMOL/L (ref 95–110)
CO2 SERPL-SCNC: 29 MMOL/L (ref 23–29)
CREAT SERPL-MCNC: 0.9 MG/DL (ref 0.5–1.4)
CREAT UR-MCNC: 63 MG/DL (ref 23–375)
EST. GFR  (NO RACE VARIABLE): >60 ML/MIN/1.73 M^2
ETHANOL UR-MCNC: <10 MG/DL
ETHYL GLUCURONIDE: NEGATIVE NG/ML
GLUCOSE SERPL-MCNC: 71 MG/DL (ref 70–110)
METHADONE UR QL SCN>300 NG/ML: NEGATIVE
OPIATES UR QL SCN: NEGATIVE
PCP UR QL SCN>25 NG/ML: NEGATIVE
POTASSIUM SERPL-SCNC: 4.1 MMOL/L (ref 3.5–5.1)
PROT SERPL-MCNC: 7 G/DL (ref 6–8.4)
SODIUM SERPL-SCNC: 142 MMOL/L (ref 136–145)
TOXICOLOGY INFORMATION: NORMAL

## 2024-06-07 PROCEDURE — 36415 COLL VENOUS BLD VENIPUNCTURE: CPT | Performed by: PSYCHIATRY & NEUROLOGY

## 2024-06-07 PROCEDURE — 99232 SBSQ HOSP IP/OBS MODERATE 35: CPT | Mod: ,,, | Performed by: PSYCHIATRY & NEUROLOGY

## 2024-06-07 PROCEDURE — 90853 GROUP PSYCHOTHERAPY: CPT | Performed by: SOCIAL WORKER

## 2024-06-07 PROCEDURE — 80053 COMPREHEN METABOLIC PANEL: CPT | Performed by: PSYCHIATRY & NEUROLOGY

## 2024-06-07 PROCEDURE — 90853 GROUP PSYCHOTHERAPY: CPT

## 2024-06-07 PROCEDURE — 80321 ALCOHOLS BIOMARKERS 1OR 2: CPT | Performed by: PSYCHIATRY & NEUROLOGY

## 2024-06-07 PROCEDURE — 80307 DRUG TEST PRSMV CHEM ANLYZR: CPT | Performed by: PSYCHIATRY & NEUROLOGY

## 2024-06-07 RX ORDER — DISULFIRAM 250 MG/1
250 TABLET ORAL DAILY
Qty: 30 TABLET | Refills: 1 | Status: SHIPPED | OUTPATIENT
Start: 2024-06-07 | End: 2025-06-07

## 2024-06-07 NOTE — PROGRESS NOTES
Group Psychotherapy (PhD/LCSW)     Site: [facility]     Clinical status of patient: Intensive Outpatient Program (IOP)     Date: 06/06/2024      Group Focus: Principles of Recovery      Length of service: 45947 - 45-60 minutes     Number of patients in attendance: 4     Referred by: [facility]      Target symptoms: Substance Abuse     Patient's response to treatment: Active Listening      Progress toward goals: Progressing adequately     Interval History: Discussed the value of maintaining active social engagement with others in recovery for maintaining long term sobriety.       Diagnosis:       ICD-10-CM ICD-9-CM   1. Long-term use of high-risk medication  Z79.899 V58.69   2. Alcohol use disorder, severe, dependence  F10.20 303.90         Plan: Continue treatment on ORP

## 2024-06-07 NOTE — PLAN OF CARE
06/07/24 1412   Activity/Group Therapy Checklist   Group Meditation/Relaxation   Attendance Attended   Follows Direction Followed directions   Group Interactions/Observations Interacted appropriately;Sharing;Supportive;Alert   Affect/Mood Range Normal range   Affect/Mood Display Appropriate   Goal Progression Progressing

## 2024-06-07 NOTE — PLAN OF CARE
06/07/24 1300   Activity/Group Therapy Checklist   Group Goals/Reflection   Attendance Attended   Follows Direction Followed directions   Group Interactions/Observations Interacted appropriately   Affect/Mood Range Normal range   Affect/Mood Display Appropriate   Goal Progression Progressing

## 2024-06-07 NOTE — PLAN OF CARE
06/05/24 1300   Activity/Group Therapy Checklist   Group Goals/Reflection   Attendance Attended   Follows Direction Followed directions   Group Interactions/Observations Interacted appropriately   Affect/Mood Range Normal range   Affect/Mood Display Appropriate

## 2024-06-07 NOTE — PROGRESS NOTES
FOLLOW UP VISIT: [facility]  DATE OF ADMISSION: 5/7/24    ASSESSMENT AND PLAN:     DIAGNOSES & PROBLEMS:  Alcohol use disorder, severe  Generalized anxiety disorder     In Summary:  Pt is a 77yo male presenting to ORP today to continue treatment for alcohol use disorder.  Pt had presentation to ED in January 2024 which was found to be alcohol intoxication.  Since that event, pt went to inpatient detox/rehab at the insistence of his wife and daughter.  Pt reports relapse since discharge of a few drinks, but denies persistent or daily alcohol use.  Last drink was reportedly about a week prior to admission.  He feels that his current medications have been very helpful and denies medication side effects.  Pt is motivated to be in the program at this time due to the insistence of his wife and daughter.       Plan:  -Start Antabuse 250 mg QD  -Ctn Zoloft 100mg daily  -Ctn naltrexone 50mg daily        - continue ORP and program protocol; while in program will continue to monitor routine VS, breathalyzer and alcohol/drug screenings  - continue MAT as prescribed  - monitor random drug/alcohol screening  - routine monitoring of PETH levels to assess for maintenance of sobriety  - counseled on full abstinence from alcohol and substances of abuse (illicit and prescription)  - full engagement in 12 step (or equivalent) recovery program(s), including meeting attendance and acquisition/maintenance of sponsor     INTERVAL HISTORY AND ROS:   Mr Paul says he is doing well today. Discussed recent call with his daughter Lisa and possible family meeting next week. Patient says he is agreeable to that plan. Denies any changes to his mood or appetite. Denies any cravings, today we discussed way to manage ques and impulses. Says that he is going to have dinner with friends this weekend. Denies any SI, depression or anxiety symptoms today. Discussed starting Antabuse today, patient agreeable to plan. Disclosed information  regarding potential interaction with Antabuse such as alcohol containing products.              WITHDRAWAL SYMPTOMS: no  CRAVINGS: no    CURRENT PSYCHOTROPIC REGIMEN:  Zoloft 100mg daily  Naltrexone 50mg daily      PERTINENT PAST HISTORY AND CHART REVIEW:   Per Chart:  Pt with presentation January 2024 to ED for slurred speech.  Stroke workup was negative and patient was found to have an EtOH level of 256.  Pt had driven from [place] himself for the evaluation.  Per chart review, his wife said that they were on a cruise recently a couple of weeks ago and said that he had been hiding bottles of alcohol from her. She thought that he had stopped drinking.        Per Patient:  Pt says since the pandemic he has been drinking much more heavily.  Alcohol use has increased since then.  IN September he fell and broke his elbow when on the golf course.  Pt was untruthful with his wife who was concerned about his alcohol use around November or December.  In January he had a drink that morning and was very concerned he was having a seizure or something bad.  He was evaluated in the ED and found to have alcohol intoxication.  Daughter and wife insisted he needed inpatient detox.  He went to a facility in [place], [place] for 4 weeks mid January to mid February.  When coming home from the program, he started drinking some again.  Last drink was 1 week ago.  He did a couple of shots at that time, but didn't keep drinking because he didn't find it pleasurable.  Pt is on naltrexone and finds it to be helpful.  Gabapentin was also started while in rehab.  Since cutting back and recently stopping drinking, he feels physically much better.  He also sleeps well since he stopped.  When drinking at his heaviest, states he was drinking 3-4 glasses of wine once daily every day.  Denies having withdrawal symptoms when stopping.  Denies cravings.  He is not currently in a 12 step program.  Pt is in the ORP at the insistence of his wife and  daughter, who feels he should have more support.        Pt taking Zoloft 100mg daily.  This was originally started by his PCP after fracture of his elbow.  Pt says that his daughter specifically asked for him to be on Zoloft and he isn't sure why it was started.  He feels that helps him sleep because his mind isn't thinking as much when he tries to sleep.  Denies ever feeling sad/depressed or anxiety. Denies any side effects to current medications.       EXAMINATION:     /62   Pulse 72   Temp 96.4 °F (35.8 °C)   Resp 18     MENTAL STATUS EXAMINATION:  General Appearance & Behavior:  adequately groomed, appropriately dressed, in no apparent distress, under good behavioral control  Involuntary Movements and Motor Activity:  no abnormal involuntary movements noted, no psychomotor agitation or retardation  Speech & Language: conversational, spontaneous, speaks and understands English proficiently  Mood: good  Affect:  normal, euthymic, reactive, full-range, mood-congruent, appropriate to situation and context  Thought Process & Associations: linear and goal-directed, with no loosening of associations  Thought Content & Perceptions:  no suicidal or homicidal ideation, no evidence of psychosis  Sensorium and Cognition:  grossly intact, no significant deficits noted  Insight & Judgment:  intact, demonstrates awareness of illness and adequate/appropriate behavior given the circumstances      RISK MANAGEMENT:     I[]I Y  I[x]I N  I[]I U  I[]I A  Suicidal Ideation/Behavior:   I[]I Y  I[x]I N  I[]I U  I[]I A  Homicidal Ideation/Behavior:  I[]I Y  I[x]I N  I[]I U  I[]I A  Violence:  I[]I Y  I[x]I N  I[]I U  I[]I A  Self-Injurious Behavior:     The patient is deemed to be a reliable and factually accurate historian.    I[]I Y  I[x]I N  I[]I U  I[]I A  I[]I N/A  Minimization of Risk Parameters Suspected/Evident:   I[]I Y  I[x]I N  I[]I U  I[]I A  I[]I N/A  Exaggeration of Risk Parameters Suspected/Evident:       [] Y   [x] N  Danger to Self:   [] Y  [x] N  Danger to Others:   [] Y  [x] N  Grave Disability:     The patient was evaluated for psychiatric admission and found not to meet the criteria at this time.  The patient can be safely and effectively managed in a less restrictive level of care.    In cases of emergency, daily coverage provided by Acute/ER Psych MD, NP, PA, or SW, with contact numbers located in [facility] On Call Schedule.    Camilo Scott M.D.   \[facility]\""-[facility], PGY-IV  [facility]    06/07/2024      KEY:     I[]I Y = Yes / Present / Endorses  I[]I N = No / Absent / Denies  I[]I U = Unknown / Unable to Assess / Unwilling to Participate  I[]I A = Ambiguity Exists / Accuracy Uncertain  I[]I D = Denial or Minimization is Suspected/Evident  I[]I N/A = Non-Applicable    CHART REVIEW:     Available documentation has been reviewed, and pertinent elements of the chart - including previous psychiatric evaluations - have been incorporated into this evaluation where appropriate.    ADVICE AND COUNSELING:     [x] In cases of emergencies (e.g. SI/HI resulting in danger to self or others, functioning deteriorates to the level of grave disability), call 911 or 988, or present to the emergency department for immediate assistance.  [x] Individuals should not operate a motor vehicle or heavy machinery if effects of medications or underlying symptoms/condition impair the ability to safely do so.    Alcohol, Tobacco, and Drug Counseling, as well as resources, has been provided, as warranted.     Shared medical decision making and informed consent are the hallmark and bedrock of good clinical care, and as such have been employed and obtained, respectively, to the degree possible.      Risk Mitigation Strategies, Harm Reduction Techniques, and Safety Netting are important interventions that can reduce acute and chronic risk, and as such have been employed to the degree  possible.    Prescription Drug Management entails the review, recommendation, or consideration without recommendation of medications, and as such was employed during the encounter.    Additional Psychoeducation has been provided, as warranted.    Discussed, to the extent possible, diagnosis, risks and benefits of proposed treatment vs alternative treatments vs no treatment, potential side effects of these treatments and the inherent unpredictability of treatment. The patient expresses understanding of the above and displays the capacity to agree with this treatment given said understanding. Patient also agrees that, currently, the benefits outweigh the risks and consents to treatment at this time.     Written material has been provided to supplement, augment, and reinforce any discussions and interventions, via the AVS or other pre-printed handouts, as warranted.      DIAGNOSTIC TESTING:     The chart was reviewed for recent diagnostic procedures and investigations, and pertinent results are noted below.     Glu 71  6/7/2024  Li *   *  TSH 0.698  1/14/2024    HgA1c 5.4  9/7/2023  VPA *   *   FT4 0.82  1/14/2024    Na 142  6/7/2024  CLZ *   *  WBC 7.16  1/14/2024    Cr 0.9  6/7/2024  ANC 4.5; 63.3;   1/14/2024   Hgb 14.8  1/14/2024     BUN 23  6/7/2024  Trop I *   *  HCT 42.6  1/14/2024     GFR >60.0  6/7/2024   CPK *   *    1/14/2024     Alb 4.0  6/7/2024   PRL *   *  B12 *   *     T Bili 0.9  6/7/2024  Chol 134  1/14/2024  B9 *   *    ALP 60  6/7/2024  TGs 144  1/14/2024  B1 *   *    AST 17  6/7/2024  HDL 76 (H)  1/14/2024  Vit D *   *     ALT 13  6/7/2024  LDL 29.2 (L)  1/14/2024  HIV Non-reactive  1/14/2024     INR 1.0  1/14/2024  Flor *   *   Hep C Non-reactive  1/14/2024    GGT *   *  Lip *   *  RPR *   *    MCV 95  1/14/2024   NH4 *   *  UPT *   *      PETH 18  5/23/2024  THC Negative  6/5/2024    ETOH 256 (H)  1/14/2024  DIONI Negative  6/5/2024    EtG  Negative  6/5/2024  AMP Negative  6/5/2024    ALC <10  6/5/2024  OPI Negative  6/5/2024    BZO Negative  6/5/2024  MTD Negative  6/5/2024     BAR Negative  6/5/2024  BUP *   *    PCP Negative  6/5/2024  FEN *   *

## 2024-06-10 ENCOUNTER — HOSPITAL ENCOUNTER (OUTPATIENT)
Dept: PSYCHIATRY | Facility: HOSPITAL | Age: 79
Discharge: HOME | End: 2024-06-10
Attending: PSYCHIATRY & NEUROLOGY
Payer: MEDICARE

## 2024-06-10 DIAGNOSIS — F10.20 ALCOHOL USE DISORDER, SEVERE, DEPENDENCE: Primary | Chronic | ICD-10-CM

## 2024-06-10 DIAGNOSIS — Z79.899 LONG-TERM USE OF HIGH-RISK MEDICATION: Primary | ICD-10-CM

## 2024-06-10 DIAGNOSIS — F10.20 ALCOHOL USE DISORDER, SEVERE, DEPENDENCE: Primary | ICD-10-CM

## 2024-06-10 DIAGNOSIS — Z79.899 LONG-TERM USE OF HIGH-RISK MEDICATION: ICD-10-CM

## 2024-06-10 DIAGNOSIS — F41.1 GAD (GENERALIZED ANXIETY DISORDER): ICD-10-CM

## 2024-06-10 LAB
AMPHET+METHAMPHET UR QL: NEGATIVE
BARBITURATES UR QL SCN>200 NG/ML: NEGATIVE
BENZODIAZ UR QL SCN>200 NG/ML: NEGATIVE
BZE UR QL SCN: NEGATIVE
CANNABINOIDS UR QL SCN: NEGATIVE
CLINICAL BIOCHEMIST REVIEW: NORMAL
CREAT UR-MCNC: 101 MG/DL (ref 23–375)
ETHANOL UR-MCNC: <10 MG/DL
ETHYL GLUCURONIDE: NEGATIVE NG/ML
METHADONE UR QL SCN>300 NG/ML: NEGATIVE
OPIATES UR QL SCN: NEGATIVE
PCP UR QL SCN>25 NG/ML: NEGATIVE
PLPETH BLD-MCNC: <10 NG/ML
POPETH BLD-MCNC: <10 NG/ML
TOXICOLOGY INFORMATION: NORMAL

## 2024-06-10 PROCEDURE — 99232 SBSQ HOSP IP/OBS MODERATE 35: CPT | Mod: ,,, | Performed by: PSYCHIATRY & NEUROLOGY

## 2024-06-10 PROCEDURE — 80307 DRUG TEST PRSMV CHEM ANLYZR: CPT | Performed by: PSYCHIATRY & NEUROLOGY

## 2024-06-10 PROCEDURE — 90853 GROUP PSYCHOTHERAPY: CPT | Performed by: SOCIAL WORKER

## 2024-06-10 NOTE — PROGRESS NOTES
FOLLOW UP VISIT: [facility]  DATE OF ADMISSION: 5/7/24    ASSESSMENT AND PLAN:     DIAGNOSES & PROBLEMS:  Alcohol use disorder, severe  Generalized anxiety disorder     In Summary:  Pt is a 79yo male presenting to ORP today to continue treatment for alcohol use disorder.  Pt had presentation to ED in January 2024 which was found to be alcohol intoxication.  Since that event, pt went to inpatient detox/rehab at the insistence of his wife and daughter.  Pt reports relapse since discharge of a few drinks, but denies persistent or daily alcohol use.  Last drink was reportedly about a week prior to admission.  He feels that his current medications have been very helpful and denies medication side effects.  Pt is motivated to be in the program at this time due to the insistence of his wife and daughter.       Plan:  -Start Antabuse 250 mg QD  -Ctn Zoloft 100mg daily    -Ctn naltrexone 50mg daily        - continue ORP and program protocol; while in program will continue to monitor routine VS, breathalyzer and alcohol/drug screenings  - continue MAT as prescribed  - monitor random drug/alcohol screening  - routine monitoring of PETH levels to assess for maintenance of sobriety  - counseled on full abstinence from alcohol and substances of abuse (illicit and prescription)  - full engagement in 12 step (or equivalent) recovery program(s), including meeting attendance and acquisition/maintenance of sponsor     INTERVAL HISTORY AND ROS:   Reports having a good weekend.  Attended AA once on Saturday.  Plans to attend 3 meetings this week.  He is currently on MWF schedule.  He is agreeable to Family Meeting Wednesday at 930/10AM.  He denies alcohol since his slip up 2 weeks ago.  He has not started antabuse yet, plans to start tomorrow.   Denies any SI, depression or anxiety symptoms today. He is aware of how this medication works.            WITHDRAWAL SYMPTOMS: no  CRAVINGS: no    CURRENT PSYCHOTROPIC  REGIMEN:  Zoloft 100mg daily  Naltrexone 50mg daily  Antabuse 250mg daily      PERTINENT PAST HISTORY AND CHART REVIEW:   Per Chart:  Pt with presentation January 2024 to ED for slurred speech.  Stroke workup was negative and patient was found to have an EtOH level of 256.  Pt had driven from [place] himself for the evaluation.  Per chart review, his wife said that they were on a cruise recently a couple of weeks ago and said that he had been hiding bottles of alcohol from her. She thought that he had stopped drinking.        Per Patient:  Pt says since the pandemic he has been drinking much more heavily.  Alcohol use has increased since then.  IN September he fell and broke his elbow when on the golf course.  Pt was untruthful with his wife who was concerned about his alcohol use around November or December.  In January he had a drink that morning and was very concerned he was having a seizure or something bad.  He was evaluated in the ED and found to have alcohol intoxication.  Daughter and wife insisted he needed inpatient detox.  He went to a facility in [place], [place] for 4 weeks mid January to mid February.  When coming home from the program, he started drinking some again.  Last drink was 1 week ago.  He did a couple of shots at that time, but didn't keep drinking because he didn't find it pleasurable.  Pt is on naltrexone and finds it to be helpful.  Gabapentin was also started while in rehab.  Since cutting back and recently stopping drinking, he feels physically much better.  He also sleeps well since he stopped.  When drinking at his heaviest, states he was drinking 3-4 glasses of wine once daily every day.  Denies having withdrawal symptoms when stopping.  Denies cravings.  He is not currently in a 12 step program.  Pt is in the ORP at the insistence of his wife and daughter, who feels he should have more support.        Pt taking Zoloft 100mg daily.  This was originally started by his PCP after  fracture of his elbow.  Pt says that his daughter specifically asked for him to be on Zoloft and he isn't sure why it was started.  He feels that helps him sleep because his mind isn't thinking as much when he tries to sleep.  Denies ever feeling sad/depressed or anxiety. Denies any side effects to current medications.       EXAMINATION:     There were no vitals taken for this visit.    MENTAL STATUS EXAMINATION:  General Appearance & Behavior:  adequately groomed, appropriately dressed, in no apparent distress, under good behavioral control  Involuntary Movements and Motor Activity:  no abnormal involuntary movements noted, no psychomotor agitation or retardation  Speech & Language: conversational, spontaneous, speaks and understands English proficiently  Mood: good  Affect:  normal, euthymic, reactive, full-range, mood-congruent, appropriate to situation and context  Thought Process & Associations: linear and goal-directed, with no loosening of associations  Thought Content & Perceptions:  no suicidal or homicidal ideation, no evidence of psychosis  Sensorium and Cognition:  grossly intact, no significant deficits noted  Insight & Judgment:  intact, demonstrates awareness of illness and adequate/appropriate behavior given the circumstances      RISK MANAGEMENT:     I[]I Y  I[x]I N  I[]I U  I[]I A  Suicidal Ideation/Behavior:   I[]I Y  I[x]I N  I[]I U  I[]I A  Homicidal Ideation/Behavior:  I[]I Y  I[x]I N  I[]I U  I[]I A  Violence:  I[]I Y  I[x]I N  I[]I U  I[]I A  Self-Injurious Behavior:     The patient is deemed to be a reliable and factually accurate historian.    I[]I Y  I[x]I N  I[]I U  I[]I A  I[]I N/A  Minimization of Risk Parameters Suspected/Evident:   I[]I Y  I[x]I N  I[]I U  I[]I A  I[]I N/A  Exaggeration of Risk Parameters Suspected/Evident:       [] Y  [x] N  Danger to Self:   [] Y  [x] N  Danger to Others:   [] Y  [x] N  Grave Disability:     The patient was evaluated for psychiatric  admission and found not to meet the criteria at this time.  The patient can be safely and effectively managed in a less restrictive level of care.    In cases of emergency, daily coverage provided by Acute/ER Psych MD, NP, PA, or SW, with contact numbers located in [facility] On Call Schedule.      Ronaldo Schultz MD  Addiction Psychiatry PGY-3      06/10/2024      KEY:     I[]I Y = Yes / Present / Endorses  I[]I N = No / Absent / Denies  I[]I U = Unknown / Unable to Assess / Unwilling to Participate  I[]I A = Ambiguity Exists / Accuracy Uncertain  I[]I D = Denial or Minimization is Suspected/Evident  I[]I N/A = Non-Applicable    CHART REVIEW:     Available documentation has been reviewed, and pertinent elements of the chart - including previous psychiatric evaluations - have been incorporated into this evaluation where appropriate.    ADVICE AND COUNSELING:     [x] In cases of emergencies (e.g. SI/HI resulting in danger to self or others, functioning deteriorates to the level of grave disability), call 911 or 988, or present to the emergency department for immediate assistance.  [x] Individuals should not operate a motor vehicle or heavy machinery if effects of medications or underlying symptoms/condition impair the ability to safely do so.    Alcohol, Tobacco, and Drug Counseling, as well as resources, has been provided, as warranted.     Shared medical decision making and informed consent are the hallmark and bedrock of good clinical care, and as such have been employed and obtained, respectively, to the degree possible.      Risk Mitigation Strategies, Harm Reduction Techniques, and Safety Netting are important interventions that can reduce acute and chronic risk, and as such have been employed to the degree possible.    Prescription Drug Management entails the review, recommendation, or consideration without recommendation of medications, and as such was employed during the  encounter.    Additional Psychoeducation has been provided, as warranted.    Discussed, to the extent possible, diagnosis, risks and benefits of proposed treatment vs alternative treatments vs no treatment, potential side effects of these treatments and the inherent unpredictability of treatment. The patient expresses understanding of the above and displays the capacity to agree with this treatment given said understanding. Patient also agrees that, currently, the benefits outweigh the risks and consents to treatment at this time.     Written material has been provided to supplement, augment, and reinforce any discussions and interventions, via the AVS or other pre-printed handouts, as warranted.      DIAGNOSTIC TESTING:     The chart was reviewed for recent diagnostic procedures and investigations, and pertinent results are noted below.     Glu 71  6/7/2024  Li *   *  TSH 0.698  1/14/2024    HgA1c 5.4  9/7/2023  VPA *   *   FT4 0.82  1/14/2024    Na 142  6/7/2024  CLZ *   *  WBC 7.16  1/14/2024    Cr 0.9  6/7/2024  ANC 4.5; 63.3;   1/14/2024   Hgb 14.8  1/14/2024     BUN 23  6/7/2024  Trop I *   *  HCT 42.6  1/14/2024     GFR >60.0  6/7/2024   CPK *   *    1/14/2024     Alb 4.0  6/7/2024   PRL *   *  B12 *   *     T Bili 0.9  6/7/2024  Chol 134  1/14/2024  B9 *   *    ALP 60  6/7/2024  TGs 144  1/14/2024  B1 *   *    AST 17  6/7/2024  HDL 76 (H)  1/14/2024  Vit D *   *     ALT 13  6/7/2024  LDL 29.2 (L)  1/14/2024  HIV Non-reactive  1/14/2024     INR 1.0  1/14/2024  Flor *   *   Hep C Non-reactive  1/14/2024    GGT *   *  Lip *   *  RPR *   *    MCV 95  1/14/2024   NH4 *   *  UPT *   *      PETH 18  5/23/2024  THC Negative  6/7/2024    ETOH 256 (H)  1/14/2024  DIONI Negative  6/7/2024    EtG Negative  6/5/2024  AMP Negative  6/7/2024    ALC <10  6/7/2024  OPI Negative  6/7/2024    BZO Negative  6/7/2024  MTD Negative  6/7/2024     BAR Negative  6/7/2024  BUP  *   *    PCP Negative  6/7/2024  FEN *   *

## 2024-06-10 NOTE — PLAN OF CARE
06/10/24 1300   Activity/Group Therapy Checklist   Group Goals/Reflection   Attendance Attended   Follows Direction Followed directions   Group Interactions/Observations Interacted appropriately   Affect/Mood Range Normal range   Affect/Mood Display Appropriate   Goal Progression Progressing

## 2024-06-10 NOTE — PROGRESS NOTES
Group Psychotherapy (PhD/LCSW)     Site: [facility]     Clinical status of patient: Intensive Outpatient Program (IOP)     Date: 06/10/2024      Group Focus: Distress Tolerance      Length of service: 26584 - 45-60 minutes     Number of patients in attendance: 11     Referred by: [facility]      Target symptoms: Substance Abuse     Patient's response to treatment: Active Listening      Progress toward goals: Progressing adequately     Interval History: Session focus was Distress Tolerance: Self-Soothe.  Patients were encouraged to use crisis survival skills to reduce intensity of distress.  Each patient identified something soothing for all five senses.    Diagnosis:     ICD-10-CM ICD-9-CM   1. Alcohol use disorder, severe, dependence  F10.20 303.90   2. LINDSAY (generalized anxiety disorder)  F41.1 300.02         Plan: Continue treatment on ORP

## 2024-06-10 NOTE — PROGRESS NOTES
Group Psychotherapy (PhD/LCSW)     Site: [facility]     Clinical status of patient: Intensive Outpatient Program (IOP)     Date: 06/10/2024      Group Focus: Sleep 101     Length of service: 55956 - 45-60 minutes     Number of patients in attendance: 11     Referred by: [facility]      Target symptoms: Substance Abuse     Patient's response to treatment: Active Listening      Progress toward goals: Progressing adequately     Interval History: STIMULUS CONTROL and SLEEP COMPRESSION   Session focus was on stimulus control and sleep compression. Clinician and patient discussed associating beds with wakefulness and how to disrupt the connection. Clinician also discussed the use of sleep compression to improve sleep quality and stimulate sleep drive. Patients reviewed factors contributing to sleep hygiene.  Patients reviewed sleep diaries and strategies for implementing stimulus control and sleep compression.     Diagnosis:     ICD-10-CM ICD-9-CM   1. Alcohol use disorder, severe, dependence  F10.20 303.90   2. LINDSAY (generalized anxiety disorder)  F41.1 300.02         Plan: Continue treatment on ORP

## 2024-06-12 ENCOUNTER — HOSPITAL ENCOUNTER (OUTPATIENT)
Dept: PSYCHIATRY | Facility: HOSPITAL | Age: 79
Discharge: HOME | End: 2024-06-12
Attending: PSYCHIATRY & NEUROLOGY
Payer: MEDICARE

## 2024-06-12 VITALS
SYSTOLIC BLOOD PRESSURE: 124 MMHG | RESPIRATION RATE: 18 BRPM | HEART RATE: 72 BPM | TEMPERATURE: 98 F | DIASTOLIC BLOOD PRESSURE: 66 MMHG

## 2024-06-12 DIAGNOSIS — F10.20 ALCOHOL USE DISORDER, SEVERE, DEPENDENCE: Primary | Chronic | ICD-10-CM

## 2024-06-12 DIAGNOSIS — Z79.899 LONG-TERM USE OF HIGH-RISK MEDICATION: Primary | ICD-10-CM

## 2024-06-12 DIAGNOSIS — F41.1 GAD (GENERALIZED ANXIETY DISORDER): ICD-10-CM

## 2024-06-12 DIAGNOSIS — Z79.899 LONG-TERM USE OF HIGH-RISK MEDICATION: ICD-10-CM

## 2024-06-12 LAB
AMPHET+METHAMPHET UR QL: NEGATIVE
BARBITURATES UR QL SCN>200 NG/ML: NEGATIVE
BENZODIAZ UR QL SCN>200 NG/ML: NEGATIVE
BZE UR QL SCN: NEGATIVE
CANNABINOIDS UR QL SCN: NEGATIVE
CREAT UR-MCNC: 45 MG/DL (ref 23–375)
ETHANOL UR-MCNC: <10 MG/DL
ETHYL GLUCURONIDE: NEGATIVE NG/ML
METHADONE UR QL SCN>300 NG/ML: NEGATIVE
OPIATES UR QL SCN: NEGATIVE
PCP UR QL SCN>25 NG/ML: NEGATIVE
TOXICOLOGY INFORMATION: NORMAL

## 2024-06-12 PROCEDURE — 99232 SBSQ HOSP IP/OBS MODERATE 35: CPT | Mod: ,,, | Performed by: PSYCHIATRY & NEUROLOGY

## 2024-06-12 PROCEDURE — 90853 GROUP PSYCHOTHERAPY: CPT | Mod: ,,, | Performed by: PSYCHOLOGIST

## 2024-06-12 PROCEDURE — 90847 FAMILY PSYTX W/PT 50 MIN: CPT | Mod: ,,, | Performed by: PSYCHIATRY & NEUROLOGY

## 2024-06-12 PROCEDURE — 80307 DRUG TEST PRSMV CHEM ANLYZR: CPT | Performed by: PSYCHIATRY & NEUROLOGY

## 2024-06-12 PROCEDURE — 90853 GROUP PSYCHOTHERAPY: CPT | Performed by: SOCIAL WORKER

## 2024-06-12 PROCEDURE — 90853 GROUP PSYCHOTHERAPY: CPT

## 2024-06-12 NOTE — PLAN OF CARE
06/12/24 1453   Activity/Group Therapy Checklist   Group Other (Comments)  (Life Story)   Attendance Attended   Follows Direction Followed directions   Group Interactions/Observations Interacted appropriately;Alert;Supportive;Sharing   Affect/Mood Range Normal range   Affect/Mood Display Appropriate   Goal Progression Progressing     Patient presented life story to group members.     LMOM for pt to return call.

## 2024-06-12 NOTE — PROGRESS NOTES
FOLLOW UP VISIT: [facility]  DATE OF ADMISSION: 5/7/24    ASSESSMENT AND PLAN:     DIAGNOSES & PROBLEMS:  Alcohol use disorder, severe  Generalized anxiety disorder     In Summary:  Pt is a 79yo male presenting to ORP today to continue treatment for alcohol use disorder.  Pt had presentation to ED in January 2024 which was found to be alcohol intoxication.  Since that event, pt went to inpatient detox/rehab at the insistence of his wife and daughter.  Pt reports relapse since discharge of a few drinks, but denies persistent or daily alcohol use.  Last drink was reportedly about a week prior to admission.  He feels that his current medications have been very helpful and denies medication side effects.  Pt is motivated to be in the program at this time due to the insistence of his wife and daughter.       Plan:  -Start Antabuse 250 mg QD  -Ctn Zoloft 100mg daily    -Ctn naltrexone 50mg daily        - continue ORP and program protocol; while in program will continue to monitor routine VS, breathalyzer and alcohol/drug screenings  - continue MAT as prescribed  - monitor random drug/alcohol screening  - routine monitoring of PETH levels to assess for maintenance of sobriety  - counseled on full abstinence from alcohol and substances of abuse (illicit and prescription)  - full engagement in 12 step (or equivalent) recovery program(s), including meeting attendance and acquisition/maintenance of sponsor     INTERVAL HISTORY AND ROS:   Family meeting today with iLsa Henderson (daughter) [phone].  Went well.  Discussed points of concern, triggers of boredom and being at his office alone.  Discussed the potential for hobbies such as golf on the weekends with his son and law.    Reports compliance with antabuse and naltrexone.  No s/e.  Labwork wnl.    Attending AA.  He hopes to become a sponsor himself one day.    His daughter is a strong source of support in addition to his other family members.  Upcoming trip  to [place] may be a trigger with frequent opportunities for wine tasting.  His family will be with him for support.       WITHDRAWAL SYMPTOMS: no  CRAVINGS: no    CURRENT PSYCHOTROPIC REGIMEN:  Zoloft 100mg daily  Naltrexone 50mg daily  Antabuse 250mg daily      PERTINENT PAST HISTORY AND CHART REVIEW:   Per Chart:  Pt with presentation January 2024 to ED for slurred speech.  Stroke workup was negative and patient was found to have an EtOH level of 256.  Pt had driven from [place] himself for the evaluation.  Per chart review, his wife said that they were on a cruise recently a couple of weeks ago and said that he had been hiding bottles of alcohol from her. She thought that he had stopped drinking.        Per Patient:  Pt says since the pandemic he has been drinking much more heavily.  Alcohol use has increased since then.  IN September he fell and broke his elbow when on the golf course.  Pt was untruthful with his wife who was concerned about his alcohol use around November or December.  In January he had a drink that morning and was very concerned he was having a seizure or something bad.  He was evaluated in the ED and found to have alcohol intoxication.  Daughter and wife insisted he needed inpatient detox.  He went to a facility in [place], [place] for 4 weeks mid January to mid February.  When coming home from the program, he started drinking some again.  Last drink was 1 week ago.  He did a couple of shots at that time, but didn't keep drinking because he didn't find it pleasurable.  Pt is on naltrexone and finds it to be helpful.  Gabapentin was also started while in rehab.  Since cutting back and recently stopping drinking, he feels physically much better.  He also sleeps well since he stopped.  When drinking at his heaviest, states he was drinking 3-4 glasses of wine once daily every day.  Denies having withdrawal symptoms when stopping.  Denies cravings.  He is not currently in a 12 step program.  Pt  is in the ORP at the insistence of his wife and daughter, who feels he should have more support.        Pt taking Zoloft 100mg daily.  This was originally started by his PCP after fracture of his elbow.  Pt says that his daughter specifically asked for him to be on Zoloft and he isn't sure why it was started.  He feels that helps him sleep because his mind isn't thinking as much when he tries to sleep.  Denies ever feeling sad/depressed or anxiety. Denies any side effects to current medications.       EXAMINATION:     /66   Pulse 72   Temp 98 °F (36.7 °C) (Oral)   Resp 18     MENTAL STATUS EXAMINATION:  General Appearance & Behavior:  adequately groomed, appropriately dressed, in no apparent distress, under good behavioral control  Involuntary Movements and Motor Activity:  no abnormal involuntary movements noted, no psychomotor agitation or retardation  Speech & Language: conversational, spontaneous, speaks and understands English proficiently  Mood: good  Affect:  normal, euthymic, reactive, full-range, mood-congruent, appropriate to situation and context  Thought Process & Associations: linear and goal-directed, with no loosening of associations  Thought Content & Perceptions:  no suicidal or homicidal ideation, no evidence of psychosis  Sensorium and Cognition:  grossly intact, no significant deficits noted  Insight & Judgment:  intact, demonstrates awareness of illness and adequate/appropriate behavior given the circumstances      RISK MANAGEMENT:     I[]I Y  I[x]I N  I[]I U  I[]I A  Suicidal Ideation/Behavior:   I[]I Y  I[x]I N  I[]I U  I[]I A  Homicidal Ideation/Behavior:  I[]I Y  I[x]I N  I[]I U  I[]I A  Violence:  I[]I Y  I[x]I N  I[]I U  I[]I A  Self-Injurious Behavior:     The patient is deemed to be a reliable and factually accurate historian.    I[]I Y  I[x]I N  I[]I U  I[]I A  I[]I N/A  Minimization of Risk Parameters Suspected/Evident:   I[]I Y  I[x]I N  I[]I U  I[]I A  I[]I N/A  Exaggeration  of Risk Parameters Suspected/Evident:       [] Y  [x] N  Danger to Self:   [] Y  [x] N  Danger to Others:   [] Y  [x] N  Grave Disability:     The patient was evaluated for psychiatric admission and found not to meet the criteria at this time.  The patient can be safely and effectively managed in a less restrictive level of care.    In cases of emergency, daily coverage provided by Acute/ER Psych MD, NP, PA, or SW, with contact numbers located in [facility] On Call Schedule.      Ronaldo Schultz MD  Addiction Psychiatry PGY-5      06/12/2024      KEY:     I[]I Y = Yes / Present / Endorses  I[]I N = No / Absent / Denies  I[]I U = Unknown / Unable to Assess / Unwilling to Participate  I[]I A = Ambiguity Exists / Accuracy Uncertain  I[]I D = Denial or Minimization is Suspected/Evident  I[]I N/A = Non-Applicable    CHART REVIEW:     Available documentation has been reviewed, and pertinent elements of the chart - including previous psychiatric evaluations - have been incorporated into this evaluation where appropriate.    ADVICE AND COUNSELING:     [x] In cases of emergencies (e.g. SI/HI resulting in danger to self or others, functioning deteriorates to the level of grave disability), call 911 or 988, or present to the emergency department for immediate assistance.  [x] Individuals should not operate a motor vehicle or heavy machinery if effects of medications or underlying symptoms/condition impair the ability to safely do so.    Alcohol, Tobacco, and Drug Counseling, as well as resources, has been provided, as warranted.     Shared medical decision making and informed consent are the hallmark and bedrock of good clinical care, and as such have been employed and obtained, respectively, to the degree possible.      Risk Mitigation Strategies, Harm Reduction Techniques, and Safety Netting are important interventions that can reduce acute and chronic risk, and as such have been employed to the  degree possible.    Prescription Drug Management entails the review, recommendation, or consideration without recommendation of medications, and as such was employed during the encounter.    Additional Psychoeducation has been provided, as warranted.    Discussed, to the extent possible, diagnosis, risks and benefits of proposed treatment vs alternative treatments vs no treatment, potential side effects of these treatments and the inherent unpredictability of treatment. The patient expresses understanding of the above and displays the capacity to agree with this treatment given said understanding. Patient also agrees that, currently, the benefits outweigh the risks and consents to treatment at this time.     Written material has been provided to supplement, augment, and reinforce any discussions and interventions, via the AVS or other pre-printed handouts, as warranted.      DIAGNOSTIC TESTING:     The chart was reviewed for recent diagnostic procedures and investigations, and pertinent results are noted below.     Glu 71  6/7/2024  Li *   *  TSH 0.698  1/14/2024    HgA1c 5.4  9/7/2023  VPA *   *   FT4 0.82  1/14/2024    Na 142  6/7/2024  CLZ *   *  WBC 7.16  1/14/2024    Cr 0.9  6/7/2024  ANC 4.5; 63.3;   1/14/2024   Hgb 14.8  1/14/2024     BUN 23  6/7/2024  Trop I *   *  HCT 42.6  1/14/2024     GFR >60.0  6/7/2024   CPK *   *    1/14/2024     Alb 4.0  6/7/2024   PRL *   *  B12 *   *     T Bili 0.9  6/7/2024  Chol 134  1/14/2024  B9 *   *    ALP 60  6/7/2024  TGs 144  1/14/2024  B1 *   *    AST 17  6/7/2024  HDL 76 (H)  1/14/2024  Vit D *   *     ALT 13  6/7/2024  LDL 29.2 (L)  1/14/2024  HIV Non-reactive  1/14/2024     INR 1.0  1/14/2024  Flor *   *   Hep C Non-reactive  1/14/2024    GGT *   *  Lip *   *  RPR *   *    MCV 95  1/14/2024   NH4 *   *  UPT *   *      PETH <10  6/7/2024  THC Negative  6/10/2024    ETOH 256 (H)  1/14/2024  DIONI Negative  6/10/2024     EtG Negative  6/7/2024  AMP Negative  6/10/2024    ALC <10  6/10/2024  OPI Negative  6/10/2024    BZO Negative  6/10/2024  MTD Negative  6/10/2024     BAR Negative  6/10/2024  BUP *   *    PCP Negative  6/10/2024  FEN *   *

## 2024-06-12 NOTE — PROGRESS NOTES
Group Psychotherapy (PhD/LCSW)     Site: [facility]     Clinical status of patient: Intensive Outpatient Program (IOP)     Date: 06/12/2024      Group Focus: Mindfulness     Length of service: [id number] - 45-60 minutes     Number of patients in attendance: 13     Referred by: [facility]      Target symptoms: Substance Abuse     Patient's response to treatment: Active Listening      Progress toward goals: Progressing adequately     Interval History: Session focus was Mindfulness: Mindfulness 'What' Skills. Patient's were introduced to mindfulness what skills of observing, describing, participating. Patient's identified the value of each skill, how to use each skill, and practiced each skill in session.     Diagnosis:     ICD-10-CM ICD-9-CM   1. Alcohol use disorder, severe, dependence  F10.20 303.90   2. LINDSAY (generalized anxiety disorder)  F41.1 300.02         Plan: Continue treatment on ORP

## 2024-06-13 NOTE — PATIENT CARE CONFERENCE
Diagnoses:  Alcohol use disorder, severe  LINDSAY     Progress:  Patient was staffed by team. Pt has been appropriate and cooperative in groups. Pt has offered feedback and support to other group members. Pt has not yet acquired a sponsor. Pt has been attending AA groups regularly. Pt completed his family meeting on 6/12. No anticipated discharge date at this time. Team will continue to monitor pt's progress in the program.       Plan of Care:  Pt will continue ORP     Aftercare/Follow ups  Med Management: TBD  Psychotherapy: TBD     Staff present:  MD Ronaldo Gao MD Kevin Jackson, YOUSIF Johns, [facility]  Shelby Chacon, [facility]  Shelby Frye, SW  Jennifer Black, SW  LATESHA Lui

## 2024-06-14 ENCOUNTER — HOSPITAL ENCOUNTER (OUTPATIENT)
Dept: PSYCHIATRY | Facility: HOSPITAL | Age: 79
Discharge: HOME | End: 2024-06-14
Attending: PSYCHIATRY & NEUROLOGY
Payer: MEDICARE

## 2024-06-14 DIAGNOSIS — Z79.899 LONG-TERM USE OF HIGH-RISK MEDICATION: Primary | ICD-10-CM

## 2024-06-14 DIAGNOSIS — F10.20 ALCOHOL USE DISORDER, SEVERE, DEPENDENCE: Primary | Chronic | ICD-10-CM

## 2024-06-14 DIAGNOSIS — F41.1 GAD (GENERALIZED ANXIETY DISORDER): ICD-10-CM

## 2024-06-14 DIAGNOSIS — Z79.899 LONG-TERM USE OF HIGH-RISK MEDICATION: ICD-10-CM

## 2024-06-14 LAB
AMPHET+METHAMPHET UR QL: NEGATIVE
BARBITURATES UR QL SCN>200 NG/ML: NEGATIVE
BENZODIAZ UR QL SCN>200 NG/ML: NEGATIVE
BZE UR QL SCN: NEGATIVE
CANNABINOIDS UR QL SCN: NEGATIVE
CREAT UR-MCNC: 70 MG/DL (ref 23–375)
ETHANOL UR-MCNC: <10 MG/DL
ETHYL GLUCURONIDE: NEGATIVE NG/ML
METHADONE UR QL SCN>300 NG/ML: NEGATIVE
OPIATES UR QL SCN: NEGATIVE
PCP UR QL SCN>25 NG/ML: NEGATIVE
TOXICOLOGY INFORMATION: NORMAL

## 2024-06-14 PROCEDURE — 80307 DRUG TEST PRSMV CHEM ANLYZR: CPT | Performed by: PSYCHIATRY & NEUROLOGY

## 2024-06-14 PROCEDURE — 90853 GROUP PSYCHOTHERAPY: CPT | Performed by: SOCIAL WORKER

## 2024-06-14 PROCEDURE — 90853 GROUP PSYCHOTHERAPY: CPT

## 2024-06-14 PROCEDURE — 99232 SBSQ HOSP IP/OBS MODERATE 35: CPT | Mod: ,,, | Performed by: PSYCHIATRY & NEUROLOGY

## 2024-06-14 NOTE — PLAN OF CARE
06/14/24 1437   Activity/Group Therapy Checklist   Group Meditation/Relaxation   Attendance Attended   Follows Direction Followed directions   Group Interactions/Observations Interacted appropriately;Sharing;Supportive;Alert   Affect/Mood Range Normal range   Affect/Mood Display Appropriate   Goal Progression Progressing

## 2024-06-14 NOTE — PROGRESS NOTES
FOLLOW UP VISIT: [facility]  DATE OF ADMISSION: 5/7/24     ASSESSMENT AND PLAN:      DIAGNOSES & PROBLEMS:  Alcohol use disorder, severe  Generalized anxiety disorder      In Summary:  Pt is a 77yo male presenting to ORP today to continue treatment for alcohol use disorder.  Pt had presentation to ED in January 2024 which was found to be alcohol intoxication.  Since that event, pt went to inpatient detox/rehab at the insistence of his wife and daughter.  Pt reports relapse since discharge of a few drinks, but denies persistent or daily alcohol use.  Last drink was reportedly about a week prior to admission.  He feels that his current medications have been very helpful and denies medication side effects.  Pt is motivated to be in the program at this time due to the insistence of his wife and daughter.       Plan:  -Start Antabuse 250 mg QD  -Ctn Zoloft 100mg daily    -Ctn naltrexone 50mg daily        - continue ORP and program protocol; while in program will continue to monitor routine VS, breathalyzer and alcohol/drug screenings  - continue MAT as prescribed  - monitor random drug/alcohol screening  - routine monitoring of PETH levels to assess for maintenance of sobriety  - counseled on full abstinence from alcohol and substances of abuse (illicit and prescription)  - full engagement in 12 step (or equivalent) recovery program(s), including meeting attendance and acquisition/maintenance of sponsor     INTERVAL HISTORY AND ROS:   Reports compliance with medications, no s/e.  Has been taking antabuse for the last 3 days.  Plans for AA and family over the weekend.  Finds AA incredibly helpful.  Has a sponsor.  No cravings. He has been thinking about hobbies/activities to stay busy outside of work and being at the office.      WITHDRAWAL SYMPTOMS: no  CRAVINGS: no     CURRENT PSYCHOTROPIC REGIMEN:  Zoloft 100mg daily  Naltrexone 50mg daily  Antabuse 250mg daily        PERTINENT PAST HISTORY AND CHART  REVIEW:   Per Chart:  Pt with presentation January 2024 to ED for slurred speech.  Stroke workup was negative and patient was found to have an EtOH level of 256.  Pt had driven from [employer] himself for the evaluation.  Per chart review, his wife said that they were on a cruise recently a couple of weeks ago and said that he had been hiding bottles of alcohol from her. She thought that he had stopped drinking.        Per Patient:  Pt says since the pandemic he has been drinking much more heavily.  Alcohol use has increased since then.  IN September he fell and broke his elbow when on the golf course.  Pt was untruthful with his wife who was concerned about his alcohol use around November or December.  In January he had a drink that morning and was very concerned he was having a seizure or something bad.  He was evaluated in the ED and found to have alcohol intoxication.  Daughter and wife insisted he needed inpatient detox.  He went to a facility in [place], [place] for 4 weeks mid January to mid February.  When coming home from the program, he started drinking some again.  Last drink was 1 week ago.  He did a couple of shots at that time, but didn't keep drinking because he didn't find it pleasurable.  Pt is on naltrexone and finds it to be helpful.  Gabapentin was also started while in rehab.  Since cutting back and recently stopping drinking, he feels physically much better.  He also sleeps well since he stopped.  When drinking at his heaviest, states he was drinking 3-4 glasses of wine once daily every day.  Denies having withdrawal symptoms when stopping.  Denies cravings.  He is not currently in a 12 step program.  Pt is in the ORP at the insistence of his wife and daughter, who feels he should have more support.        Pt taking Zoloft 100mg daily.  This was originally started by his PCP after fracture of his elbow.  Pt says that his daughter specifically asked for him to be on Zoloft and he isn't sure why  it was started.  He feels that helps him sleep because his mind isn't thinking as much when he tries to sleep.  Denies ever feeling sad/depressed or anxiety. Denies any side effects to current medications.        EXAMINATION:      /66   Pulse 72   Temp 98 °F (36.7 °C) (Oral)   Resp 18      MENTAL STATUS EXAMINATION:  General Appearance & Behavior:  adequately groomed, appropriately dressed, in no apparent distress, under good behavioral control  Involuntary Movements and Motor Activity:  no abnormal involuntary movements noted, no psychomotor agitation or retardation  Speech & Language: conversational, spontaneous, speaks and understands English proficiently  Mood: euthymic  Affect:  normal, euthymic, reactive, full-range, mood-congruent, appropriate to situation and context  Thought Process & Associations: linear and goal-directed, with no loosening of associations  Thought Content & Perceptions:  no suicidal or homicidal ideation, no evidence of psychosis  Sensorium and Cognition:  grossly intact, no significant deficits noted  Insight & Judgment:  intact, demonstrates awareness of illness and adequate/appropriate behavior given the circumstances        RISK MANAGEMENT:      I[]I Y  I[x]I N  I[]I U  I[]I A  Suicidal Ideation/Behavior:   I[]I Y  I[x]I N  I[]I U  I[]I A  Homicidal Ideation/Behavior:  I[]I Y  I[x]I N  I[]I U  I[]I A  Violence:  I[]I Y  I[x]I N  I[]I U  I[]I A  Self-Injurious Behavior:      The patient is deemed to be a reliable and factually accurate historian.     I[]I Y  I[x]I N  I[]I U  I[]I A  I[]I N/A  Minimization of Risk Parameters Suspected/Evident:   I[]I Y  I[x]I N  I[]I U  I[]I A  I[]I N/A  Exaggeration of Risk Parameters Suspected/Evident:         [] Y  [x] N  Danger to Self:   [] Y  [x] N  Danger to Others:   [] Y  [x] N  Grave Disability:      The patient was evaluated for psychiatric admission and found not to meet the criteria at this time.  The patient can be  safely and effectively managed in a less restrictive level of care.     In cases of emergency, daily coverage provided by Acute/ER Psych MD, NP, PA, or SW, with contact numbers located in [facility] On Call Schedule.        Ronaldo Schultz MD  Addiction Psychiatry PGY-5        06/12/2024        KEY:      I[]I Y = Yes / Present / Endorses  I[]I N = No / Absent / Denies  I[]I U = Unknown / Unable to Assess / Unwilling to Participate  I[]I A = Ambiguity Exists / Accuracy Uncertain  I[]I D = Denial or Minimization is Suspected/Evident  I[]I N/A = Non-Applicable     CHART REVIEW:      Available documentation has been reviewed, and pertinent elements of the chart - including previous psychiatric evaluations - have been incorporated into this evaluation where appropriate.     ADVICE AND COUNSELING:      [x] In cases of emergencies (e.g. SI/HI resulting in danger to self or others, functioning deteriorates to the level of grave disability), call 911 or 988, or present to the emergency department for immediate assistance.  [x] Individuals should not operate a motor vehicle or heavy machinery if effects of medications or underlying symptoms/condition impair the ability to safely do so.     Alcohol, Tobacco, and Drug Counseling, as well as resources, has been provided, as warranted.      Shared medical decision making and informed consent are the hallmark and bedrock of good clinical care, and as such have been employed and obtained, respectively, to the degree possible.       Risk Mitigation Strategies, Harm Reduction Techniques, and Safety Netting are important interventions that can reduce acute and chronic risk, and as such have been employed to the degree possible.     Prescription Drug Management entails the review, recommendation, or consideration without recommendation of medications, and as such was employed during the encounter.     Additional Psychoeducation has been provided, as warranted.      Discussed, to the extent possible, diagnosis, risks and benefits of proposed treatment vs alternative treatments vs no treatment, potential side effects of these treatments and the inherent unpredictability of treatment. The patient expresses understanding of the above and displays the capacity to agree with this treatment given said understanding. Patient also agrees that, currently, the benefits outweigh the risks and consents to treatment at this time.      Written material has been provided to supplement, augment, and reinforce any discussions and interventions, via the AVS or other pre-printed handouts, as warranted.        DIAGNOSTIC TESTING:      The chart was reviewed for recent diagnostic procedures and investigations, and pertinent results are noted below.      Glu 71  6/7/2024  Li *   *  TSH 0.698  1/14/2024    HgA1c 5.4  9/7/2023  VPA *   *   FT4 0.82  1/14/2024    Na 142  6/7/2024  CLZ *   *  WBC 7.16  1/14/2024    Cr 0.9  6/7/2024  ANC 4.5; 63.3;   1/14/2024   Hgb 14.8  1/14/2024     BUN 23  6/7/2024  Trop I *   *  HCT 42.6  1/14/2024     GFR >60.0  6/7/2024   CPK *   *    1/14/2024     Alb 4.0  6/7/2024   PRL *   *  B12 *   *     T Bili 0.9  6/7/2024  Chol 134  1/14/2024  B9 *   *    ALP 60  6/7/2024  TGs 144  1/14/2024  B1 *   *    AST 17  6/7/2024  HDL 76 (H)  1/14/2024  Vit D *   *     ALT 13  6/7/2024  LDL 29.2 (L)  1/14/2024  HIV Non-reactive  1/14/2024     INR 1.0  1/14/2024  Flor *   *   Hep C Non-reactive  1/14/2024    GGT *   *  Lip *   *  RPR *   *    MCV 95  1/14/2024   NH4 *   *  UPT *   *       PETH <10  6/7/2024  THC Negative  6/10/2024    ETOH 256 (H)  1/14/2024  DIONI Negative  6/10/2024    EtG Negative  6/7/2024  AMP Negative  6/10/2024    ALC <10  6/10/2024  OPI Negative  6/10/2024    BZO Negative  6/10/2024  MTD Negative  6/10/2024     BAR Negative  6/10/2024  BUP *   *    PCP Negative  6/10/2024  FEN *   *

## 2024-06-15 NOTE — PLAN OF CARE
06/12/24 1300   Activity/Group Therapy Checklist   Group Goals/Reflection   Attendance Attended   Follows Direction Followed directions   Group Interactions/Observations Interacted appropriately   Affect/Mood Range Normal range   Affect/Mood Display Appropriate   Goal Progression Progressing

## 2024-06-15 NOTE — PLAN OF CARE
06/14/24 1300   Activity/Group Therapy Checklist   Group Goals/Reflection   Attendance Attended   Follows Direction Followed directions   Group Interactions/Observations Interacted appropriately   Affect/Mood Range Normal range   Affect/Mood Display Appropriate   Goal Progression Progressing

## 2024-06-17 ENCOUNTER — HOSPITAL ENCOUNTER (OUTPATIENT)
Dept: PSYCHIATRY | Facility: HOSPITAL | Age: 79
Discharge: HOME | End: 2024-06-17
Attending: PSYCHIATRY & NEUROLOGY
Payer: MEDICARE

## 2024-06-17 DIAGNOSIS — Z79.899 LONG-TERM USE OF HIGH-RISK MEDICATION: Primary | ICD-10-CM

## 2024-06-17 DIAGNOSIS — Z79.899 LONG-TERM USE OF HIGH-RISK MEDICATION: ICD-10-CM

## 2024-06-17 DIAGNOSIS — F10.20 ALCOHOL USE DISORDER, SEVERE, DEPENDENCE: Primary | Chronic | ICD-10-CM

## 2024-06-17 DIAGNOSIS — F41.1 GAD (GENERALIZED ANXIETY DISORDER): ICD-10-CM

## 2024-06-17 LAB
AMPHET+METHAMPHET UR QL: NEGATIVE
BARBITURATES UR QL SCN>200 NG/ML: NEGATIVE
BENZODIAZ UR QL SCN>200 NG/ML: NEGATIVE
BZE UR QL SCN: NEGATIVE
CANNABINOIDS UR QL SCN: NEGATIVE
CREAT UR-MCNC: 69 MG/DL (ref 23–375)
ETHANOL UR-MCNC: <10 MG/DL
ETHYL GLUCURONIDE: NEGATIVE NG/ML
METHADONE UR QL SCN>300 NG/ML: NEGATIVE
OPIATES UR QL SCN: NEGATIVE
PCP UR QL SCN>25 NG/ML: NEGATIVE
TOXICOLOGY INFORMATION: NORMAL

## 2024-06-17 PROCEDURE — 80307 DRUG TEST PRSMV CHEM ANLYZR: CPT | Performed by: PSYCHIATRY & NEUROLOGY

## 2024-06-17 PROCEDURE — 90853 GROUP PSYCHOTHERAPY: CPT

## 2024-06-17 PROCEDURE — 99232 SBSQ HOSP IP/OBS MODERATE 35: CPT | Mod: ,,, | Performed by: PSYCHIATRY & NEUROLOGY

## 2024-06-17 NOTE — PROGRESS NOTES
Group Psychotherapy (PhD/LCSW)     Site: [facility]     Clinical status of patient: Intensive Outpatient Program (IOP)     Date: 06/17/2024      Group Focus: Sleep 101     Length of service: 88432 - 45-60 minutes     Number of patients in attendance: 14     Referred by: [facility]      Target symptoms: Substance Abuse     Patient's response to treatment: Active Listening      Progress toward goals: Progressing adequately     Interval History: RELAXATION   Session focus was on the use and implementation of relaxation techniques to help improve sleep quality and counteract negative sleep thoughts. Patients explored the connection between arousal and sleep. Patients engaged in relaxation techniques to promote relaxation including diaphragmatic breathing, paced breathing, visualization, body scan mediations and progressive muscle relaxation. Patients reviewed their sleep diaries, made adjustments to sleep compression times, and attended to stimulus control.       Diagnosis:       ICD-10-CM ICD-9-CM   1. Alcohol use disorder, severe, dependence  F10.20 303.90   2. LINDSAY (generalized anxiety disorder)  F41.1 300.02         Plan: Continue treatment on ORP

## 2024-06-17 NOTE — PROGRESS NOTES
FOLLOW UP VISIT: ADDICTION PSYCHIATRY CONSULTATION SERVICE      ASSESSMENT AND PLAN:     DIAGNOSES & PROBLEMS:    Alcohol use disorder, severe  Generalized anxiety disorder      In Summary:  Pt is a 79yo male presenting to ORP today to continue treatment for alcohol use disorder.  Pt had presentation to ED in January 2024 which was found to be alcohol intoxication.  Since that event, pt went to inpatient detox/rehab at the insistence of his wife and daughter.  Pt reports relapse since discharge of a few drinks, but denies persistent or daily alcohol use.  Last drink was reportedly about a week prior to admission.  He feels that his current medications have been very helpful and denies medication side effects.  Pt is motivated to be in the program at this time due to the insistence of his wife and daughter.       Plan:  -Start Antabuse 250 mg QD  -Ctn Zoloft 100mg daily    -Ctn naltrexone 50mg daily        - continue ORP and program protocol; while in program will continue to monitor routine VS, breathalyzer and alcohol/drug screenings  - continue MAT as prescribed  - monitor random drug/alcohol screening  - routine monitoring of PETH levels to assess for maintenance of sobriety  - counseled on full abstinence from alcohol and substances of abuse (illicit and prescription)  - full engagement in 12 step (or equivalent) recovery program(s), including meeting attendance and acquisition/maintenance of sponsor  INTERVAL HISTORY AND ROS:       Mr Paul says he is doing well. Mentions that his mood has improved, he if feeling better. Says that he has been attending groups multiple times a week. Mentions that he is still taking antabuse and naltrexone, switched medication to the evenings felt like it was making him sleep. Mentions that he has been tolerating medication well. Says that medication is working has not noticed any SE/AE. Denies any depressive episode, anxiety cravings or relapse.    CURRENT PSYCHOTROPIC  REGIMEN:  Zoloft 100mg daily  Naltrexone 50mg daily  Antabuse 250mg daily     PERTINENT PAST HISTORY AND CHART REVIEW:     Pt says since the pandemic he has been drinking much more heavily.  Alcohol use has increased since then.  IN September he fell and broke his elbow when on the golf course.  Pt was untruthful with his wife who was concerned about his alcohol use around November or December.  In January he had a drink that morning and was very concerned he was having a seizure or something bad.  He was evaluated in the ED and found to have alcohol intoxication.  Daughter and wife insisted he needed inpatient detox.  He went to a facility in [place], [place] for 4 weeks mid January to mid February.  When coming home from the program, he started drinking some again.  Last drink was 1 week ago.  He did a couple of shots at that time, but didn't keep drinking because he didn't find it pleasurable.  Pt is on naltrexone and finds it to be helpful.  Gabapentin was also started while in rehab.  Since cutting back and recently stopping drinking, he feels physically much better.  He also sleeps well since he stopped.  When drinking at his heaviest, states he was drinking 3-4 glasses of wine once daily every day.  Denies having withdrawal symptoms when stopping.  Denies cravings.  He is not currently in a 12 step program.  Pt is in the ORP at the insistence of his wife and daughter, who feels he should have more support.        Pt taking Zoloft 100mg daily.  This was originally started by his PCP after fracture of his elbow.  Pt says that his daughter specifically asked for him to be on Zoloft and he isn't sure why it was started.  He feels that helps him sleep because his mind isn't thinking as much when he tries to sleep.  Denies ever feeling sad/depressed or anxiety. Denies any side effects to current medications.         EXAMINATION:     There were no vitals taken for this visit.    MENTAL STATUS EXAMINATION:  General  "Appearance & Behavior:    adequately groomed, appropriately dressed, in no apparent distress, under good behavioral control  Involuntary Movements and Motor Activity:   no abnormal involuntary movements noted, no psychomotor agitation or retardation  Speech & Language:   conversational, spontaneous, speaks and understands English proficiently  Mood: "Good"  Affect:  reactive, mood congruent  Thought Process & Associations:   linear and goal-directed, with no loosening of associations  Thought Content & Perceptions:   no suicidal or homicidal ideation, no evidence of psychosis  Sensorium and Cognition:   grossly intact, no significant deficits noted  Insight & Judgment:   intact, demonstrates awareness of illness and adequate/appropriate behavior given the circumstances      RISK MANAGEMENT:     I[]I Y  I[x]I N  I[]I U  I[]I A  Suicidal Ideation/Behavior  I[]I Y  I[x]I N  I[]I U  I[]I A  Homicidal Ideation/Behavior  I[]I Y  I[x]I N  I[]I U  I[]I A  Violence  I[]I Y  I[x]I N  I[]I U  I[]I A  Self-Injurious Behavior    The patient is deemed to be a reliable and factually accurate historian.    I[]I Y  I[x]I N  I[]I U  I[]I A  I[]I N/A  Minimization of Risk Parameters Suspected/Evident  I[]I Y  I[x]I N  I[]I U  I[]I A  I[]I N/A  Exaggeration of Risk Parameters Suspected/Evident    [] Y  [x] N  Danger to Self:   [] Y  [x] N  Danger to Others:   [] Y  [x] N  Grave Disability:       In cases of emergency, daily coverage provided by Acute/ER Psych MD, NP, PA, or SW, with contact numbers located in [facility] On Call Schedule.    Camilo Scott  Department of Psychiatry  [facility]      KEY:     I[]I Y = Yes / Present / Endorses  I[]I N = No / Absent / Denies  I[]I U = Unknown / Unable to Assess / Unwilling to Participate  I[]I A = Ambiguity Exists / Accuracy Uncertain  I[]I D = Denial or Minimization is Suspected/Evident  I[]I N/A = Non-Applicable    CHART REVIEW:     Available " documentation has been reviewed, and pertinent elements of the chart - including previous psychiatric evaluations - have been incorporated into this evaluation where appropriate.    ADVICE AND COUNSELING:     [x] In cases of emergencies (e.g. SI/HI resulting in danger to self or others, functioning deteriorates to the level of grave disability), call 911 or 988, or present to the emergency department for immediate assistance.  [x] Individuals should not operate a motor vehicle or heavy machinery if effects of medications or underlying symptoms/condition impair the ability to safely do so.    Alcohol, Tobacco, and Drug Counseling, as well as resources, has been provided, as warranted.     Shared medical decision making and informed consent are the hallmark and bedrock of good clinical care, and as such have been employed and obtained, respectively, to the degree possible.      Risk Mitigation Strategies, Harm Reduction Techniques, and Safety Netting are important interventions that can reduce acute and chronic risk, and as such have been employed to the degree possible.    Prescription Drug Management entails the review, recommendation, or consideration without recommendation of medications, and as such was employed during the encounter.    Additional Psychoeducation has been provided, as warranted.    Discussed, to the extent possible, diagnosis, risks and benefits of proposed treatment vs alternative treatments vs no treatment, potential side effects of these treatments and the inherent unpredictability of treatment. The patient expresses understanding of the above and displays the capacity to agree with this treatment given said understanding. Patient also agrees that, currently, the benefits outweigh the risks and consents to treatment at this time.     Written material has been provided to supplement, augment, and reinforce any discussions and interventions, via the AVS or other pre-printed handouts, as  warranted.      DIAGNOSTIC TESTING:     The chart was reviewed for recent diagnostic procedures and investigations, and pertinent results are noted below.     Glu 71  6/7/2024  Li *   *  TSH 0.698  1/14/2024    HgA1c 5.4  9/7/2023  VPA *   *   FT4 0.82  1/14/2024    Na 142  6/7/2024  CLZ *   *  WBC 7.16  1/14/2024    Cr 0.9  6/7/2024  ANC 4.5; 63.3;   1/14/2024   Hgb 14.8  1/14/2024     BUN 23  6/7/2024  Trop I *   *  HCT 42.6  1/14/2024     GFR >60.0  6/7/2024   CPK *   *    1/14/2024     Alb 4.0  6/7/2024   PRL *   *  B12 *   *     T Bili 0.9  6/7/2024  Chol 134  1/14/2024  B9 *   *    ALP 60  6/7/2024  TGs 144  1/14/2024  B1 *   *    AST 17  6/7/2024  HDL 76 (H)  1/14/2024  Vit D *   *     ALT 13  6/7/2024  LDL 29.2 (L)  1/14/2024  HIV Non-reactive  1/14/2024     INR 1.0  1/14/2024  Flor *   *   Hep C Non-reactive  1/14/2024    GGT *   *  Lip *   *  RPR *   *    MCV 95  1/14/2024   NH4 *   *  UPT *   *      PETH <10  6/7/2024  THC Negative  6/14/2024    ETOH 256 (H)  1/14/2024  DIONI Negative  6/14/2024    EtG Negative  6/12/2024  AMP Negative  6/14/2024    ALC <10  6/14/2024  OPI Negative  6/14/2024    BZO Negative  6/14/2024  MTD Negative  6/14/2024     BAR Negative  6/14/2024  BUP *   *    PCP Negative  6/14/2024  FEN *   *

## 2024-06-17 NOTE — PLAN OF CARE
06/17/24 1356   Activity/Group Therapy Checklist   Group Other (Comments)  (Processing group)   Attendance Attended   Follows Direction Followed directions   Group Interactions/Observations Interacted appropriately;Alert;Sharing;Supportive   Affect/Mood Range Normal range   Affect/Mood Display Appropriate   Goal Progression Progressing

## 2024-06-17 NOTE — PROGRESS NOTES
Group Psychotherapy (PhD/LCSW)     Site: [facility]     Clinical status of patient: Intensive Outpatient Program (IOP)     Date: 06/17/2024      Group Focus: Distress Tolerance     Length of service: 91145 - 45-60 minutes     Number of patients in attendance: 14     Referred by: [facility]      Target symptoms: Substance Abuse     Patient's response to treatment: Active Listening      Progress toward goals: Progressing adequately     Interval History: Session focus was Distress Tolerance:  IMPROVE.  Patients were encouraged to use imagery, find meaning, use prayer, relax, focus on one thing in the moment, take a brief vacation, and use self-encouragement.      Diagnosis:     ICD-10-CM ICD-9-CM   1. Alcohol use disorder, severe, dependence  F10.20 303.90   2. LINDSAY (generalized anxiety disorder)  F41.1 300.02         Plan: Continue treatment on ORP

## 2024-06-19 ENCOUNTER — HOSPITAL ENCOUNTER (OUTPATIENT)
Dept: PSYCHIATRY | Facility: HOSPITAL | Age: 79
Discharge: HOME | End: 2024-06-19
Attending: STUDENT IN AN ORGANIZED HEALTH CARE EDUCATION/TRAINING PROGRAM
Payer: MEDICARE

## 2024-06-19 DIAGNOSIS — Z79.899 LONG-TERM USE OF HIGH-RISK MEDICATION: Primary | ICD-10-CM

## 2024-06-19 DIAGNOSIS — F41.1 GAD (GENERALIZED ANXIETY DISORDER): ICD-10-CM

## 2024-06-19 DIAGNOSIS — Z79.899 LONG-TERM USE OF HIGH-RISK MEDICATION: ICD-10-CM

## 2024-06-19 DIAGNOSIS — F10.20 ALCOHOL USE DISORDER, SEVERE, DEPENDENCE: Primary | Chronic | ICD-10-CM

## 2024-06-19 LAB
AMPHET+METHAMPHET UR QL: NEGATIVE
BARBITURATES UR QL SCN>200 NG/ML: NEGATIVE
BENZODIAZ UR QL SCN>200 NG/ML: NEGATIVE
BZE UR QL SCN: NEGATIVE
CANNABINOIDS UR QL SCN: NEGATIVE
CREAT UR-MCNC: 74 MG/DL (ref 23–375)
ETHANOL UR-MCNC: <10 MG/DL
ETHYL GLUCURONIDE: NEGATIVE NG/ML
METHADONE UR QL SCN>300 NG/ML: NEGATIVE
OPIATES UR QL SCN: NEGATIVE
PCP UR QL SCN>25 NG/ML: NEGATIVE
TOXICOLOGY INFORMATION: NORMAL

## 2024-06-19 PROCEDURE — 80307 DRUG TEST PRSMV CHEM ANLYZR: CPT | Performed by: PSYCHIATRY & NEUROLOGY

## 2024-06-19 PROCEDURE — 90853 GROUP PSYCHOTHERAPY: CPT | Performed by: SOCIAL WORKER

## 2024-06-19 PROCEDURE — 90853 GROUP PSYCHOTHERAPY: CPT | Mod: ,,, | Performed by: PSYCHOLOGIST

## 2024-06-19 PROCEDURE — 99232 SBSQ HOSP IP/OBS MODERATE 35: CPT | Mod: ,,, | Performed by: PSYCHIATRY & NEUROLOGY

## 2024-06-19 PROCEDURE — 90853 GROUP PSYCHOTHERAPY: CPT

## 2024-06-19 NOTE — PLAN OF CARE
06/19/24 1300   Activity/Group Therapy Checklist   Group Goals/Reflection   Attendance Attended   Follows Direction Followed directions   Group Interactions/Observations Interacted appropriately   Affect/Mood Range Normal range   Affect/Mood Display Appropriate   Goal Progression Progressing

## 2024-06-19 NOTE — PROGRESS NOTES
FOLLOW UP VISIT: ADDICTION PSYCHIATRY CONSULTATION SERVICE      ASSESSMENT AND PLAN:     DIAGNOSES & PROBLEMS:  Alcohol use disorder, severe  Generalized anxiety disorder      In Summary:  Pt is a 79yo male presenting to ORP today to continue treatment for alcohol use disorder.  Pt had presentation to ED in January 2024 which was found to be alcohol intoxication.  Since that event, pt went to inpatient detox/rehab at the insistence of his wife and daughter.  Pt reports relapse since discharge of a few drinks, but denies persistent or daily alcohol use.  Last drink was reportedly about a week prior to admission.  He feels that his current medications have been very helpful and denies medication side effects.  Pt is motivated to be in the program at this time due to the insistence of his wife and daughter.       Plan:  -Continue Antabuse 250 mg QD  -Ctn Zoloft 100mg daily    -Ctn naltrexone 50mg daily        - continue ORP and program protocol; while in program will continue to monitor routine VS, breathalyzer and alcohol/drug screenings  - continue MAT as prescribed  - monitor random drug/alcohol screening  - routine monitoring of PETH levels to assess for maintenance of sobriety  - counseled on full abstinence from alcohol and substances of abuse (illicit and prescription)  - full engagement in 12 step (or equivalent) recovery program(s), including meeting attendance and acquisition/maintenance of sponsor  INTERVAL HISTORY AND ROS:         Mr Paul says he is doing well. Mentions that his mood has been improving. Says that he is looking forward to completing the program. Says that he will continue to stay in treatment such as going to meetings and working out. Expresses his excitement for upcoming travels. Denies any craving or relapse. Mentions he is still taking medication as directed. Denies any SI, plan or intent      CURRENT PSYCHOTROPIC REGIMEN:  Zoloft 100mg daily  Naltrexone 50mg daily  Antabuse 250mg  daily     PERTINENT PAST HISTORY AND CHART REVIEW:      Pt says since the pandemic he has been drinking much more heavily.  Alcohol use has increased since then.  IN September he fell and broke his elbow when on the golf course.  Pt was untruthful with his wife who was concerned about his alcohol use around November or December.  In January he had a drink that morning and was very concerned he was having a seizure or something bad.  He was evaluated in the ED and found to have alcohol intoxication.  Daughter and wife insisted he needed inpatient detox.  He went to a facility in [place], [place] for 4 weeks mid January to mid February.  When coming home from the program, he started drinking some again.  Last drink was 1 week ago.  He did a couple of shots at that time, but didn't keep drinking because he didn't find it pleasurable.  Pt is on naltrexone and finds it to be helpful.  Gabapentin was also started while in rehab.  Since cutting back and recently stopping drinking, he feels physically much better.  He also sleeps well since he stopped.  When drinking at his heaviest, states he was drinking 3-4 glasses of wine once daily every day.  Denies having withdrawal symptoms when stopping.  Denies cravings.  He is not currently in a 12 step program.  Pt is in the ORP at the insistence of his wife and daughter, who feels he should have more support.        Pt taking Zoloft 100mg daily.  This was originally started by his PCP after fracture of his elbow.  Pt says that his daughter specifically asked for him to be on Zoloft and he isn't sure why it was started.  He feels that helps him sleep because his mind isn't thinking as much when he tries to sleep.  Denies ever feeling sad/depressed or anxiety. Denies any side effects to current medications.           EXAMINATION:      There were no vitals taken for this visit.     MENTAL STATUS EXAMINATION:  General Appearance & Behavior:    adequately groomed, appropriately  "dressed, in no apparent distress, under good behavioral control  Involuntary Movements and Motor Activity:   no abnormal involuntary movements noted, no psychomotor agitation or retardation  Speech & Language:   conversational, spontaneous, speaks and understands English proficiently  Mood: "Good"  Affect:  reactive, mood congruent  Thought Process & Associations:   linear and goal-directed, with no loosening of associations  Thought Content & Perceptions:   no suicidal or homicidal ideation, no evidence of psychosis  Sensorium and Cognition:   grossly intact, no significant deficits noted  Insight & Judgment:   intact, demonstrates awareness of illness and adequate/appropriate behavior given the circumstances            RISK MANAGEMENT:     I[]I Y  I[x]I N  I[]I U  I[]I A  Suicidal Ideation/Behavior  I[]I Y  I[x]I N  I[]I U  I[]I A  Homicidal Ideation/Behavior  I[]I Y  I[x]I N  I[]I U  I[]I A  Violence  I[]I Y  I[x]I N  I[]I U  I[]I A  Self-Injurious Behavior:    The patient is deemed to be a reliable and factually accurate historian.    I[]I Y  I[x]I N  I[]I U  I[]I A  I[]I N/A  Minimization of Risk Parameters Suspected/Evident:  I[]I Y  I[x]I N  I[]I U  I[]I A  I[]I N/A  Exaggeration of Risk Parameters Suspected/Evident:     [] Y  [x] N  Danger to Self:   [] Y  [x] N  Danger to Others  [] Y  [x] N  Grave Disability      In cases of emergency, daily coverage provided by Acute/ER Psych MD, NP, PA, or SW, with contact numbers located in [facility] On Call Schedule.    Camilo Scott  Department of Psychiatry  [facility]      KEY:     I[]I Y = Yes / Present / Endorses  I[]I N = No / Absent / Denies  I[]I U = Unknown / Unable to Assess / Unwilling to Participate  I[]I A = Ambiguity Exists / Accuracy Uncertain  I[]I D = Denial or Minimization is Suspected/Evident  I[]I N/A = Non-Applicable    CHART REVIEW:     Available documentation has been reviewed, and pertinent elements of the " chart - including previous psychiatric evaluations - have been incorporated into this evaluation where appropriate.    ADVICE AND COUNSELING:     [x] In cases of emergencies (e.g. SI/HI resulting in danger to self or others, functioning deteriorates to the level of grave disability), call 911 or 988, or present to the emergency department for immediate assistance.  [x] Individuals should not operate a motor vehicle or heavy machinery if effects of medications or underlying symptoms/condition impair the ability to safely do so.    Alcohol, Tobacco, and Drug Counseling, as well as resources, has been provided, as warranted.     Shared medical decision making and informed consent are the hallmark and bedrock of good clinical care, and as such have been employed and obtained, respectively, to the degree possible.      Risk Mitigation Strategies, Harm Reduction Techniques, and Safety Netting are important interventions that can reduce acute and chronic risk, and as such have been employed to the degree possible.    Prescription Drug Management entails the review, recommendation, or consideration without recommendation of medications, and as such was employed during the encounter.    Additional Psychoeducation has been provided, as warranted.    Discussed, to the extent possible, diagnosis, risks and benefits of proposed treatment vs alternative treatments vs no treatment, potential side effects of these treatments and the inherent unpredictability of treatment. The patient expresses understanding of the above and displays the capacity to agree with this treatment given said understanding. Patient also agrees that, currently, the benefits outweigh the risks and consents to treatment at this time.     Written material has been provided to supplement, augment, and reinforce any discussions and interventions, via the AVS or other pre-printed handouts, as warranted.      DIAGNOSTIC TESTING:     The chart was reviewed for  recent diagnostic procedures and investigations, and pertinent results are noted below.     Glu 71  6/7/2024  Li *   *  TSH 0.698  1/14/2024    HgA1c 5.4  9/7/2023  VPA *   *   FT4 0.82  1/14/2024    Na 142  6/7/2024  CLZ *   *  WBC 7.16  1/14/2024    Cr 0.9  6/7/2024  ANC 4.5; 63.3;   1/14/2024   Hgb 14.8  1/14/2024     BUN 23  6/7/2024  Trop I *   *  HCT 42.6  1/14/2024     GFR >60.0  6/7/2024   CPK *   *    1/14/2024     Alb 4.0  6/7/2024   PRL *   *  B12 *   *     T Bili 0.9  6/7/2024  Chol 134  1/14/2024  B9 *   *    ALP 60  6/7/2024  TGs 144  1/14/2024  B1 *   *    AST 17  6/7/2024  HDL 76 (H)  1/14/2024  Vit D *   *     ALT 13  6/7/2024  LDL 29.2 (L)  1/14/2024  HIV Non-reactive  1/14/2024     INR 1.0  1/14/2024  Flor *   *   Hep C Non-reactive  1/14/2024    GGT *   *  Lip *   *  RPR *   *    MCV 95  1/14/2024   NH4 *   *  UPT *   *      PETH <10  6/7/2024  THC Negative  6/17/2024    ETOH 256 (H)  1/14/2024  DIONI Negative  6/17/2024    EtG Negative  6/14/2024  AMP Negative  6/17/2024    ALC <10  6/17/2024  OPI Negative  6/17/2024    BZO Negative  6/17/2024  MTD Negative  6/17/2024     BAR Negative  6/17/2024  BUP *   *    PCP Negative  6/17/2024  FEN *   *

## 2024-06-19 NOTE — PLAN OF CARE
06/19/24 1101   Activity/Group Therapy Checklist   Group Other (Comments)  (Life Story)   Attendance Attended   Follows Direction Followed directions   Group Interactions/Observations Interacted appropriately;Alert;Sharing;Supportive   Affect/Mood Range Normal range   Affect/Mood Display Appropriate   Goal Progression Progressing     Patient was present during group member's Life Story.

## 2024-06-19 NOTE — PROGRESS NOTES
Group Psychotherapy (PhD/LCSW)     Site: [facility]     Clinical status of patient: Intensive Outpatient Program (IOP)     Date: 06/19/2024      Group Focus: Mindfulness     Length of service: 09826 - 45-60 minutes     Number of patients in attendance: 13     Referred by: [facility]      Target symptoms: Substance Abuse     Patient's response to treatment: Active Listening      Progress toward goals: Progressing adequately     Interval History: Session focus was Mindfulness: Mindfulness 'How' Skills. Patient's were introduced to mindfulness 'how' skills of non-judgmentally, one-mindfulness, and effectiveness. Patient's identified the value of each skill, how to use each skill, and practiced the use of each skill in session.      Diagnosis:     ICD-10-CM ICD-9-CM   1. Alcohol use disorder, severe, dependence  F10.20 303.90   2. LINDSAY (generalized anxiety disorder)  F41.1 300.02         Plan: Continue treatment on ORP

## 2024-06-21 ENCOUNTER — HOSPITAL ENCOUNTER (OUTPATIENT)
Dept: PSYCHIATRY | Facility: HOSPITAL | Age: 79
Discharge: HOME | End: 2024-06-21
Attending: STUDENT IN AN ORGANIZED HEALTH CARE EDUCATION/TRAINING PROGRAM
Payer: MEDICARE

## 2024-06-21 VITALS
RESPIRATION RATE: 18 BRPM | HEART RATE: 75 BPM | DIASTOLIC BLOOD PRESSURE: 50 MMHG | SYSTOLIC BLOOD PRESSURE: 99 MMHG | TEMPERATURE: 98 F

## 2024-06-21 DIAGNOSIS — Z79.899 LONG-TERM USE OF HIGH-RISK MEDICATION: Primary | ICD-10-CM

## 2024-06-21 DIAGNOSIS — Z79.899 LONG-TERM USE OF HIGH-RISK MEDICATION: ICD-10-CM

## 2024-06-21 DIAGNOSIS — F41.1 GAD (GENERALIZED ANXIETY DISORDER): ICD-10-CM

## 2024-06-21 DIAGNOSIS — F10.20 ALCOHOL USE DISORDER, SEVERE, DEPENDENCE: Primary | Chronic | ICD-10-CM

## 2024-06-21 LAB — ETHYL GLUCURONIDE: NEGATIVE NG/ML

## 2024-06-21 PROCEDURE — 80307 DRUG TEST PRSMV CHEM ANLYZR: CPT | Performed by: PSYCHIATRY & NEUROLOGY

## 2024-06-21 PROCEDURE — 90853 GROUP PSYCHOTHERAPY: CPT

## 2024-06-21 PROCEDURE — 99238 HOSP IP/OBS DSCHRG MGMT 30/<: CPT | Mod: ,,, | Performed by: PSYCHIATRY & NEUROLOGY

## 2024-06-21 PROCEDURE — 90853 GROUP PSYCHOTHERAPY: CPT | Performed by: SOCIAL WORKER

## 2024-06-21 RX ORDER — DISULFIRAM 250 MG/1
250 TABLET ORAL DAILY
Qty: 30 TABLET | Refills: 3 | Status: SHIPPED | OUTPATIENT
Start: 2024-06-21 | End: 2025-06-21

## 2024-06-21 NOTE — PLAN OF CARE
06/21/24 1504   Activity/Group Therapy Checklist   Group Meditation/Relaxation   Attendance Attended   Follows Direction Followed directions   Group Interactions/Observations Interacted appropriately;Sharing;Supportive;Alert   Affect/Mood Range Normal range   Affect/Mood Display Appropriate   Goal Progression Progressing

## 2024-06-21 NOTE — PROGRESS NOTES
STAFF NOTE    The chart was reviewed and the case was discussed, including the assessment and management plan.  I have personally interviewed the patient, and agree with the overall findings, with corrections or clarifications, if any, noted herein.    *Patient has successfully completed ORP.  His program was extended due to a brief relapse, but now has regained sobriety and is appropriate for aftercare program.*    Jethro Kiser MD  Board Certification: Psychiatry and Addiction Medicine

## 2024-06-21 NOTE — DISCHARGE SUMMARY
NAME: Monroe Paul  : 1945  MRN: [id number]  DATE OF ADMISSION: 2024  DATE OF DISCHARGE: 2024    Attending Physician: Jethro Kiser MD    Resident/Fellow: Camilo Scott MD     [facility]  Discharge Summary    DIAGNOSES  Alcohol use disorder, moderate   Generalized anxiety disorder     Patient Active Problem List   Diagnosis    Malignant neoplasm of overlapping sites of bladder    Neurological deficit present    Alcohol use disorder, severe, dependence    LINDSAY (generalized anxiety disorder)    Long-term use of high-risk medication       HISTORY OF PRESENT ILLNESS ON ADMIT    Pt says since the pandemic he has been drinking much more heavily.  Alcohol use has increased since then.  IN September he fell and broke his elbow when on the golf [place].  Pt was untruthful with his wife who was concerned about his alcohol use around November or December.  In January he had a drink that morning and was very concerned he was having a seizure or something bad.  He was evaluated in the ED and found to have alcohol intoxication.  Daughter and wife insisted he needed inpatient detox.  He went to a facility in [place], [place] for 4 weeks mid January to mid February.  When coming home from the program, he started drinking some again.  Last drink was 1 week ago.  He did a couple of shots at that time, but didn't keep drinking because he didn't find it pleasurable.  Pt is on naltrexone and finds it to be helpful.  Gabapentin was also started while in rehab.  Since cutting back and recently stopping drinking, he feels physically much better.  He also sleeps well since he stopped.  When drinking at his heaviest, states he was drinking 3-4 glasses of wine once daily every day.  Denies having withdrawal symptoms when stopping.  Denies cravings.  He is not currently in a 12 step program.  Pt is in the ORP at the insistence of his wife and daughter, who feels he should  have more support.          Pt taking Zoloft 100mg daily.  This was originally started by his PCP after fracture of his elbow.  Pt says that his daughter specifically asked for him to be on Zoloft and he isn't sure why it was started.  He feels that helps him sleep because his mind isn't thinking as much when he tries to sleep.  Denies ever feeling sad/depressed or anxiety. Denies any side effects to current .   COURSE OF TREATMENT THROUGHOUT PROGRAM  Mr Paul  is a 77 yo male presenting to ORP today to continue treatment for alcohol use disorder. Pt had presentation to ED in January 2024 which was found to be alcohol intoxication. Since that event, pt went to inpatient detox/rehab at the insistence of his wife and daughter. Pt reports relapse since discharge of a few drinks, but denies persistent or daily alcohol use. Last drink was reportedly about a week prior to admission. He feels that his current medications have been very helpful and denies medication side effects. Pt expressed his  motivated to be in the program due to the insistence of his wife and daughter. Patient participate in group sessions,and showed improvement while in IOP. There was one incident of relapse while in treatment, patient was apologetic and showed improvement after incident. Patient biomarkers and Utox for alcohol use thereafter where unremarkable for duration of stay. At discharge patient showed optimism and enthusiasm for continuing with AA meeting and using skills learning in session to maintain sobriety.       AFTERCARE PLAN  As per discussion at exit interview       DAY OF DISCHARGE EXAMINATION    There were no vitals filed for this visit.      CONSTITUTIONAL  General Appearance: Well groomed in NAD     MUSCULOSKELETAL  Abnormal Involuntary Movements: WNL     PSYCHIATRIC   Mental Status Exam:  Appearance: fair hygiene and grooming, casually dressed, NAD, appears stated age  Behavior/Cooperation: calm, cooperative, pleasant,  "engaged  Language: fluent english  Speech: normal tone, normal rate, normal pitch, normal volume  Mood: "Im feeling good"  Affect: full, reactive, appropriate  Thought Process: linear, logical, goal-directed  Thought Content:  denies SI/HI/paranoia/delusions; no objective evidence of paranoia or delusions  Perception: denies AVH; no objective evidence of AVH   Orientation: grossly intact  Memory: intact to conversation  Attention Span/Concentration: intact to conversation  Fund of Knowledge: appropriate for education level  Insight: good  Judgment: good      RISK PARAMATERS  Patient reports no suicidal ideation  Patient reports no homicidal ideation  Patient reports no self-injurious behavior  Patient reports no violent behavior      DISCHARGE INSTRUCTIONS    DIET  No restrictions    ACTIVITY  Activity as tolerated    CONDITION  Stable    MEDICATIONS  Zoloft 100mg daily  Naltrexone 50mg daily  Antabuse 250mg daily    Take all medications as prescribed.  Follow up with outpatient mental health provider as discussed with treatment team.  Talk to your provider about any concerns or side effects with your medications.  Patient counseled on abstinence from alcohol and substances of abuse (illicit and prescription).  Relapse prevention and motivational interviewing provided.  Call the crisis line at: 1-629.118.9091 for help in a crisis and emergent situations and present to the Emergency Department for any worsening of psychiatric symptoms or any suicidal or homicidal ideation.    PRESCRIPTION DRUG MANAGEMENT  - The risks and benefits of medication were discussed with this patient.  - Possible expectable adverse effects of any current or proposed individual psychotropic agents were discussed with this patient.  - Counseling was provided on the importance of full compliance with medication regimens.      "

## 2024-06-21 NOTE — H&P
NAME: Monroe Paul  : 1945  MRN: [id number]  DATE OF ADMISSION: 2024  DATE OF DISCHARGE: 2024    Attending Physician: Jethro Kiser MD    Resident/Fellow: Camilo Scott MD     [facility]  Discharge Summary    DIAGNOSES  Alcohol use disorder, moderate   Generalized anxiety disorder     Patient Active Problem List   Diagnosis    Malignant neoplasm of overlapping sites of bladder    Neurological deficit present    Alcohol use disorder, severe, dependence    LINDSAY (generalized anxiety disorder)    Long-term use of high-risk medication       HISTORY OF PRESENT ILLNESS ON ADMIT    Pt says since the pandemic he has been drinking much more heavily.  Alcohol use has increased since then.  IN September he fell and broke his elbow when on the golf course.  Pt was untruthful with his wife who was concerned about his alcohol use around November or December.  In January he had a drink that morning and was very concerned he was having a seizure or something bad.  He was evaluated in the ED and found to have alcohol intoxication.  Daughter and wife insisted he needed inpatient detox.  He went to a facility in [place], [place] for 4 weeks mid January to mid February.  When coming home from the program, he started drinking some again.  Last drink was 1 week ago.  He did a couple of shots at that time, but didn't keep drinking because he didn't find it pleasurable.  Pt is on naltrexone and finds it to be helpful.  Gabapentin was also started while in rehab.  Since cutting back and recently stopping drinking, he feels physically much better.  He also sleeps well since he stopped.  When drinking at his heaviest, states he was drinking 3-4 glasses of wine once daily every day.  Denies having withdrawal symptoms when stopping.  Denies cravings.  He is not currently in a 12 step program.  Pt is in the ORP at the insistence of his wife and daughter, who feels he  should have more support.          Pt taking Zoloft 100mg daily.  This was originally started by his PCP after fracture of his elbow.  Pt says that his daughter specifically asked for him to be on Zoloft and he isn't sure why it was started.  He feels that helps him sleep because his mind isn't thinking as much when he tries to sleep.  Denies ever feeling sad/depressed or anxiety. Denies any side effects to current .   COURSE OF TREATMENT THROUGHOUT PROGRAM  Mr Paul  is a 79 yo male presenting to ORP today to continue treatment for alcohol use disorder. Pt had presentation to ED in January 2024 which was found to be alcohol intoxication. Since that event, pt went to inpatient detox/rehab at the insistence of his wife and daughter. Pt reports relapse since discharge of a few drinks, but denies persistent or daily alcohol use. Last drink was reportedly about a week prior to admission. He feels that his current medications have been very helpful and denies medication side effects. Pt expressed his  motivated to be in the program due to the insistence of his wife and daughter. Patient participate in group sessions,and showed improvement while in IOP. There was one incident of relapse while in treatment, patient was apologetic and showed improvement after incident. Patient biomarkers and Utox for alcohol use thereafter where unremarkable for duration of stay. At discharge patient showed optimism and enthusiasm for continuing with AA meeting and using skills learning in session to maintain sobriety.       AFTERCARE PLAN  As per discussion at exit interview       DAY OF DISCHARGE EXAMINATION    There were no vitals filed for this visit.      CONSTITUTIONAL  General Appearance: Well groomed in NAD     MUSCULOSKELETAL  Abnormal Involuntary Movements: WNL     PSYCHIATRIC   Mental Status Exam:  Appearance: fair hygiene and grooming, casually dressed, NAD, appears stated age  Behavior/Cooperation: calm, cooperative, pleasant,  "engaged  Language: fluent english  Speech: normal tone, normal rate, normal pitch, normal volume  Mood: "Im feeling good"  Affect: full, reactive, appropriate  Thought Process: linear, logical, goal-directed  Thought Content:  denies SI/HI/paranoia/delusions; no objective evidence of paranoia or delusions  Perception: denies AVH; no objective evidence of AVH   Orientation: grossly intact  Memory: intact to conversation  Attention Span/Concentration: intact to conversation  Fund of Knowledge: appropriate for education level  Insight: good  Judgment: good      RISK PARAMATERS  Patient reports no suicidal ideation  Patient reports no homicidal ideation  Patient reports no self-injurious behavior  Patient reports no violent behavior      DISCHARGE INSTRUCTIONS    DIET  No restrictions    ACTIVITY  Activity as tolerated    CONDITION  Stable    MEDICATIONS  Zoloft 100mg daily  Naltrexone 50mg daily  Antabuse 250mg daily    Take all medications as prescribed.  Follow up with outpatient mental health provider as discussed with treatment team.  Talk to your provider about any concerns or side effects with your medications.  Patient counseled on abstinence from alcohol and substances of abuse (illicit and prescription).  Relapse prevention and motivational interviewing provided.  Call the crisis line at: 1-754.710.5250 for help in a crisis and emergent situations and present to the Emergency Department for any worsening of psychiatric symptoms or any suicidal or homicidal ideation.    PRESCRIPTION DRUG MANAGEMENT  - The risks and benefits of medication were discussed with this patient.  - Possible expectable adverse effects of any current or proposed individual psychotropic agents were discussed with this patient.  - Counseling was provided on the importance of full compliance with medication regimens.      "

## 2024-06-22 LAB — ETHYL GLUCURONIDE: NEGATIVE NG/ML

## 2024-06-24 NOTE — PLAN OF CARE
06/21/24 1300   Activity/Group Therapy Checklist   Group Goals/Reflection   Attendance Attended   Follows Direction Followed directions   Group Interactions/Observations Interacted appropriately   Affect/Mood Range Normal range   Affect/Mood Display Appropriate   Goal Progression Progressing

## 2024-06-25 ENCOUNTER — CLINICAL SUPPORT (OUTPATIENT)
Dept: PSYCHIATRY | Facility: CLINIC | Age: 79
End: 2024-06-25
Payer: MEDICARE

## 2024-06-25 DIAGNOSIS — F10.21 ALCOHOL USE DISORDER, MODERATE, IN EARLY REMISSION: Primary | ICD-10-CM

## 2024-06-25 PROCEDURE — 90853 GROUP PSYCHOTHERAPY: CPT | Mod: ,,, | Performed by: SOCIAL WORKER

## 2024-06-29 NOTE — PROGRESS NOTES
Group Psychotherapy    Site: [facility]    Clinical status of patient: Outpatient    6/25/2024    Length of service:[id number]    Referred by: [facility]     Number of patients in attendance: 4     Target symptoms: alcohol abuse    Patient's response to intervention:  The patient's response to intervention is active listening, self-disclosure.    Progress toward goals and other mental status changes:  The patient's progress toward goals is good.    Interval history: Patient returned to the clinic today for group therapy session.  Introduced himself to the group, as this was his first aftercare session.  He successfully completed the ORP-IOP.  Treated for alcohol use disorder.  No relapses following d/c from IOP.  Denies cravings.  He has been attending AA meetings regularly.  Has a sponsor.  Taking Antabuse daily.          Diagnosis: Alcohol Use Disorder     Plan: group psychotherapy    Return to clinic: as scheduled

## 2024-07-01 ENCOUNTER — OFFICE VISIT (OUTPATIENT)
Dept: UROLOGY | Facility: CLINIC | Age: 79
End: 2024-07-01
Payer: MEDICARE

## 2024-07-01 VITALS
HEIGHT: 72 IN | DIASTOLIC BLOOD PRESSURE: 84 MMHG | BODY MASS INDEX: 25.2 KG/M2 | WEIGHT: 186.06 LBS | HEART RATE: 68 BPM | SYSTOLIC BLOOD PRESSURE: 144 MMHG

## 2024-07-01 DIAGNOSIS — C67.8 MALIGNANT NEOPLASM OF OVERLAPPING SITES OF BLADDER: Primary | ICD-10-CM

## 2024-07-01 PROCEDURE — 99499 UNLISTED E&M SERVICE: CPT | Mod: S$PBB,,, | Performed by: NURSE PRACTITIONER

## 2024-07-01 PROCEDURE — 99999PBSHW POCT URINE DIPSTICK WITHOUT MICROSCOPE: Mod: PBBFAC,,,

## 2024-07-01 PROCEDURE — 99999 PR PBB SHADOW E&M-EST. PATIENT-LVL V: CPT | Mod: PBBFAC,,, | Performed by: NURSE PRACTITIONER

## 2024-07-01 PROCEDURE — 51720 TREATMENT OF BLADDER LESION: CPT | Mod: PBBFAC | Performed by: NURSE PRACTITIONER

## 2024-07-01 PROCEDURE — 51720 TREATMENT OF BLADDER LESION: CPT | Mod: S$PBB,,, | Performed by: NURSE PRACTITIONER

## 2024-07-01 PROCEDURE — 99215 OFFICE O/P EST HI 40 MIN: CPT | Mod: PBBFAC | Performed by: NURSE PRACTITIONER

## 2024-07-01 PROCEDURE — 99999PBSHW PR PBB SHADOW TECHNICAL ONLY FILED TO HB: Mod: PBBFAC,,,

## 2024-07-01 PROCEDURE — 81002 URINALYSIS NONAUTO W/O SCOPE: CPT | Mod: PBBFAC | Performed by: NURSE PRACTITIONER

## 2024-07-01 RX ADMIN — GEMCITABINE HYDROCHLORIDE 2000 MG: 1 INJECTION, POWDER, LYOPHILIZED, FOR SOLUTION INTRAVENOUS at 01:07

## 2024-07-01 NOTE — PROGRESS NOTES
CHIEF COMPLAINT:    Monroe Paul is a 78 y.o. male who presents today for Bladder Cancer.    HISTORY OF PRESENTING ILLINESS:    Monroe Paul is a 78 y.o. male who is an established patient in our clinic.   He is newly diagnosed with NMI Bladder Cancer.   He had original TURBT in [place] and then went on to have repeat resection at [facility].   He was seen in clinic 07/17/2023 with Dr. Luong     09/21/2023 he completed Induction GEMCITABINE x 6 doses.   11/02/2023 (-) cysto with Dr. Luong.      11/20/2023 he began Maintenance Gemcitabine; monthly x 12 doses. .      Here today for Maintenance Gemcitabine; dose 9 of 12.      Reports he was able to hold it the 2 hours last time.   Did not take his Na Bicarb last month, but did take today.   Last night only got up once to urinate.   No dysuria/hematuria.        Urologic History:      6/9/2023 -- TURBT @ [place] -- HG Ta  6/29/2023 -- CT Urogram -- no nodes, no renal masses, no ureteral filling defects  6/30/2023 -- TURBT @ [facility] -- no tumor  08/08/2023-09/21/2023 -- Completed Induction Gemcitabine.   11/02/2023 (-) cysto with Dr. Luong.   11/20/2023 he began Maintenance Gemcitabine x 12 months  04/25/2024 CTU  05/02/2024 normal cysto              REVIEW OF SYSTEMS:  Review of Systems   Constitutional: Negative.  Negative for chills and fever.   Eyes:  Negative for double vision.   Respiratory:  Negative for cough and shortness of breath.    Cardiovascular:  Negative for chest pain and palpitations.   Gastrointestinal:  Negative for abdominal pain, constipation, diarrhea, nausea and vomiting.   Genitourinary:  Negative for dysuria, flank pain, hematuria and urgency.        See HPI   Musculoskeletal:  Negative for falls.   Neurological:  Negative for dizziness and seizures.   Endo/Heme/Allergies:  Negative for polydipsia.         PATIENT HISTORY:    Past Medical History:   Diagnosis Date    Kidney stone        Past Surgical History:   Procedure Laterality  Date    BLADDER SURGERY      HERNIA REPAIR         No family history on file.    Social History     Socioeconomic History    Marital status:    Tobacco Use    Smoking status: Never    Smokeless tobacco: Never   Substance and Sexual Activity    Alcohol use: Yes    Drug use: Never    Sexual activity: Not Currently       Allergies:  Patient has no known allergies.    Medications:    Current Outpatient Medications:     celecoxib (CELEBREX) 200 MG capsule, Take 200 mg by mouth every morning., Disp: , Rfl:     cholecalciferol, vitamin D3, (VITAMIN D3) 25 mcg (1,000 unit) capsule, Take 5,000 Units by mouth., Disp: , Rfl:     cyanocobalamin 500 MCG tablet, Take 1 tablet by mouth every morning., Disp: , Rfl:     denosumab (PROLIA) 60 mg/mL Syrg, Inject 60 mg into the skin., Disp: , Rfl:     disulfiram (ANTABUSE) 250 mg tablet, Take 1 tablet (250 mg total) by mouth once daily., Disp: 30 tablet, Rfl: 01    disulfiram (ANTABUSE) 250 mg tablet, Take 1 tablet (250 mg total) by mouth once daily., Disp: 30 tablet, Rfl: 03    eszopiclone (LUNESTA) 3 mg Tab, Take 3 mg by mouth., Disp: , Rfl:     evolocumab (REPATHA SURECLICK) 140 mg/mL PnIj, Inject 140 mg into the skin., Disp: , Rfl:     ezetimibe (ZETIA) 10 mg tablet, Take 10 mg by mouth., Disp: , Rfl:     finasteride (PROSCAR) 5 mg tablet, Take 5 mg by mouth., Disp: , Rfl:     gabapentin (NEURONTIN) 300 MG capsule, Take by mouth., Disp: , Rfl:     hydroCHLOROthiazide (HYDRODIURIL) 50 MG tablet, Take 50 mg by mouth., Disp: , Rfl:     hyoscyamine (LEVSIN) 0.125 mg TbDL, Take by mouth., Disp: , Rfl:     hyoscyamine (LEVSIN) 0.125 mg TbDL, Take 0.125 mg by mouth., Disp: , Rfl:     imiquimod (ALDARA) 5 % cream, Apply 1 packet topically., Disp: , Rfl:     linaCLOtide (LINZESS) 290 mcg Cap capsule, Take 290 mcg by mouth., Disp: , Rfl:     LINZESS 290 mcg Cap capsule, Take 290 mcg by mouth., Disp: , Rfl:     LINZESS 72 mcg Cap capsule, Take 72 mcg by mouth every morning., Disp: ,  Rfl:     metoprolol succinate (TOPROL-XL) 25 MG 24 hr tablet, Take 25 mg by mouth., Disp: , Rfl:     metoprolol tartrate (LOPRESSOR) 50 MG tablet, Take as directed the night before and 1 hour prior to CT., Disp: , Rfl:     metroNIDAZOLE (METROGEL) 0.75 % gel, Apply 1 application  topically., Disp: , Rfl:     omega-3 fatty acids/fish oil (FISH OIL-OMEGA-3 FATTY ACIDS) 300-1,000 mg capsule, Take 2 capsules by mouth every evening., Disp: , Rfl:     potassium chloride (KLOR-CON) 10 MEQ TbSR, Take 10 mEq by mouth., Disp: , Rfl:     rosuvastatin (CRESTOR) 40 MG Tab, Take 40 mg by mouth every evening., Disp: , Rfl:     sertraline (ZOLOFT) 50 MG tablet, Take 1 tablet by mouth every morning., Disp: , Rfl:     sodium bicarbonate 650 MG tablet, Take 2 tablets (1,300 mg total) by mouth as needed (start before bladder installation to reduce irritation). 2 tabs night before bladder installation then 2 tabs the morning of the bladder installation, Disp: 24 tablet, Rfl: 1    sulfamethoxazole-trimethoprim 800-160mg (BACTRIM DS) 800-160 mg Tab, Take 1 tablet by mouth 2 (two) times daily., Disp: , Rfl:     tamsulosin (FLOMAX) 0.4 mg Cap, Take 0.4 mg by mouth., Disp: , Rfl:     thiamine 100 MG tablet, Take 100 mg by mouth every morning., Disp: , Rfl:     thiamine mononitrate, vit B1, (VITAMIN B-1, MONONITRATE,) 100 mg Tab, Take 1 tablet by mouth every morning., Disp: , Rfl:     TURMERIC ORAL, Take 1 capsule by mouth once daily., Disp: , Rfl:     valsartan (DIOVAN) 40 MG tablet, Take 40 mg by mouth., Disp: , Rfl:     ZINC ORAL, Take 1 tablet by mouth once daily., Disp: , Rfl:     Current Facility-Administered Medications:     gemcitabine (GEMZAR) 2,000 mg in 0.9% NaCl SolP 100 mL bladder instillation, 2,000 mg, Intravesical, 1 time in Clinic/HOD, Junior Luong MD    PHYSICAL EXAMINATION:  Physical Exam  Vitals and nursing note reviewed.   Constitutional:       General: He is awake.      Appearance: Normal appearance.   HENT:      Head:  "Normocephalic.      Right Ear: External ear normal.      Left Ear: External ear normal.      Nose: Nose normal.   Cardiovascular:      Rate and Rhythm: Normal rate.   Pulmonary:      Effort: Pulmonary effort is normal. No respiratory distress.   Abdominal:      Tenderness: There is no abdominal tenderness. There is no right CVA tenderness or left CVA tenderness.   Genitourinary:     Penis: Normal.       Testes: Normal.   Musculoskeletal:         General: Normal range of motion.      Cervical back: Normal range of motion.   Skin:     General: Skin is warm and dry.   Neurological:      General: No focal deficit present.      Mental Status: He is alert and oriented to person, place, and time.   Psychiatric:         Mood and Affect: Mood normal.         Behavior: Behavior is cooperative.           LABS:      In office UA today was clear of active infection and visible blood.       No results found for: "PSA", "PSADIAG", "PSATOTAL", "PHIND"    Lab Results   Component Value Date    CREATININE 0.9 06/07/2024    EGFRNORACEVR >60.0 06/07/2024               IMPRESSION:    Encounter Diagnoses   Name Primary?    Malignant neoplasm of overlapping sites of bladder Yes       Maintenance Gemcitabine; dose 9 of 12.      Assessment:       1. Malignant neoplasm of overlapping sites of bladder        Plan:          I spent 40 minutes with the patient of which more than half was spent in direct consultation with the patient in regards to our treatment and plan.  We addressed the expectations for today's plan of care.  Reviewed the preparation:   Na Bicarb 1300mg night before then this morning.   No coffee or caffeine this morning;    We discussed their Bladder Cancer.  Discussed previous treatments and the expectations, benefits, risks with this new treatment.  Reviewed how this medication works. Possible side effects.   Discussed importance of post clean up for 6 hours after the 2 hour urination;    Diet modifications; no caffeine the " morning of installation; increase water intake at the 2 hour void to flush out.  Sit to urinate; flush twice; no bleach needed  Clean urethra after initial urination.   No sex for 48 hours after installation; use of condom during the 6 week installations.  Narayan was placed and bladder drained.   I instilled the Gemcitabine 2000mg/100ml then removed the narayan.   I cleaned urethra and sounding tissue.  Again reviewed post installation instructions  Recommended lifestyle modifications with proper, healthy diet, good hydration if no fluid restrictions; reducing bladder irritants.    RTC in 4 weeks for Dose 10 of 12

## 2024-07-02 ENCOUNTER — CLINICAL SUPPORT (OUTPATIENT)
Dept: PSYCHIATRY | Facility: CLINIC | Age: 79
End: 2024-07-02
Payer: MEDICARE

## 2024-07-02 DIAGNOSIS — F10.21 ALCOHOL USE DISORDER, MODERATE, IN EARLY REMISSION: Primary | ICD-10-CM

## 2024-07-02 PROCEDURE — 90853 GROUP PSYCHOTHERAPY: CPT | Mod: ,,, | Performed by: SOCIAL WORKER

## 2024-07-05 NOTE — PROGRESS NOTES
Group Psychotherapy    Site: [facility]    Clinical status of patient: Outpatient    7/2/2024    Length of service:[id number]    Referred by: Addictive Behavior Unit     Number of patients in attendance: 5     Target symptoms: alcohol abuse    Patient's response to intervention:  The patient's response to intervention is active listening, self-disclosure.    Progress toward goals and other mental status changes:  The patient's progress toward goals is good.    Interval history: Patient returned to the clinic today for group therapy session.  Doing well overall.  Reports continued sobriety.  No relapses.  Denies cravings.  Attending AA meetings regularly.  Doing well at work.  Discussed upcoming family trip to [place].        Diagnosis: Alcohol Use Disorder     Plan: group psychotherapy    Return to clinic: as scheduled

## 2024-07-09 ENCOUNTER — CLINICAL SUPPORT (OUTPATIENT)
Dept: PSYCHIATRY | Facility: CLINIC | Age: 79
End: 2024-07-09
Payer: MEDICARE

## 2024-07-09 DIAGNOSIS — F10.21 ALCOHOL USE DISORDER, MODERATE, IN EARLY REMISSION: Primary | ICD-10-CM

## 2024-07-09 PROCEDURE — 90853 GROUP PSYCHOTHERAPY: CPT | Mod: ,,, | Performed by: SOCIAL WORKER

## 2024-07-14 NOTE — PROGRESS NOTES
Group Psychotherapy    Site: [facility]    Clinical status of patient: Outpatient    7/9/2024    Length of service:[id number]    Referred by: Addictive Behavior Unit     Number of patients in attendance: 5    Target symptoms: alcohol abuse    Patient's response to intervention:  The patient's response to intervention is active listening, self-disclosure.    Progress toward goals and other mental status changes:  The patient's progress toward goals is good.    Interval history: Patient returned to the clinic today for group therapy session.  Reports continued sobriety.  Attending AA meetings regularly.  Discussed upcoming family trip to [place].  Discussed relapse prevention coping skills related to the trip.       Diagnosis: Alcohol Use Disorder     Plan: group psychotherapy    Return to clinic: as scheduled

## 2024-07-24 ENCOUNTER — TELEPHONE (OUTPATIENT)
Dept: UROLOGY | Facility: CLINIC | Age: 79
End: 2024-07-24
Payer: COMMERCIAL

## 2024-07-24 NOTE — TELEPHONE ENCOUNTER
LMOVM confirming appt @1300 for 07/25/24 at [facility] 2nd floor. Please arrive 15 min early.

## 2024-07-26 ENCOUNTER — TELEPHONE (OUTPATIENT)
Dept: UROLOGY | Facility: CLINIC | Age: 79
End: 2024-07-26
Payer: COMMERCIAL

## 2024-07-26 NOTE — TELEPHONE ENCOUNTER
Attempted to call patient re:  missed cysto 7/26.  Msg left with return call information provided.

## 2024-07-29 ENCOUNTER — TELEPHONE (OUTPATIENT)
Dept: UROLOGY | Facility: CLINIC | Age: 79
End: 2024-07-29
Payer: COMMERCIAL

## 2024-07-30 ENCOUNTER — TELEPHONE (OUTPATIENT)
Dept: UROLOGY | Facility: CLINIC | Age: 79
End: 2024-07-30
Payer: COMMERCIAL

## 2024-07-30 NOTE — TELEPHONE ENCOUNTER
Spoke with patient who was able to provide acceptable patient identifiers prior to start of conversation. Patient rescheduled cysto appt and appt with GERARD Pantoja.

## 2024-08-06 ENCOUNTER — CLINICAL SUPPORT (OUTPATIENT)
Dept: PSYCHIATRY | Facility: CLINIC | Age: 79
End: 2024-08-06
Payer: MEDICARE

## 2024-08-06 DIAGNOSIS — F10.21 ALCOHOL USE DISORDER, MODERATE, IN EARLY REMISSION: Primary | ICD-10-CM

## 2024-08-06 PROCEDURE — 99999 PR PBB SHADOW E&M-EST. PATIENT-LVL I: CPT | Mod: PBBFAC,,, | Performed by: SOCIAL WORKER

## 2024-08-06 PROCEDURE — 90853 GROUP PSYCHOTHERAPY: CPT | Mod: ,,, | Performed by: SOCIAL WORKER

## 2024-08-06 PROCEDURE — 99211 OFF/OP EST MAY X REQ PHY/QHP: CPT | Mod: PBBFAC | Performed by: SOCIAL WORKER

## 2024-08-07 ENCOUNTER — TELEPHONE (OUTPATIENT)
Dept: UROLOGY | Facility: CLINIC | Age: 79
End: 2024-08-07
Payer: COMMERCIAL

## 2024-08-08 ENCOUNTER — OFFICE VISIT (OUTPATIENT)
Dept: UROLOGY | Facility: CLINIC | Age: 79
End: 2024-08-08
Payer: MEDICARE

## 2024-08-08 ENCOUNTER — PROCEDURE VISIT (OUTPATIENT)
Dept: UROLOGY | Facility: CLINIC | Age: 79
End: 2024-08-08
Payer: MEDICARE

## 2024-08-08 VITALS
BODY MASS INDEX: 24.07 KG/M2 | HEART RATE: 71 BPM | DIASTOLIC BLOOD PRESSURE: 75 MMHG | SYSTOLIC BLOOD PRESSURE: 128 MMHG | RESPIRATION RATE: 18 BRPM | HEIGHT: 72 IN | TEMPERATURE: 98 F | WEIGHT: 177.69 LBS

## 2024-08-08 VITALS
HEART RATE: 71 BPM | WEIGHT: 177.69 LBS | DIASTOLIC BLOOD PRESSURE: 75 MMHG | HEIGHT: 72 IN | BODY MASS INDEX: 24.07 KG/M2 | SYSTOLIC BLOOD PRESSURE: 128 MMHG

## 2024-08-08 DIAGNOSIS — Z08 ENCOUNTER FOR FOLLOW-UP SURVEILLANCE OF BLADDER CANCER: ICD-10-CM

## 2024-08-08 DIAGNOSIS — C67.8 MALIGNANT NEOPLASM OF OVERLAPPING SITES OF BLADDER: ICD-10-CM

## 2024-08-08 DIAGNOSIS — C67.8 MALIGNANT NEOPLASM OF OVERLAPPING SITES OF BLADDER: Primary | ICD-10-CM

## 2024-08-08 DIAGNOSIS — Z85.51 ENCOUNTER FOR FOLLOW-UP SURVEILLANCE OF BLADDER CANCER: ICD-10-CM

## 2024-08-08 LAB
BILIRUB SERPL-MCNC: NORMAL MG/DL
BLOOD URINE, POC: NORMAL
CLARITY, POC UA: CLEAR
COLOR, POC UA: YELLOW
GLUCOSE UR QL STRIP: NORMAL
KETONES UR QL STRIP: NORMAL
LEUKOCYTE ESTERASE URINE, POC: NORMAL
NITRITE, POC UA: NORMAL
PH, POC UA: 5.5
PROTEIN, POC: NORMAL
SPECIFIC GRAVITY, POC UA: 1.02
UROBILINOGEN, POC UA: 0.2

## 2024-08-08 PROCEDURE — 99215 OFFICE O/P EST HI 40 MIN: CPT | Mod: PBBFAC | Performed by: NURSE PRACTITIONER

## 2024-08-08 PROCEDURE — 52000 CYSTOURETHROSCOPY: CPT | Mod: PBBFAC | Performed by: STUDENT IN AN ORGANIZED HEALTH CARE EDUCATION/TRAINING PROGRAM

## 2024-08-08 PROCEDURE — 81002 URINALYSIS NONAUTO W/O SCOPE: CPT | Mod: PBBFAC | Performed by: NURSE PRACTITIONER

## 2024-08-08 PROCEDURE — 51720 TREATMENT OF BLADDER LESION: CPT | Mod: PBBFAC | Performed by: NURSE PRACTITIONER

## 2024-08-08 PROCEDURE — 99999 PR PBB SHADOW E&M-EST. PATIENT-LVL V: CPT | Mod: PBBFAC,,, | Performed by: NURSE PRACTITIONER

## 2024-08-08 PROCEDURE — 99999PBSHW POCT URINE DIPSTICK WITHOUT MICROSCOPE: Mod: PBBFAC,,,

## 2024-08-08 PROCEDURE — 99999PBSHW PR PBB SHADOW TECHNICAL ONLY FILED TO HB: Mod: PBBFAC,,,

## 2024-08-08 RX ORDER — LIDOCAINE HYDROCHLORIDE 20 MG/ML
JELLY TOPICAL ONCE
Status: COMPLETED | OUTPATIENT
Start: 2024-08-08 | End: 2024-08-08

## 2024-08-08 RX ADMIN — GEMCITABINE HYDROCHLORIDE 2000 MG: 1 INJECTION, POWDER, LYOPHILIZED, FOR SOLUTION INTRAVENOUS at 03:08

## 2024-08-08 RX ADMIN — LIDOCAINE HYDROCHLORIDE 5 ML: 20 JELLY TOPICAL at 01:08

## 2024-08-12 NOTE — PROGRESS NOTES
Group Psychotherapy    Site: [facility]    Clinical status of patient: Outpatient    8/6/2024    Length of service:[id number]    Referred by: Addictive Behavior Unit     Number of patients in attendance: 4     Target symptoms: alcohol abuse    Patient's response to intervention:  The patient's response to intervention is active listening, self-disclosure.    Progress toward goals and other mental status changes:  The patient's progress toward goals is good.    Interval history: Patient returned to the clinic today for group therapy session.  Doing well overall.  Reports continued sobriety.  Enjoyed his family trip to [facility].  Was able to maintain sobriety for the duration of the trip.  Discussed how his medication assisted treatment (Antabuse and Naltrexone) has been helpful to his recovery.         Diagnosis: Alcohol Use Disorder     Plan: group psychotherapy    Return to clinic: as scheduled

## 2024-08-20 ENCOUNTER — CLINICAL SUPPORT (OUTPATIENT)
Dept: PSYCHIATRY | Facility: CLINIC | Age: 79
End: 2024-08-20
Payer: MEDICARE

## 2024-08-20 DIAGNOSIS — F10.21 ALCOHOL USE DISORDER, MODERATE, IN EARLY REMISSION: Primary | ICD-10-CM

## 2024-08-20 PROCEDURE — 90853 GROUP PSYCHOTHERAPY: CPT | Mod: ,,, | Performed by: SOCIAL WORKER

## 2024-08-25 NOTE — PROGRESS NOTES
Group Psychotherapy    Site: [facility]    Clinical status of patient: Outpatient    8/20/2024    Length of service:[id number]    Referred by: Addictive Behavior Unit     Number of patients in attendance: 3     Target symptoms: alcohol abuse    Patient's response to intervention:  The patient's response to intervention is active listening, self-disclosure.    Progress toward goals and other mental status changes:  The patient's progress toward goals is good.    Interval history: Patient returned to the clinic today for group therapy session.  Doing well overall.  Reports continued sobriety.  He has been attending AA meetings regularly.  Going to a meeting later this evening in [place].  Feels like communication with his family has been good.      Diagnosis: Alcohol Use Disorder     Plan: group psychotherapy    Return to clinic: as scheduled

## 2024-08-29 ENCOUNTER — TELEPHONE (OUTPATIENT)
Dept: PSYCHIATRY | Facility: HOSPITAL | Age: 79
End: 2024-08-29
Payer: COMMERCIAL

## 2024-09-03 ENCOUNTER — CLINICAL SUPPORT (OUTPATIENT)
Dept: PSYCHIATRY | Facility: CLINIC | Age: 79
End: 2024-09-03
Payer: MEDICARE

## 2024-09-03 ENCOUNTER — OFFICE VISIT (OUTPATIENT)
Dept: UROLOGY | Facility: CLINIC | Age: 79
End: 2024-09-03
Payer: MEDICARE

## 2024-09-03 VITALS
WEIGHT: 182.19 LBS | HEIGHT: 72 IN | SYSTOLIC BLOOD PRESSURE: 113 MMHG | HEART RATE: 67 BPM | BODY MASS INDEX: 24.68 KG/M2 | DIASTOLIC BLOOD PRESSURE: 69 MMHG

## 2024-09-03 DIAGNOSIS — C67.8 MALIGNANT NEOPLASM OF OVERLAPPING SITES OF BLADDER: Primary | ICD-10-CM

## 2024-09-03 DIAGNOSIS — F10.21 ALCOHOL USE DISORDER, MODERATE, IN EARLY REMISSION: Primary | ICD-10-CM

## 2024-09-03 PROCEDURE — 99999 PR PBB SHADOW E&M-EST. PATIENT-LVL IV: CPT | Mod: PBBFAC,,, | Performed by: NURSE PRACTITIONER

## 2024-09-03 PROCEDURE — 99999PBSHW PR PBB SHADOW TECHNICAL ONLY FILED TO HB: Mod: PBBFAC,,,

## 2024-09-03 PROCEDURE — 51720 TREATMENT OF BLADDER LESION: CPT | Mod: PBBFAC | Performed by: NURSE PRACTITIONER

## 2024-09-03 PROCEDURE — 99499 UNLISTED E&M SERVICE: CPT | Mod: S$PBB,,, | Performed by: NURSE PRACTITIONER

## 2024-09-03 PROCEDURE — 81002 URINALYSIS NONAUTO W/O SCOPE: CPT | Mod: PBBFAC | Performed by: NURSE PRACTITIONER

## 2024-09-03 PROCEDURE — 99999PBSHW POCT URINE DIPSTICK WITHOUT MICROSCOPE: Mod: PBBFAC,,,

## 2024-09-03 PROCEDURE — 90853 GROUP PSYCHOTHERAPY: CPT | Mod: ,,, | Performed by: SOCIAL WORKER

## 2024-09-03 PROCEDURE — 99214 OFFICE O/P EST MOD 30 MIN: CPT | Mod: PBBFAC | Performed by: NURSE PRACTITIONER

## 2024-09-03 RX ORDER — ATOVAQUONE AND PROGUANIL HYDROCHLORIDE 250; 100 MG/1; MG/1
1 TABLET, FILM COATED ORAL EVERY MORNING
COMMUNITY
Start: 2024-06-10

## 2024-09-03 RX ADMIN — GEMCITABINE HYDROCHLORIDE 2000 MG: 1 INJECTION, POWDER, LYOPHILIZED, FOR SOLUTION INTRAVENOUS at 01:09

## 2024-09-03 NOTE — PROGRESS NOTES
CHIEF COMPLAINT:    Monroe Paul is a 79 y.o. male presents today for Bladder Cancer.     HISTORY OF PRESENTING ILLINESS:    Monroe Paul is a 79 y.o. male who is an established patient in our clinic.   He is newly diagnosed with NMI Bladder Cancer.   He had original TURBT in [place] and then went on to have repeat resection at [facility].   He was seen in clinic 07/17/2023 with Dr. Luong     09/21/2023 he completed Induction GEMCITABINE x 6 doses.   11/02/2023 (-) cysto with Dr. Luong.      11/20/2023 he began Maintenance Gemcitabine; monthly x 12 doses. .      Here today for Maintenance Gemcitabine; dose 12 of 12.      Reports he was able to hold it the 2 hours last time.   Did not take his Na Bicarb last month, but did take today.   Last night only got up once to urinate.   No dysuria/hematuria.        Urologic History:      6/9/2023 -- TURBT @ [place] -- HG Ta  6/29/2023 -- CT Urogram -- no nodes, no renal masses, no ureteral filling defects  6/30/2023 -- TURBT @ [facility] -- no tumor  08/08/2023-09/21/2023 -- Completed Induction Gemcitabine.   11/02/2023 (-) cysto with Dr. Luong.   11/20/2023 he began Maintenance Gemcitabine x 12 months  04/25/2024 CTU  05/02/2024 normal cysto  08/08/2024 normal cysto                REVIEW OF SYSTEMS:  Review of Systems   Constitutional: Negative.  Negative for chills and fever.   Eyes:  Negative for double vision.   Respiratory:  Negative for cough and shortness of breath.    Cardiovascular:  Negative for chest pain and palpitations.   Gastrointestinal:  Negative for abdominal pain, constipation, diarrhea, nausea and vomiting.   Genitourinary:  Negative for dysuria and hematuria.        See HPI   Musculoskeletal:  Negative for falls.   Neurological:  Negative for dizziness and seizures.   Endo/Heme/Allergies:  Negative for polydipsia.         PATIENT HISTORY:    Past Medical History:   Diagnosis Date    Kidney stone        Past Surgical History:   Procedure  Laterality Date    BLADDER SURGERY      HERNIA REPAIR         No family history on file.    Social History     Socioeconomic History    Marital status:    Tobacco Use    Smoking status: Never    Smokeless tobacco: Never   Substance and Sexual Activity    Alcohol use: Yes    Drug use: Never    Sexual activity: Not Currently       Allergies:  Patient has no known allergies.    Medications:    Current Outpatient Medications:     atovaquone-proguaniL (MALARONE) 250-100 mg Tab, Take 1 tablet by mouth every morning., Disp: , Rfl:     celecoxib (CELEBREX) 200 MG capsule, Take 200 mg by mouth every morning., Disp: , Rfl:     cholecalciferol, vitamin D3, (VITAMIN D3) 25 mcg (1,000 unit) capsule, Take 5,000 Units by mouth., Disp: , Rfl:     cyanocobalamin 500 MCG tablet, Take 1 tablet by mouth every morning., Disp: , Rfl:     denosumab (PROLIA) 60 mg/mL Syrg, Inject 60 mg into the skin., Disp: , Rfl:     disulfiram (ANTABUSE) 250 mg tablet, Take 1 tablet (250 mg total) by mouth once daily., Disp: 30 tablet, Rfl: 01    disulfiram (ANTABUSE) 250 mg tablet, Take 1 tablet (250 mg total) by mouth once daily., Disp: 30 tablet, Rfl: 03    eszopiclone (LUNESTA) 3 mg Tab, Take 3 mg by mouth., Disp: , Rfl:     evolocumab (REPATHA SURECLICK) 140 mg/mL PnIj, Inject 140 mg into the skin., Disp: , Rfl:     ezetimibe (ZETIA) 10 mg tablet, Take 10 mg by mouth., Disp: , Rfl:     finasteride (PROSCAR) 5 mg tablet, Take 5 mg by mouth., Disp: , Rfl:     gabapentin (NEURONTIN) 300 MG capsule, Take by mouth., Disp: , Rfl:     hydroCHLOROthiazide (HYDRODIURIL) 50 MG tablet, Take 50 mg by mouth., Disp: , Rfl:     hyoscyamine (LEVSIN) 0.125 mg TbDL, Take by mouth., Disp: , Rfl:     hyoscyamine (LEVSIN) 0.125 mg TbDL, Take 0.125 mg by mouth., Disp: , Rfl:     imiquimod (ALDARA) 5 % cream, Apply 1 packet topically., Disp: , Rfl:     linaCLOtide (LINZESS) 290 mcg Cap capsule, Take 290 mcg by mouth., Disp: , Rfl:     LINZESS 290 mcg Cap capsule,  Take 290 mcg by mouth., Disp: , Rfl:     LINZESS 72 mcg Cap capsule, Take 72 mcg by mouth every morning., Disp: , Rfl:     metoprolol succinate (TOPROL-XL) 25 MG 24 hr tablet, Take 25 mg by mouth., Disp: , Rfl:     metroNIDAZOLE (METROGEL) 0.75 % gel, Apply 1 application  topically., Disp: , Rfl:     omega-3 fatty acids/fish oil (FISH OIL-OMEGA-3 FATTY ACIDS) 300-1,000 mg capsule, Take 2 capsules by mouth every evening., Disp: , Rfl:     potassium chloride (KLOR-CON) 10 MEQ TbSR, Take 10 mEq by mouth., Disp: , Rfl:     rosuvastatin (CRESTOR) 40 MG Tab, Take 40 mg by mouth every evening., Disp: , Rfl:     sertraline (ZOLOFT) 50 MG tablet, Take 1 tablet by mouth every morning., Disp: , Rfl:     sodium bicarbonate 650 MG tablet, Take 2 tablets (1,300 mg total) by mouth as needed (start before bladder installation to reduce irritation). 2 tabs night before bladder installation then 2 tabs the morning of the bladder installation, Disp: 24 tablet, Rfl: 1    sulfamethoxazole-trimethoprim 800-160mg (BACTRIM DS) 800-160 mg Tab, Take 1 tablet by mouth 2 (two) times daily., Disp: , Rfl:     tamsulosin (FLOMAX) 0.4 mg Cap, Take 0.4 mg by mouth., Disp: , Rfl:     thiamine 100 MG tablet, Take 100 mg by mouth every morning., Disp: , Rfl:     thiamine mononitrate, vit B1, (VITAMIN B-1, MONONITRATE,) 100 mg Tab, Take 1 tablet by mouth every morning., Disp: , Rfl:     TURMERIC ORAL, Take 1 capsule by mouth once daily., Disp: , Rfl:     valsartan (DIOVAN) 40 MG tablet, Take 40 mg by mouth., Disp: , Rfl:     ZINC ORAL, Take 1 tablet by mouth once daily., Disp: , Rfl:     metoprolol tartrate (LOPRESSOR) 50 MG tablet, Take as directed the night before and 1 hour prior to CT., Disp: , Rfl:     Current Facility-Administered Medications:     gemcitabine (GEMZAR) 2,000 mg in 0.9% NaCl SolP 100 mL bladder instillation, 2,000 mg, Intravesical, 1 time in Clinic/HOD, Junior Luong MD    gemcitabine 2,000 mg in 0.9% NaCl SolP 100 mL bladder  instillation, 2,000 mg, Intravesical, 1 time in Clinic/HOD, Junior Luong MD    PHYSICAL EXAMINATION:  Physical Exam  Vitals and nursing note reviewed.   Constitutional:       General: He is awake.      Appearance: Normal appearance.   HENT:      Head: Normocephalic.      Right Ear: External ear normal.      Left Ear: External ear normal.      Nose: Nose normal.   Cardiovascular:      Rate and Rhythm: Normal rate.   Pulmonary:      Effort: Pulmonary effort is normal. No respiratory distress.   Abdominal:      Tenderness: There is no abdominal tenderness. There is no right CVA tenderness or left CVA tenderness.   Genitourinary:     Penis: Normal.       Testes: Normal.   Musculoskeletal:         General: Normal range of motion.      Cervical back: Normal range of motion.   Skin:     General: Skin is warm and dry.   Neurological:      General: No focal deficit present.      Mental Status: He is alert and oriented to person, place, and time.   Psychiatric:         Mood and Affect: Mood normal.         Behavior: Behavior is cooperative.           LABS:      In office UA today was clear of active infection and blood.         Lab Results   Component Value Date    CREATININE 0.9 06/07/2024    EGFRNORACEVR >60.0 06/07/2024               IMPRESSION:    Encounter Diagnoses   Name Primary?    Malignant neoplasm of overlapping sites of bladder Yes       Maintenance Gemcitabine; dose 12 of 12      Assessment:       1. Malignant neoplasm of overlapping sites of bladder        Plan:         I spent 40 minutes with the patient of which more than half was spent in direct consultation with the patient in regards to our treatment and plan.  We addressed the expectations for today's plan of care.  Reviewed the preparation:   Na Bicarb 1300mg night before then this morning.   No coffee or caffeine this morning;    We discussed their Bladder Cancer.  Discussed previous treatments and the expectations, benefits, risks with this new  treatment.  Reviewed how this medication works. Possible side effects.   Discussed importance of post clean up for 6 hours after the 2 hour urination;    Diet modifications; no caffeine the morning of installation; increase water intake at the 2 hour void to flush out.  Sit to urinate; flush twice; no bleach needed  Clean urethra after initial urination.   No sex for 48 hours after installation; BC if there is possibility of pregnancy.;  Narayan was placed and bladder drained.   I instilled the Gemcitabine 2000mg/100ml then removed the narayan.   I cleaned urethra and sounding tissue.  Again reviewed post installation instructions  Recommended lifestyle modifications with proper, healthy diet, good hydration if no fluid restrictions; reducing bladder irritants.    Cysto scheduled with Dr. Luong

## 2024-09-04 NOTE — PROGRESS NOTES
Group Psychotherapy    Site: [facility]    Clinical status of patient: Outpatient    9/3/2024    Length of service:[id number]    Referred by: Addictive Behavior Unit     Number of patients in attendance: 2     Target symptoms: alcohol abuse    Patient's response to intervention:  The patient's response to intervention is active listening, self-disclosure.    Progress toward goals and other mental status changes:  The patient's progress toward goals is good.    Interval history: Patient returned to the clinic today for group therapy session.  Doing well overall.  Reports continued sobriety.  Discussed how beneficial a certain passage from the [employer] has been to him.  Attending AA meetings regularly.  Communicating well with family.       Diagnosis: Alcohol Use Disorder     Plan: group psychotherapy    Return to clinic: as scheduled

## 2024-09-10 ENCOUNTER — LAB VISIT (OUTPATIENT)
Dept: LAB | Facility: HOSPITAL | Age: 79
End: 2024-09-10
Attending: NURSE PRACTITIONER
Payer: MEDICARE

## 2024-09-10 DIAGNOSIS — C67.8 MALIGNANT NEOPLASM OF OVERLAPPING SITES OF BLADDER: ICD-10-CM

## 2024-09-10 LAB
CREAT SERPL-MCNC: 0.9 MG/DL (ref 0.5–1.4)
EST. GFR  (NO RACE VARIABLE): >60 ML/MIN/1.73 M^2

## 2024-09-10 PROCEDURE — 36415 COLL VENOUS BLD VENIPUNCTURE: CPT | Performed by: NURSE PRACTITIONER

## 2024-09-10 PROCEDURE — 82565 ASSAY OF CREATININE: CPT | Performed by: NURSE PRACTITIONER

## 2024-09-23 ENCOUNTER — HOSPITAL ENCOUNTER (OUTPATIENT)
Dept: RADIOLOGY | Facility: HOSPITAL | Age: 79
Discharge: HOME | End: 2024-09-23
Attending: NURSE PRACTITIONER
Payer: MEDICARE

## 2024-09-23 DIAGNOSIS — C67.8 MALIGNANT NEOPLASM OF OVERLAPPING SITES OF BLADDER: ICD-10-CM

## 2024-09-23 PROCEDURE — 74178 CT ABD&PLV WO CNTR FLWD CNTR: CPT | Mod: 26,,, | Performed by: RADIOLOGY

## 2024-09-23 PROCEDURE — 25500020 PHARM REV CODE 255: Performed by: NURSE PRACTITIONER

## 2024-09-23 PROCEDURE — 74178 CT ABD&PLV WO CNTR FLWD CNTR: CPT | Mod: TC

## 2024-09-23 RX ADMIN — IOHEXOL 100 ML: 350 INJECTION, SOLUTION INTRAVENOUS at 12:09

## 2024-10-01 ENCOUNTER — CLINICAL SUPPORT (OUTPATIENT)
Dept: PSYCHIATRY | Facility: CLINIC | Age: 79
End: 2024-10-01
Payer: MEDICARE

## 2024-10-01 DIAGNOSIS — F10.21 ALCOHOL USE DISORDER, MODERATE, IN EARLY REMISSION: Primary | ICD-10-CM

## 2024-10-01 PROCEDURE — 90853 GROUP PSYCHOTHERAPY: CPT | Mod: ,,, | Performed by: SOCIAL WORKER

## 2024-10-05 NOTE — PROGRESS NOTES
Group Psychotherapy    Site: [facility]    Clinical status of patient: Outpatient    10/1/2024    Length of service:[id number]    Referred by: Addictive Behavior Unit     Number of patients in attendance: 3     Target symptoms: alcohol abuse    Patient's response to intervention:  The patient's response to intervention is active listening, self-disclosure.    Progress toward goals and other mental status changes:  The patient's progress toward goals is good.    Interval history: Patient returned to the clinic today for group therapy session.  Doing well overall.  Reports continued sobriety.  Continues to report regular AA group meeting attendance.  He has been travelling a bit for work lately.  Communicating well with his family.  Mood has been good, stable.              Diagnosis: Alcohol Use Disorder     Plan: group psychotherapy    Return to clinic: as scheduled           160.02

## 2024-10-08 ENCOUNTER — CLINICAL SUPPORT (OUTPATIENT)
Dept: PSYCHIATRY | Facility: CLINIC | Age: 79
End: 2024-10-08
Payer: MEDICARE

## 2024-10-08 DIAGNOSIS — F10.21 ALCOHOL USE DISORDER, MODERATE, IN EARLY REMISSION: Primary | ICD-10-CM

## 2024-10-08 PROCEDURE — 90853 GROUP PSYCHOTHERAPY: CPT | Mod: ,,, | Performed by: SOCIAL WORKER

## 2024-10-14 NOTE — PROGRESS NOTES
Group Psychotherapy    Site: [facility]    Clinical status of patient: Outpatient    10/8/2024    Length of service:[id number]    Referred by: Addictive Behavior Unit     Number of patients in attendance: 2     Target symptoms: alcohol abuse    Patient's response to intervention:  The patient's response to intervention is active listening, self-disclosure.    Progress toward goals and other mental status changes:  The patient's progress toward goals is good.    Interval history: Patient returned to the clinic today for group therapy session.  Doing well overall.  Reports continued sobriety.  Denies cravings.  Attending AA meetings regularly.  Discussed upcoming cruise he may be going on with his spouse, and how he will attend meetings on the boat ([employer]).             Diagnosis: Alcohol Use Disorder     Plan: group psychotherapy    Return to clinic: as scheduled

## 2024-10-21 ENCOUNTER — PATIENT MESSAGE (OUTPATIENT)
Dept: PSYCHIATRY | Facility: CLINIC | Age: 79
End: 2024-10-21
Payer: COMMERCIAL

## 2024-11-01 ENCOUNTER — TELEPHONE (OUTPATIENT)
Dept: UROLOGY | Facility: CLINIC | Age: 79
End: 2024-11-01
Payer: COMMERCIAL

## 2024-11-15 ENCOUNTER — PATIENT MESSAGE (OUTPATIENT)
Dept: PSYCHIATRY | Facility: CLINIC | Age: 79
End: 2024-11-15
Payer: COMMERCIAL

## 2024-11-20 ENCOUNTER — TELEPHONE (OUTPATIENT)
Dept: UROLOGY | Facility: CLINIC | Age: 79
End: 2024-11-20
Payer: COMMERCIAL

## 2024-11-21 ENCOUNTER — PROCEDURE VISIT (OUTPATIENT)
Dept: UROLOGY | Facility: CLINIC | Age: 79
End: 2024-11-21
Payer: MEDICARE

## 2024-11-21 VITALS
SYSTOLIC BLOOD PRESSURE: 126 MMHG | HEIGHT: 72 IN | WEIGHT: 181.56 LBS | HEART RATE: 75 BPM | DIASTOLIC BLOOD PRESSURE: 71 MMHG | TEMPERATURE: 98 F | BODY MASS INDEX: 24.59 KG/M2 | RESPIRATION RATE: 18 BRPM

## 2024-11-21 DIAGNOSIS — C67.8 MALIGNANT NEOPLASM OF OVERLAPPING SITES OF BLADDER: ICD-10-CM

## 2024-11-21 PROCEDURE — 52000 CYSTOURETHROSCOPY: CPT | Mod: PBBFAC | Performed by: STUDENT IN AN ORGANIZED HEALTH CARE EDUCATION/TRAINING PROGRAM

## 2024-11-21 PROCEDURE — 88112 CYTOPATH CELL ENHANCE TECH: CPT | Performed by: PATHOLOGY

## 2024-11-21 RX ORDER — LIDOCAINE HYDROCHLORIDE 20 MG/ML
JELLY TOPICAL ONCE
Status: COMPLETED | OUTPATIENT
Start: 2024-11-21 | End: 2024-11-21

## 2024-11-21 RX ADMIN — LIDOCAINE HYDROCHLORIDE 5 ML: 20 JELLY TOPICAL at 02:11

## 2024-11-21 NOTE — PROCEDURES
Office Cystoscopy Procedure Note    Date of Procedure: 11/21/2024    Indication:  Urothelial carcinoma of the bladder, high risk      Informed consent:  The risks, benefits, complications, treatment options, and expected outcomes were discussed with the patient. The patient concurred with the proposed plan and provided informed consent.     Anesthesia: Lidocaine jelly 2%     Antibiotic prophylaxis: None     Procedure:  The patient was placed in the lithotomy position, was prepped and draped in the usual manner using sterile technique, and 2% lidocaine jelly instilled into the urethra.  A 17 F flexible cystoscope was then inserted into the urethra and the urethra and bladder carefully examined.  The following findings were noted:       Findings:   Urethra: normal     Prostate: mild enlargement, no median lobe    Bladder: no lesions    Ureteral orifices:       -Right: Normal       -Left: Normal     Other findings:     None        Specimens: None                   Complications: None; patient tolerated the procedure well            Disposition: Home after brief observation     Condition: Stable     Plan: Cysto in 6 months.    Surveillance for high risk   Cystoscopy @ 3 months, then every 3-4 months for first 2 years, then 6 months for years 3 and 4, then annually thereafter  Upper tract imaging every 1-2 years      Attending Attestation:      I personally performed the procedure.       Junior Luong MD  Urologic Oncology  P: [phone]

## 2024-11-21 NOTE — PATIENT INSTRUCTIONS
What to Expect After a Cystoscopy  For the next 24-48 hours, you may feel a mild burning when you urinate. This burning is normal and expected. Drink plenty of water to dilute the urine to help relieve the burning sensation. You may also see a small amount of blood in your urine after the procedure.    Unless you are already taking antibiotics, you may be given an antibiotic after the test to prevent infection.    Signs and Symptoms to Report  Call the [facility] at [phone] if you develop any of the following:  Fever of 101 degrees or higher  Chills or persistent bleeding  Inability to urinate

## 2024-11-22 LAB
FINAL PATHOLOGIC DIAGNOSIS: NORMAL
Lab: NORMAL

## 2024-12-17 ENCOUNTER — CLINICAL SUPPORT (OUTPATIENT)
Dept: PSYCHIATRY | Facility: CLINIC | Age: 79
End: 2024-12-17
Payer: MEDICARE

## 2024-12-17 DIAGNOSIS — F10.21 ALCOHOL USE DISORDER, MODERATE, IN EARLY REMISSION: Primary | ICD-10-CM

## 2024-12-21 NOTE — PROGRESS NOTES
Group Psychotherapy    Site: [facility]    Clinical status of patient: Outpatient    12/17/2024    Length of service:[id number]    Referred by: Addictive Behavior Unit     Number of patients in attendance: 4     Target symptoms: alcohol abuse    Patient's response to intervention:  The patient's response to intervention is active listening, self-disclosure.    Progress toward goals and other mental status changes:  The patient's progress toward goals is good.    Interval history: Patient returned to the clinic today for group therapy session.  Doing well overall.  Reports continued sobriety.  Attending AA meetings regularly.  Discussed plans for the [place] holiday.  Continues to report good communication with his family.          Diagnosis: Alcohol Use Disorder     Plan: group psychotherapy    Return to clinic: as scheduled

## 2025-05-22 ENCOUNTER — PROCEDURE VISIT (OUTPATIENT)
Dept: UROLOGY | Facility: CLINIC | Age: 80
End: 2025-05-22
Payer: MEDICARE

## 2025-05-22 VITALS
TEMPERATURE: 98 F | HEART RATE: 69 BPM | BODY MASS INDEX: 25.22 KG/M2 | SYSTOLIC BLOOD PRESSURE: 123 MMHG | DIASTOLIC BLOOD PRESSURE: 74 MMHG | HEIGHT: 72 IN | WEIGHT: 186.19 LBS | RESPIRATION RATE: 18 BRPM

## 2025-05-22 DIAGNOSIS — C67.8 MALIGNANT NEOPLASM OF OVERLAPPING SITES OF BLADDER: ICD-10-CM

## 2025-05-22 PROCEDURE — 52000 CYSTOURETHROSCOPY: CPT | Mod: PBBFAC | Performed by: STUDENT IN AN ORGANIZED HEALTH CARE EDUCATION/TRAINING PROGRAM

## 2025-05-22 PROCEDURE — 52000 CYSTOURETHROSCOPY: CPT | Mod: S$PBB,,, | Performed by: STUDENT IN AN ORGANIZED HEALTH CARE EDUCATION/TRAINING PROGRAM

## 2025-05-22 RX ORDER — LIDOCAINE HYDROCHLORIDE 20 MG/ML
JELLY TOPICAL ONCE
Status: COMPLETED | OUTPATIENT
Start: 2025-05-22 | End: 2025-05-22

## 2025-05-22 RX ADMIN — LIDOCAINE HYDROCHLORIDE 5 ML: 20 JELLY TOPICAL at 12:05

## 2025-05-22 NOTE — PATIENT INSTRUCTIONS
What to Expect After a Cystoscopy  For the next 24-48 hours, you may feel a mild burning when you urinate. This burning is normal and expected. Drink plenty of water to dilute the urine to help relieve the burning sensation. You may also see a small amount of blood in your urine after the procedure.    Unless you are already taking antibiotics, you may be given an antibiotic after the test to prevent infection.    Signs and Symptoms to Report  Call the [facility] at [phone] if you develop any of the following:  Fever of 101 degrees or higher  Chills or persistent bleeding  Inability to urinate

## 2025-05-22 NOTE — PROCEDURES
Office Cystoscopy Procedure Note    Date of Procedure: 05/22/2025    Indication:  Urothelial carcinoma of the bladder, high risk      Informed consent:  The risks, benefits, complications, treatment options, and expected outcomes were discussed with the patient. The patient concurred with the proposed plan and provided informed consent.     Anesthesia: Lidocaine jelly 2%     Antibiotic prophylaxis: None     Procedure:  The patient was placed in the lithotomy position, was prepped and draped in the usual manner using sterile technique, and 2% lidocaine jelly instilled into the urethra.  A 17 F flexible cystoscope was then inserted into the urethra and the urethra and bladder carefully examined.  The following findings were noted:       Findings:   Urethra: normal     Prostate: mild enlargement, no median lobe    Bladder: no lesions    Ureteral orifices:       -Right: Normal       -Left: Normal     Other findings:     None        Specimens: None                   Complications: None; patient tolerated the procedure well            Disposition: Home after brief observation     Condition: Stable     Plan: Cysto in 6 months. No cytology today.    Surveillance for high risk   Cystoscopy @ 3 months, then every 3-4 months for first 2 years, then 6 months for years 3 and 4, then annually thereafter  Upper tract imaging every 1-2 years      Attending Attestation:      I personally performed the procedure.       Junior Luong MD  Urologic Oncology  P: [phone]